# Patient Record
Sex: MALE | Race: WHITE | NOT HISPANIC OR LATINO | Employment: OTHER | URBAN - METROPOLITAN AREA
[De-identification: names, ages, dates, MRNs, and addresses within clinical notes are randomized per-mention and may not be internally consistent; named-entity substitution may affect disease eponyms.]

---

## 2019-05-26 ENCOUNTER — HOSPITAL ENCOUNTER (EMERGENCY)
Facility: HOSPITAL | Age: 34
Discharge: HOME | End: 2019-05-26
Attending: EMERGENCY MEDICINE
Payer: OTHER GOVERNMENT

## 2019-05-26 VITALS
WEIGHT: 145 LBS | SYSTOLIC BLOOD PRESSURE: 131 MMHG | HEIGHT: 69 IN | RESPIRATION RATE: 18 BRPM | TEMPERATURE: 98 F | HEART RATE: 66 BPM | BODY MASS INDEX: 21.48 KG/M2 | DIASTOLIC BLOOD PRESSURE: 82 MMHG | OXYGEN SATURATION: 100 %

## 2019-05-26 DIAGNOSIS — R60.9 DEPENDENT EDEMA: Primary | ICD-10-CM

## 2019-05-26 DIAGNOSIS — R79.89 ELEVATED LIVER FUNCTION TESTS: ICD-10-CM

## 2019-05-26 LAB
ALBUMIN SERPL BCP-MCNC: 3.1 G/DL (ref 3.5–5.2)
ALP SERPL-CCNC: 113 U/L (ref 55–135)
ALT SERPL W/O P-5'-P-CCNC: 132 U/L (ref 10–44)
ANION GAP SERPL CALC-SCNC: 9 MMOL/L (ref 8–16)
APAP SERPL-MCNC: 18 UG/ML (ref 10–20)
AST SERPL-CCNC: 132 U/L (ref 10–40)
BASOPHILS # BLD AUTO: 0.06 K/UL (ref 0–0.2)
BASOPHILS NFR BLD: 1.1 % (ref 0–1.9)
BILIRUB SERPL-MCNC: 0.4 MG/DL (ref 0.1–1)
BILIRUB UR QL STRIP: NEGATIVE
BNP SERPL-MCNC: 60 PG/ML (ref 0–99)
BUN SERPL-MCNC: 5 MG/DL (ref 6–20)
CALCIUM SERPL-MCNC: 8.8 MG/DL (ref 8.7–10.5)
CHLORIDE SERPL-SCNC: 107 MMOL/L (ref 95–110)
CLARITY UR: CLEAR
CO2 SERPL-SCNC: 27 MMOL/L (ref 23–29)
COLOR UR: YELLOW
CREAT SERPL-MCNC: 0.8 MG/DL (ref 0.5–1.4)
DIFFERENTIAL METHOD: ABNORMAL
EOSINOPHIL # BLD AUTO: 0.3 K/UL (ref 0–0.5)
EOSINOPHIL NFR BLD: 5.8 % (ref 0–8)
ERYTHROCYTE [DISTWIDTH] IN BLOOD BY AUTOMATED COUNT: 13.3 % (ref 11.5–14.5)
EST. GFR  (AFRICAN AMERICAN): >60 ML/MIN/1.73 M^2
EST. GFR  (NON AFRICAN AMERICAN): >60 ML/MIN/1.73 M^2
GLUCOSE SERPL-MCNC: 83 MG/DL (ref 70–110)
GLUCOSE UR QL STRIP: NEGATIVE
HCT VFR BLD AUTO: 41.1 % (ref 40–54)
HGB BLD-MCNC: 15 G/DL (ref 14–18)
HGB UR QL STRIP: NEGATIVE
KETONES UR QL STRIP: NEGATIVE
LEUKOCYTE ESTERASE UR QL STRIP: NEGATIVE
LIPASE SERPL-CCNC: 22 U/L (ref 4–60)
LYMPHOCYTES # BLD AUTO: 2 K/UL (ref 1–4.8)
LYMPHOCYTES NFR BLD: 36.3 % (ref 18–48)
MCH RBC QN AUTO: 38.8 PG (ref 27–31)
MCHC RBC AUTO-ENTMCNC: 36.5 G/DL (ref 32–36)
MCV RBC AUTO: 106 FL (ref 82–98)
MONOCYTES # BLD AUTO: 0.6 K/UL (ref 0.3–1)
MONOCYTES NFR BLD: 10.6 % (ref 4–15)
NEUTROPHILS # BLD AUTO: 2.4 K/UL (ref 1.8–7.7)
NEUTROPHILS NFR BLD: 45.1 % (ref 38–73)
NITRITE UR QL STRIP: NEGATIVE
PH UR STRIP: 6 [PH] (ref 5–8)
PLATELET # BLD AUTO: 408 K/UL (ref 150–350)
PMV BLD AUTO: 8.4 FL (ref 9.2–12.9)
POTASSIUM SERPL-SCNC: 3.8 MMOL/L (ref 3.5–5.1)
PROT SERPL-MCNC: 6.5 G/DL (ref 6–8.4)
PROT UR QL STRIP: NEGATIVE
RBC # BLD AUTO: 3.87 M/UL (ref 4.6–6.2)
SODIUM SERPL-SCNC: 143 MMOL/L (ref 136–145)
SP GR UR STRIP: 1.01 (ref 1–1.03)
URN SPEC COLLECT METH UR: NORMAL
UROBILINOGEN UR STRIP-ACNC: NEGATIVE EU/DL
WBC # BLD AUTO: 5.37 K/UL (ref 3.9–12.7)

## 2019-05-26 PROCEDURE — 83880 ASSAY OF NATRIURETIC PEPTIDE: CPT

## 2019-05-26 PROCEDURE — 83690 ASSAY OF LIPASE: CPT

## 2019-05-26 PROCEDURE — 81003 URINALYSIS AUTO W/O SCOPE: CPT

## 2019-05-26 PROCEDURE — 80329 ANALGESICS NON-OPIOID 1 OR 2: CPT

## 2019-05-26 PROCEDURE — 99283 EMERGENCY DEPT VISIT LOW MDM: CPT

## 2019-05-26 PROCEDURE — 85025 COMPLETE CBC W/AUTO DIFF WBC: CPT

## 2019-05-26 PROCEDURE — 80053 COMPREHEN METABOLIC PANEL: CPT

## 2019-05-26 PROCEDURE — 25000003 PHARM REV CODE 250: Performed by: NURSE PRACTITIONER

## 2019-05-26 RX ORDER — FUROSEMIDE 40 MG/1
40 TABLET ORAL DAILY
Qty: 2 TABLET | Refills: 0 | Status: ON HOLD | OUTPATIENT
Start: 2019-05-26 | End: 2023-08-23 | Stop reason: HOSPADM

## 2019-05-26 RX ORDER — FUROSEMIDE 40 MG/1
40 TABLET ORAL
Status: COMPLETED | OUTPATIENT
Start: 2019-05-26 | End: 2019-05-26

## 2019-05-26 RX ADMIN — FUROSEMIDE 40 MG: 40 TABLET ORAL at 09:05

## 2019-05-26 NOTE — ED PROVIDER NOTES
"Encounter Date: 5/26/2019    SCRIBE #1 NOTE: I, Wagneron Reva, am scribing for, and in the presence of,  DAVID Valenzuela. I have scribed the following portions of the note - Other sections scribed: HPI, ROS.       History     Chief Complaint   Patient presents with    Leg Swelling     Pt reports flying back from [place] last Saturday and has swelling to both legs. Pt went to  and no blood clots were found, pt denies receiving medication to reduce swelling. Pt denies shortness of breath.      This is a 33 y.o. male who presents withbilateral lower extremity swelling beginning one week ago. The patient states that flew in one week ago from [place] and began to notice swelling.The patient was seen at [facility] 2 days ago and diagnosed with liver dysfunction and edema. He received an US, which found no blood clots.  He has elevated both feet, but the swelling has persisted, and reportedly worsened over the past two days.  He has taken Tylenol, usually twice daily. He reports Naproxen and Ibuprofen allergy. The patient has not had any alcohol to drink today, but reports drinking 2 beers yesterday. He denies calf pain, dysuria, cough, fever, chills. nausea, vomiting, chest pain, rhinorrhea, and shortness of breath. He reports his last dose of Tylenol was this morning prior to arrival.    The history is provided by the patient. No  was used.     Review of patient's allergies indicates:   Allergen Reactions    Nsaids (non-steroidal anti-inflammatory drug) Swelling     " my face swells"     History reviewed. No pertinent past medical history.  History reviewed. No pertinent surgical history.  History reviewed. No pertinent family history.  Social History     Tobacco Use    Smoking status: Current Every Day Smoker    Smokeless tobacco: Never Used   Substance Use Topics    Alcohol use: Yes     Comment: occasional    Drug use: Never     Review of Systems   Constitutional: Negative for chills " and fever.   HENT: Negative for rhinorrhea, sore throat and trouble swallowing.    Respiratory: Negative for cough and shortness of breath.    Cardiovascular: Negative for chest pain.   Gastrointestinal: Negative for nausea and vomiting.   Genitourinary: Negative for dysuria.   Musculoskeletal: Negative for back pain and neck pain.        (-) calf pain  (+) bilateral lower extremity swelling (Ankle to foot)  (-) calf swelling   Skin: Negative for pallor, rash and wound.   Neurological: Negative for syncope and weakness.   Psychiatric/Behavioral: Negative for confusion.       Physical Exam     Initial Vitals [05/26/19 0738]   BP Pulse Resp Temp SpO2   123/83 82 16 97.8 °F (36.6 °C) 99 %      MAP       --         Physical Exam    Nursing note and vitals reviewed.  Constitutional: He appears well-developed and well-nourished. He is not diaphoretic. He is cooperative.  Non-toxic appearance. He does not have a sickly appearance. He does not appear ill. No distress.   HENT:   Head: Normocephalic and atraumatic.   Right Ear: Tympanic membrane and external ear normal. Tympanic membrane is not erythematous.   Left Ear: Tympanic membrane and external ear normal. Tympanic membrane is not erythematous.   Mouth/Throat: Uvula is midline, oropharynx is clear and moist and mucous membranes are normal. No trismus in the jaw.   Eyes: Conjunctivae and EOM are normal.   Neck: Normal range of motion.   Cardiovascular: Normal rate, regular rhythm and intact distal pulses.   No murmur heard.  Pulses:       Radial pulses are 2+ on the right side, and 2+ on the left side.        Dorsalis pedis pulses are 2+ on the right side, and 2+ on the left side.   1-2+ pitting edema to bilateral lower extremities from the lower ankle distally.    Pulmonary/Chest: Breath sounds normal. No tachypnea and no bradypnea. No respiratory distress. He has no wheezes. He has no rhonchi. He has no rales.   Abdominal: Soft. He exhibits no distension. There is no  tenderness. There is no rebound and no guarding.   Musculoskeletal: Normal range of motion.        Right knee: Normal.        Left knee: Normal.        Right ankle: He exhibits swelling. He exhibits normal range of motion. No tenderness. No lateral malleolus and no medial malleolus tenderness found.        Left ankle: He exhibits swelling. He exhibits normal range of motion. No tenderness. No lateral malleolus and no medial malleolus tenderness found.        Right foot: There is swelling. There is no tenderness, no bony tenderness, normal capillary refill, no crepitus and no deformity.        Left foot: There is swelling. There is no tenderness, no bony tenderness, normal capillary refill, no crepitus and no deformity.        Feet:    No tenderness over the bilateral foot or ankle.  There is 1 to 2+ pitting edema over the distal ankle to the toe.  There is no popliteal tenderness or fullness.  No posterior calf tenderness or fullness.  There is no tib-fib swelling. There is no pretibial edema.   Neurological: He is alert and oriented to person, place, and time. He has normal strength. No sensory deficit. Coordination and gait normal. GCS score is 15. GCS eye subscore is 4. GCS verbal subscore is 5. GCS motor subscore is 6.   Skin: Skin is warm and dry. Capillary refill takes less than 2 seconds. No bruising and no rash noted. No erythema.   Psychiatric: He has a normal mood and affect. His behavior is normal. Judgment and thought content normal.         ED Course   Procedures  Labs Reviewed   COMPREHENSIVE METABOLIC PANEL - Abnormal; Notable for the following components:       Result Value    BUN, Bld 5 (*)     Albumin 3.1 (*)      (*)      (*)     All other components within normal limits   CBC W/ AUTO DIFFERENTIAL - Abnormal; Notable for the following components:    RBC 3.87 (*)     Mean Corpuscular Volume 106 (*)     Mean Corpuscular Hemoglobin 38.8 (*)     Mean Corpuscular Hemoglobin Conc 36.5 (*)      Platelets 408 (*)     MPV 8.4 (*)     All other components within normal limits   LIPASE   ACETAMINOPHEN LEVEL   URINALYSIS, REFLEX TO URINE CULTURE    Narrative:     Preferred Collection Type->Urine, Clean Catch   B-TYPE NATRIURETIC PEPTIDE          Imaging Results    None          Medical Decision Making:   History:   Old Medical Records: I decided to obtain old medical records.  Old Records Summarized: records from another hospital.       <> Summary of Records: Records reviewed:  Patient evaluated at [facility] on 05/24/2019.  Diagnosed with liver dysfunction, swelling and dependent edema.  Normal chest x-ray.  Labs with an elevated AST:  212, ALT:  165, alk phos 125.  Normal T bili.  Normal renal function. Bilateral lower extremity ultrasounds without evidence of DVT.       APC / Resident Notes:   This is an evaluation of a 33 y.o. male that presents to the Emergency Department for bilateral foot and ankle swelling. Reports no recent injuries.  Does report flight from [place].  Evaluated at an outside facility with negative ultrasounds for DVT 2 days ago.  Also noted to have elevated liver function. Occasional Tylenol use, occasional alcohol. Physical Exam shows a non-toxic, afebrile, and well appearing male.  There is 1 to 2+ pitting edema over the distal ankle extending to the foot bilaterally. Distal pulses intact. Capillary refill less than 2 sec.  No bony tenderness over the bilateral feet or ankles.  Posterior calf compartments are soft without pain. No popliteal tenderness or fullness.  No distal tib-fib were pretibial swelling or edema. Breath sounds are clear and equal. Heart regular rhythm with no murmurs appreciated. Vital signs are reassuring. If available, previous records reviewed. RESULTS:  CBC with no leukocytosis.  Normal H&H.  Elevation in the MCV: 106, MCH:  30.8, platelet count 408.  Normal granulocytes.  CMP with a BUN of 5, AST elevated at 132, ALT elevated at 132.  Normal T bili.  Normal  alk-phos.  Acetaminophen level within normal limits at 18 however above typical lower limits. Urinalysis without evidence of infection or proteinuria.    My overall impression is elevated liver enzymes (?tylenol vs alcohol vs other) with dependent edema. I considered, but at this time, do not suspect DVT, cellulitis, arterial occlusion, compartment syndrome, heart failure, shock liver.  He has been advised that he is to follow up with primary care for additional testing including but not limited to hepatitis.    ED Course:  Lasix. D/C Meds:  Lasix x2 additional days. D/C Information:  Elevation of legs, avoidance of Tylenol and alcohol. The diagnosis, treatment plan, instructions for follow-up and reevaluation with PCP upon return to [place] in 2 days  as well as ED return precautions were discussed and understanding was verbalized. All questions or concerns have been addressed. This case was discussed with Dr. Rebolledo who is in agreement with my assessment and plan. MACK Watson FNP-C          Scribe attestation: I, MACK Watson FNP-C, personally performed the services described in this documentation. All medical record entries made by the scribe were at my direction and in my presence.  I have reviewed the chart and agree that the record reflects my personal performance and is accurate and complete.             Clinical Impression:     1. Dependent edema    2. Elevated liver function tests            Disposition:   Disposition: Discharged  Condition: Stable                        DAVID Busch  05/26/19 0917

## 2019-05-26 NOTE — ED TRIAGE NOTES
Pt arrived to ER with complaints of bilateral lower extremity edema. Pt rates the pain as 6/10 at this time. Pt flew in from [place] last Saturday and was seen at [facility] on Friday. No blood clots found by [facility]. Pt denies shortness of breath, pt denies receiving any medication to reduce the swelling. Pt was advised to elevate his legs, pt denies relief from swelling with elevating.

## 2023-08-14 ENCOUNTER — HOSPITAL ENCOUNTER (INPATIENT)
Facility: HOSPITAL | Age: 38
LOS: 14 days | Discharge: NURSING, REHAB OR LONG-TERM CARE | DRG: 896 | End: 2023-08-28
Attending: EMERGENCY MEDICINE | Admitting: INTERNAL MEDICINE
Payer: OTHER GOVERNMENT

## 2023-08-14 DIAGNOSIS — E87.8 ELECTROLYTE ABNORMALITY: ICD-10-CM

## 2023-08-14 DIAGNOSIS — M79.89 LEG SWELLING: ICD-10-CM

## 2023-08-14 DIAGNOSIS — F10.931 ALCOHOL WITHDRAWAL SYNDROME, WITH DELIRIUM: Primary | ICD-10-CM

## 2023-08-14 DIAGNOSIS — E80.6 HYPERBILIRUBINEMIA: ICD-10-CM

## 2023-08-14 DIAGNOSIS — R41.82 ALTERED MENTAL STATUS: ICD-10-CM

## 2023-08-14 DIAGNOSIS — E87.6 HYPOKALEMIA: ICD-10-CM

## 2023-08-14 DIAGNOSIS — R60.0 LOWER EXTREMITY EDEMA: ICD-10-CM

## 2023-08-14 DIAGNOSIS — M79.89 BILATERAL SWELLING OF FEET: ICD-10-CM

## 2023-08-14 DIAGNOSIS — E83.52 HYPERCALCEMIA: ICD-10-CM

## 2023-08-14 DIAGNOSIS — R00.0 SINUS TACHYCARDIA: ICD-10-CM

## 2023-08-14 PROBLEM — G93.41 ENCEPHALOPATHY, METABOLIC: Status: ACTIVE | Noted: 2023-08-14

## 2023-08-14 PROBLEM — F10.10 ALCOHOL ABUSE: Status: ACTIVE | Noted: 2023-08-14

## 2023-08-14 PROBLEM — R74.01 TRANSAMINITIS: Status: ACTIVE | Noted: 2023-08-14

## 2023-08-14 PROBLEM — E87.20 LACTIC ACID ACIDOSIS: Status: ACTIVE | Noted: 2023-08-14

## 2023-08-14 LAB
ALBUMIN SERPL BCP-MCNC: 3.2 G/DL (ref 3.5–5.2)
ALP SERPL-CCNC: 124 U/L (ref 55–135)
ALT SERPL W/O P-5'-P-CCNC: 54 U/L (ref 10–44)
AMMONIA PLAS-SCNC: 36 UMOL/L (ref 10–50)
AMPHET+METHAMPHET UR QL: NEGATIVE
ANION GAP SERPL CALC-SCNC: 24 MMOL/L (ref 8–16)
AST SERPL-CCNC: 63 U/L (ref 10–40)
BACTERIA #/AREA URNS HPF: ABNORMAL /HPF
BARBITURATES UR QL SCN>200 NG/ML: NEGATIVE
BASOPHILS # BLD AUTO: 0.01 K/UL (ref 0–0.2)
BASOPHILS NFR BLD: 0.1 % (ref 0–1.9)
BENZODIAZ UR QL SCN>200 NG/ML: NEGATIVE
BILIRUB SERPL-MCNC: 3 MG/DL (ref 0.1–1)
BILIRUB UR QL STRIP: NEGATIVE
BNP SERPL-MCNC: 70 PG/ML (ref 0–99)
BUN SERPL-MCNC: 14 MG/DL (ref 6–20)
BZE UR QL SCN: NEGATIVE
CALCIUM SERPL-MCNC: 11.6 MG/DL (ref 8.7–10.5)
CANNABINOIDS UR QL SCN: NEGATIVE
CAOX CRY URNS QL MICRO: ABNORMAL
CHLORIDE SERPL-SCNC: 91 MMOL/L (ref 95–110)
CLARITY UR: ABNORMAL
CO2 SERPL-SCNC: 17 MMOL/L (ref 23–29)
COLOR UR: ABNORMAL
CREAT SERPL-MCNC: 0.7 MG/DL (ref 0.5–1.4)
CREAT UR-MCNC: 173.9 MG/DL (ref 23–375)
CREAT UR-MCNC: 174.9 MG/DL (ref 23–375)
DIFFERENTIAL METHOD: ABNORMAL
EOSINOPHIL # BLD AUTO: 0 K/UL (ref 0–0.5)
EOSINOPHIL NFR BLD: 0 % (ref 0–8)
ERYTHROCYTE [DISTWIDTH] IN BLOOD BY AUTOMATED COUNT: 16 % (ref 11.5–14.5)
EST. GFR  (NO RACE VARIABLE): >60 ML/MIN/1.73 M^2
ETHANOL SERPL-MCNC: <10 MG/DL
FOLATE SERPL-MCNC: <2.2 NG/ML (ref 4–24)
GLUCOSE SERPL-MCNC: 95 MG/DL (ref 70–110)
GLUCOSE UR QL STRIP: NEGATIVE
HCT VFR BLD AUTO: 27.5 % (ref 40–54)
HGB BLD-MCNC: 10.1 G/DL (ref 14–18)
HGB UR QL STRIP: NEGATIVE
HYALINE CASTS #/AREA URNS LPF: 9 /LPF
IMM GRANULOCYTES # BLD AUTO: 0.08 K/UL (ref 0–0.04)
IMM GRANULOCYTES NFR BLD AUTO: 0.7 % (ref 0–0.5)
INR PPP: 1.1 (ref 0.8–1.2)
KETONES UR QL STRIP: ABNORMAL
LACTATE SERPL-SCNC: 10.3 MMOL/L (ref 0.5–2.2)
LACTATE SERPL-SCNC: 4 MMOL/L (ref 0.5–2.2)
LEUKOCYTE ESTERASE UR QL STRIP: NEGATIVE
LYMPHOCYTES # BLD AUTO: 0.7 K/UL (ref 1–4.8)
LYMPHOCYTES NFR BLD: 5.6 % (ref 18–48)
MAGNESIUM SERPL-MCNC: 1.7 MG/DL (ref 1.6–2.6)
MCH RBC QN AUTO: 44.1 PG (ref 27–31)
MCHC RBC AUTO-ENTMCNC: 36.7 G/DL (ref 32–36)
MCV RBC AUTO: 120 FL (ref 82–98)
METHADONE UR QL SCN>300 NG/ML: NEGATIVE
MICROSCOPIC COMMENT: ABNORMAL
MONOCYTES # BLD AUTO: 0.6 K/UL (ref 0.3–1)
MONOCYTES NFR BLD: 4.9 % (ref 4–15)
NEUTROPHILS # BLD AUTO: 10.7 K/UL (ref 1.8–7.7)
NEUTROPHILS NFR BLD: 88.7 % (ref 38–73)
NITRITE UR QL STRIP: NEGATIVE
NRBC BLD-RTO: 0 /100 WBC
OPIATES UR QL SCN: NEGATIVE
PCP UR QL SCN>25 NG/ML: NEGATIVE
PH UR STRIP: 6 [PH] (ref 5–8)
PHOSPHATE SERPL-MCNC: 4.8 MG/DL (ref 2.7–4.5)
PLATELET # BLD AUTO: 157 K/UL (ref 150–450)
PMV BLD AUTO: 9.6 FL (ref 9.2–12.9)
POTASSIUM SERPL-SCNC: 2.9 MMOL/L (ref 3.5–5.1)
PROCALCITONIN SERPL IA-MCNC: 0.13 NG/ML
PROT SERPL-MCNC: 6.6 G/DL (ref 6–8.4)
PROT UR QL STRIP: ABNORMAL
PROT UR-MCNC: 28 MG/DL
PROT/CREAT UR: 0.16 MG/G{CREAT} (ref 0–0.2)
PROTHROMBIN TIME: 11.9 SEC (ref 9–12.5)
RBC # BLD AUTO: 2.29 M/UL (ref 4.6–6.2)
RBC #/AREA URNS HPF: 0 /HPF (ref 0–4)
SODIUM SERPL-SCNC: 132 MMOL/L (ref 136–145)
SP GR UR STRIP: 1.03 (ref 1–1.03)
TOXICOLOGY INFORMATION: NORMAL
TROPONIN I SERPL DL<=0.01 NG/ML-MCNC: <0.006 NG/ML (ref 0–0.03)
TSH SERPL DL<=0.005 MIU/L-ACNC: 1.29 UIU/ML (ref 0.4–4)
UNIDENT CRYS URNS QL MICRO: ABNORMAL
URN SPEC COLLECT METH UR: ABNORMAL
UROBILINOGEN UR STRIP-ACNC: ABNORMAL EU/DL
VIT B12 SERPL-MCNC: 423 PG/ML (ref 210–950)
WBC # BLD AUTO: 12.06 K/UL (ref 3.9–12.7)
WBC #/AREA URNS HPF: 0 /HPF (ref 0–5)
WBC CLUMPS URNS QL MICRO: ABNORMAL

## 2023-08-14 PROCEDURE — 25000003 PHARM REV CODE 250: Performed by: HOSPITALIST

## 2023-08-14 PROCEDURE — 93010 EKG 12-LEAD: ICD-10-PCS | Mod: ,,, | Performed by: INTERNAL MEDICINE

## 2023-08-14 PROCEDURE — 84425 ASSAY OF VITAMIN B-1: CPT | Performed by: HOSPITALIST

## 2023-08-14 PROCEDURE — 96365 THER/PROPH/DIAG IV INF INIT: CPT | Mod: 59

## 2023-08-14 PROCEDURE — 80053 COMPREHEN METABOLIC PANEL: CPT | Performed by: EMERGENCY MEDICINE

## 2023-08-14 PROCEDURE — 82140 ASSAY OF AMMONIA: CPT | Performed by: EMERGENCY MEDICINE

## 2023-08-14 PROCEDURE — 11000001 HC ACUTE MED/SURG PRIVATE ROOM

## 2023-08-14 PROCEDURE — 85610 PROTHROMBIN TIME: CPT | Performed by: EMERGENCY MEDICINE

## 2023-08-14 PROCEDURE — 84100 ASSAY OF PHOSPHORUS: CPT | Performed by: EMERGENCY MEDICINE

## 2023-08-14 PROCEDURE — 84145 PROCALCITONIN (PCT): CPT | Performed by: EMERGENCY MEDICINE

## 2023-08-14 PROCEDURE — 80307 DRUG TEST PRSMV CHEM ANLYZR: CPT | Performed by: EMERGENCY MEDICINE

## 2023-08-14 PROCEDURE — 82077 ASSAY SPEC XCP UR&BREATH IA: CPT | Performed by: EMERGENCY MEDICINE

## 2023-08-14 PROCEDURE — 93005 ELECTROCARDIOGRAM TRACING: CPT

## 2023-08-14 PROCEDURE — 81000 URINALYSIS NONAUTO W/SCOPE: CPT | Mod: 59 | Performed by: EMERGENCY MEDICINE

## 2023-08-14 PROCEDURE — 84443 ASSAY THYROID STIM HORMONE: CPT | Performed by: EMERGENCY MEDICINE

## 2023-08-14 PROCEDURE — 83735 ASSAY OF MAGNESIUM: CPT | Performed by: EMERGENCY MEDICINE

## 2023-08-14 PROCEDURE — 80074 ACUTE HEPATITIS PANEL: CPT | Performed by: HOSPITALIST

## 2023-08-14 PROCEDURE — 87040 BLOOD CULTURE FOR BACTERIA: CPT | Performed by: EMERGENCY MEDICINE

## 2023-08-14 PROCEDURE — 99285 EMERGENCY DEPT VISIT HI MDM: CPT | Mod: 25

## 2023-08-14 PROCEDURE — 25000003 PHARM REV CODE 250: Performed by: EMERGENCY MEDICINE

## 2023-08-14 PROCEDURE — 63600175 PHARM REV CODE 636 W HCPCS: Performed by: EMERGENCY MEDICINE

## 2023-08-14 PROCEDURE — 84484 ASSAY OF TROPONIN QUANT: CPT | Performed by: EMERGENCY MEDICINE

## 2023-08-14 PROCEDURE — 93010 ELECTROCARDIOGRAM REPORT: CPT | Mod: ,,, | Performed by: INTERNAL MEDICINE

## 2023-08-14 PROCEDURE — 83605 ASSAY OF LACTIC ACID: CPT | Mod: 91 | Performed by: EMERGENCY MEDICINE

## 2023-08-14 PROCEDURE — 82607 VITAMIN B-12: CPT | Performed by: EMERGENCY MEDICINE

## 2023-08-14 PROCEDURE — 85025 COMPLETE CBC W/AUTO DIFF WBC: CPT | Performed by: EMERGENCY MEDICINE

## 2023-08-14 PROCEDURE — 84156 ASSAY OF PROTEIN URINE: CPT | Performed by: HOSPITALIST

## 2023-08-14 PROCEDURE — 86592 SYPHILIS TEST NON-TREP QUAL: CPT | Performed by: HOSPITALIST

## 2023-08-14 PROCEDURE — 83880 ASSAY OF NATRIURETIC PEPTIDE: CPT | Performed by: EMERGENCY MEDICINE

## 2023-08-14 PROCEDURE — 82746 ASSAY OF FOLIC ACID SERUM: CPT | Performed by: EMERGENCY MEDICINE

## 2023-08-14 PROCEDURE — 63600175 PHARM REV CODE 636 W HCPCS: Performed by: HOSPITALIST

## 2023-08-14 RX ORDER — MAGNESIUM SULFATE 1 G/100ML
1 INJECTION INTRAVENOUS ONCE
Status: COMPLETED | OUTPATIENT
Start: 2023-08-14 | End: 2023-08-14

## 2023-08-14 RX ORDER — DIAZEPAM 5 MG/1
5 TABLET ORAL EVERY 8 HOURS
Status: DISCONTINUED | OUTPATIENT
Start: 2023-08-14 | End: 2023-08-16

## 2023-08-14 RX ORDER — FOLIC ACID 1 MG/1
1 TABLET ORAL DAILY
Status: DISCONTINUED | OUTPATIENT
Start: 2023-08-14 | End: 2023-08-28 | Stop reason: HOSPADM

## 2023-08-14 RX ORDER — DEXTROSE MONOHYDRATE 50 MG/ML
INJECTION, SOLUTION INTRAVENOUS CONTINUOUS
Status: DISCONTINUED | OUTPATIENT
Start: 2023-08-14 | End: 2023-08-15

## 2023-08-14 RX ORDER — POTASSIUM CHLORIDE 20 MEQ/1
60 TABLET, EXTENDED RELEASE ORAL ONCE
Status: COMPLETED | OUTPATIENT
Start: 2023-08-14 | End: 2023-08-14

## 2023-08-14 RX ORDER — LORAZEPAM 2 MG/ML
2 INJECTION INTRAMUSCULAR
Status: DISCONTINUED | OUTPATIENT
Start: 2023-08-14 | End: 2023-08-16

## 2023-08-14 RX ORDER — HEPARIN SODIUM 5000 [USP'U]/ML
5000 INJECTION, SOLUTION INTRAVENOUS; SUBCUTANEOUS EVERY 8 HOURS
Status: DISCONTINUED | OUTPATIENT
Start: 2023-08-14 | End: 2023-08-28 | Stop reason: HOSPADM

## 2023-08-14 RX ADMIN — POTASSIUM CHLORIDE 60 MEQ: 1500 TABLET, EXTENDED RELEASE ORAL at 04:08

## 2023-08-14 RX ADMIN — DIAZEPAM 5 MG: 5 TABLET ORAL at 09:08

## 2023-08-14 RX ADMIN — MAGNESIUM SULFATE 1 G: 1 INJECTION INTRAVENOUS at 04:08

## 2023-08-14 RX ADMIN — THERA TABS 1 TABLET: TAB at 04:08

## 2023-08-14 RX ADMIN — PIPERACILLIN AND TAZOBACTAM 4.5 G: 4; .5 INJECTION, POWDER, LYOPHILIZED, FOR SOLUTION INTRAVENOUS; PARENTERAL at 11:08

## 2023-08-14 RX ADMIN — THIAMINE HYDROCHLORIDE 500 MG: 100 INJECTION, SOLUTION INTRAMUSCULAR; INTRAVENOUS at 05:08

## 2023-08-14 RX ADMIN — THIAMINE HYDROCHLORIDE 100 MG: 100 INJECTION, SOLUTION INTRAMUSCULAR; INTRAVENOUS at 11:08

## 2023-08-14 RX ADMIN — FOLIC ACID 1 MG: 1 TABLET ORAL at 04:08

## 2023-08-14 RX ADMIN — DEXTROSE MONOHYDRATE: 50 INJECTION, SOLUTION INTRAVENOUS at 04:08

## 2023-08-14 RX ADMIN — THIAMINE HYDROCHLORIDE 500 MG: 100 INJECTION, SOLUTION INTRAMUSCULAR; INTRAVENOUS at 08:08

## 2023-08-14 RX ADMIN — HEPARIN SODIUM 5000 UNITS: 5000 INJECTION INTRAVENOUS; SUBCUTANEOUS at 09:08

## 2023-08-14 NOTE — PHARMACY MED REC
"Admission Medication History     The home medication history was taken by Heather De Leon CPhT.      You may go to "Admission" then "Reconcile Home Medications" tabs to review and/or act upon these items.     The home medication list has been updated by the Pharmacy department.   Please read ALL comments highlighted in yellow.   Please address this information as you see fit.    Feel free to contact us if you have any questions or require assistance.            Patient unable to assess at this time.      No current dispense histories.    Heather De Leon CPhT.  [phone]                  .        "

## 2023-08-14 NOTE — HPI
This is a 37 year old male with PMHx of alcohol abuse,who presents to the ED via EMS for chief complaint of Altered Mental Status onset this morning. Patient reports travelling for work and that he has been under stress and his symptoms began in [facility]. Additional history obtained by an independent historian: EMS personnel, notes that when EMS arrived patient was sitting on a lawn chair on the porch incoherent; patient's roommate stated that the patient is a recovering alcoholic and had 1 alcoholic beverage this morning as well as usually being coherent and able to ambulate. Patient was also noted to repeat himself and when asked when his symptoms started he stated they began in July. EMS further noted unsteady gait, and that the patient complained of bilateral forearm and lower extremity pain with edema.  Patient further denies cough, shortness of breath, chest pain, fever, chills, abdominal pain, nausea, vomiting, diarrhea, dysuria, headaches, congestion, sore throat, eye pain, ear pain, rash, or other associated symptoms. Patient denies recreational drug use;patient has elevated lactic acid 10.3,improved with bolus of IVF,he has hypokalemia, 2.9,replaced,CT head show no acute process,patient has lege edema,US legs show no DVT.he has proteinuria on UA.patient is admitted for inpatient status.

## 2023-08-14 NOTE — H&P
[facility] Emergency [facility] Medicine  History & Physical    Patient Name: Sony Smith  MRN: [id number]  Patient Class: IP- Inpatient  Admission Date: 8/14/2023  Attending Physician: Anil Wilder MD   Primary Care Provider: Meghana, Primary Doctor         Patient information was obtained from patient and ER records.     Subjective:     Principal Problem:Encephalopathy, metabolic    Chief Complaint:   Chief Complaint   Patient presents with    Altered Mental Status     Pt BIB EMS from home for altered mental status. EMS report Pt GCS 15, alert to person. EMS reported Pt repeats himself, does not where he is and timeline is off. EMS reported unsteady gait, and c/o bilateral forearm, and lower extremity pain with edema.        HPI: This is a 37 year old male with PMHx of alcohol abuse,who presents to the ED via EMS for chief complaint of Altered Mental Status onset this morning. Patient reports travelling for work and that he has been under stress and his symptoms began in [facility]. Additional history obtained by an independent historian: EMS personnel, notes that when EMS arrived [facility]; patient's roommate stated that the patient is a recovering alcoholic and had 1 alcoholic beverage this morning as well as usually being coherent and able to ambulate. Patient was also noted to repeat himself and when asked when his symptoms started he stated they began in July. EMS further noted unsteady gait, and that the patient complained of bilateral forearm and lower extremity pain with edema.  Patient further denies cough, shortness of breath, chest pain, fever, chills, abdominal pain, nausea, vomiting, diarrhea, dysuria, headaches, congestion, sore throat, eye pain, ear pain, rash, or other associated symptoms. Patient denies recreational drug use;patient has elevated lactic acid 10.3,improved with bolus of IVF,he has hypokalemia, 2.9,replaced,CT head show no  "acute process,patient has lege edema,US legs show no DVT.he has proteinuria on UA.patient is admitted for inpatient status.      History reviewed. No pertinent past medical history.    History reviewed. No pertinent surgical history.    Review of patient's allergies indicates:   Allergen Reactions    Nsaids (non-steroidal anti-inflammatory drug) Swelling     " my face swells"    Cyclobenzaprine        No current facility-administered medications on file prior to encounter.     Current Outpatient Medications on File Prior to Encounter   Medication Sig    furosemide (LASIX) 40 MG tablet Take 1 tablet (40 mg total) by mouth once daily. for 2 days     Family History    None       Tobacco Use    Smoking status: Every Day     Current packs/day: 0.00    Smokeless tobacco: Never   Substance and Sexual Activity    Alcohol use: Yes     Comment: occasional- 8/14/23: states recovered alcoholic for 2 years    Drug use: Never    Sexual activity: Not on file     Review of Systems   Constitutional:  Positive for activity change and appetite change.   HENT:  Negative for congestion and dental problem.    Eyes:  Negative for discharge and itching.   Respiratory:  Negative for apnea and chest tightness.    Gastrointestinal:  Negative for abdominal distention.   Endocrine: Negative for cold intolerance.   Genitourinary:  Negative for difficulty urinating and dysuria.   Musculoskeletal:  Negative for arthralgias and back pain.   Skin:  Negative for color change and pallor.   Allergic/Immunologic: Negative for environmental allergies.   Neurological:  Positive for weakness. Negative for dizziness and facial asymmetry.   Psychiatric/Behavioral:  Positive for confusion.      Objective:     Vital Signs (Most Recent):  Temp: 97.9 °F (36.6 °C) (08/14/23 1402)  Pulse: 108 (08/14/23 1454)  Resp: 14 (08/14/23 1454)  BP: (!) 100/56 (08/14/23 1432)  SpO2: 100 % (08/14/23 1454) Vital Signs (24h Range):  Temp:  [97.9 °F (36.6 °C)-98.1 °F " (36.7 °C)] 97.9 °F (36.6 °C)  Pulse:  [108-130] 108  Resp:  [13-28] 14  SpO2:  [86 %-100 %] 100 %  BP: (100-142)/(52-85) 100/56     Weight: 74.8 kg (165 lb)  Body mass index is 23.68 kg/m².     Physical Exam  HENT:      Head: Atraumatic.      Nose: Nose normal. No congestion.      Mouth/Throat:      Mouth: Mucous membranes are dry.      Pharynx: No oropharyngeal exudate.   Eyes:      Extraocular Movements: Extraocular movements intact.      Pupils: Pupils are equal, round, and reactive to light.   Cardiovascular:      Rate and Rhythm: Normal rate.      Heart sounds: No murmur heard.  Pulmonary:      Effort: No respiratory distress.      Breath sounds: No wheezing.   Abdominal:      General: There is no distension.   Musculoskeletal:         General: Swelling present. Normal range of motion.      Cervical back: Normal range of motion.   Skin:     Coloration: Skin is not jaundiced.      Findings: No bruising.   Neurological:      General: No focal deficit present.      Mental Status: He is alert.      Cranial Nerves: No cranial nerve deficit.      Motor: No weakness.   Psychiatric:      Comments: Confused               CRANIAL NERVES     CN III, IV, VI   Pupils are equal, round, and reactive to light.       Significant Labs: All pertinent labs within the past 24 hours have been reviewed.  BMP:   Recent Labs   Lab 08/14/23  0931   GLU 95   *   K 2.9*   CL 91*   CO2 17*   BUN 14   CREATININE 0.7   CALCIUM 11.6*   MG 1.7     CBC:   Recent Labs   Lab 08/14/23  0931   WBC 12.06   HGB 10.1*   HCT 27.5*        CMP:   Recent Labs   Lab 08/14/23  0931   *   K 2.9*   CL 91*   CO2 17*   GLU 95   BUN 14   CREATININE 0.7   CALCIUM 11.6*   PROT 6.6   ALBUMIN 3.2*   BILITOT 3.0*   ALKPHOS 124   AST 63*   ALT 54*   ANIONGAP 24*       Significant Imaging: I have reviewed all pertinent imaging results/findings within the past 24 hours.    Assessment/Plan:     * Encephalopathy, metabolic  Head CT show no acute  process,has normal ammonia and TSH,will check  RPR,B12,B1,folid acid,started on high dose IV Thiamin,replaced potassium ad magnesium,will monitor.    Transaminitis  US liver is unremarkable,liley duo to alcohol abuse,chech hepatitis panel,will monitor.      Alcohol abuse  Started on DT precautions with scheduled valium and prn IV Ativan.      Swelling of lower leg  Check Protein,creatin  Ratio,has normal BNP ad no sign of fluid overload on chest X ray.leg US is negative,has borderline low albumin,stared on supplement,will monitor.      Hypokalemia  Replaced.      Lactic acid acidosis  Improving,follow up with blood culture,  On IVF.      VTE Risk Mitigation (From admission, onward)         Ordered     heparin (porcine) injection 5,000 Units  Every 8 hours         08/14/23 9344                           Zoya Lucero MD  Department of Hospital Medicine  [facility] [place] - Emergency Dept

## 2023-08-14 NOTE — PLAN OF CARE
SW attempted discharge planning assessment.  Patient seemed confused and was not able to provide information needed for assessment.

## 2023-08-14 NOTE — ASSESSMENT & PLAN NOTE
Head CT show no acute process,has normal ammonia and TSH,will check  RPR,B12,B1,folid acid,started on high dose IV Thiamin,replaced potassium ad magnesium,will monitor.

## 2023-08-14 NOTE — NURSING
Patient arrived to floor via wheelchair via transporter from ED.  Patient transferred to bed via x1 person assist. AAOX3.  Patient was oriented to room, information on whiteboard, and medication regimen.  Bed low, adequate lighting provided, side rails x2 up, call bell within reach. VSS.  Patient denied having any acute distress at this time.  None observed.  Will continue to monitor and follow treatment plan. [facility]  Nurses Note -- 4 Eyes      8/14/2023       Skin assessed on: Admit      [x] No Pressure Injuries Present    [x]Prevention Measures Documented    [] Yes LDA  for Pressure Injury Previously documented     [] Yes New Pressure Injury Discovered   [] LDA for New Pressure Injury Added      Attending RN:  Michelle Lou RN     Second RN:  MESERET Díaz

## 2023-08-14 NOTE — ED PROVIDER NOTES
Encounter Date: 8/14/2023    SCRIBE #1 NOTE: I, Christel Guerrero, am scribing for, and in the presence of,  Marc Alexandra MD. I have scribed the following portions of the note - Other sections scribed: HPI, ROS.       History     Chief Complaint   Patient presents with    Altered Mental Status     Pt BIB EMS from home for altered mental status. EMS report Pt GCS 15, alert to person. EMS reported Pt repeats himself, does not where he is and timeline is off. EMS reported unsteady gait, and c/o bilateral forearm, and lower extremity pain with edema.     This is a 37 year old male with no pertinent PMHx who presents to the ED via EMS for chief complaint of Altered Mental Status onset this morning. Patient reports travelling for work and that he has been under stress and his symptoms began in [facility]. Additional history obtained by an independent historian: EMS personnel, notes that when EMS arrived patient was sitting on a lawn chair on the por incoherent; patient's roommate stated that the patient is a recovering alcoholic and had 1 alcoholic beverage this morning as well as usually being coherent and able to ambulate. Patient was also noted to repeat himself and when asked when his symptoms started he stated they began in July. EMS further noted unsteady gait, and that the patient complained of bilateral forearm and lower extremity pain with edema. Patient denies Hx of kidney disease. Patient also denies Hx of MI and CVA. Patient further denies cough, shortness of breath, chest pain, fever, chills, abdominal pain, nausea, vomiting, diarrhea, dysuria, headaches, congestion, sore throat, eye pain, ear pain, rash, or other associated symptoms. Patient denies recreational drug use; endorses tobacco use. This is the extent of the patient's complaints in the ED. Patient is allergic to NSAID'S.      The history is provided by the patient. No  was used.     Review of patient's allergies indicates:  "  Allergen Reactions    Nsaids (non-steroidal anti-inflammatory drug) Swelling     " my face swells"     History reviewed. No pertinent past medical history.  History reviewed. No pertinent surgical history.  History reviewed. No pertinent family history.  Social History     Tobacco Use    Smoking status: Every Day     Current packs/day: 0.00    Smokeless tobacco: Never   Substance Use Topics    Alcohol use: Yes     Comment: occasional    Drug use: Never     Review of Systems   Constitutional:  Negative for chills, diaphoresis and fever.   HENT:  Negative for congestion, ear pain and sore throat.    Eyes:  Negative for pain and visual disturbance.   Respiratory:  Negative for cough and shortness of breath.    Cardiovascular:  Positive for leg swelling (Bilateral). Negative for chest pain.        (+) Edema in Feet     Gastrointestinal:  Negative for abdominal pain, diarrhea, nausea and vomiting.   Genitourinary:  Negative for dysuria.   Musculoskeletal:  Negative for myalgias.   Skin:  Negative for rash.   Neurological:  Negative for headaches.   Psychiatric/Behavioral:          (+) Altered Mental Status         Physical Exam     Initial Vitals [08/14/23 0902]   BP Pulse Resp Temp SpO2   (!) 142/60 (!) 120 20 98 °F (36.7 °C) 99 %      MAP       --         Physical Exam  The patient was examined specifically for the following:   General:No significant distress, Good color, Warm and dry. Head and neck:Scalp atraumatic, Neck supple. Neurological:Appropriate conversation, Gross motor deficits. Eyes:Conjugate gaze, Clear corneas. ENT: No epistaxis. Cardiac: Regular rate and rhythm, Grossly normal heart tones. Pulmonary: Wheezing, Rales. Gastrointestinal: Abdominal tenderness, Abdominal distention. Musculoskeletal: Extremity deformity, Apparent pain with range of motion of the joints. Skin: Rash.   The findings on examination were normal except for the following:  The patient has severe bilateral lower extremity pedal " edema from the knees to the feet which are most affected.  The patient is pale.  The heart rate is 130 during the physical exam.  ED Course   Critical Care    Date/Time: 8/14/2023 2:08 PM    Performed by: Marc Alexandra MD  Authorized by: Marc Alexandra MD  Direct patient critical care time: 22 minutes  Additional history critical care time: 11 minutes  Ordering / reviewing critical care time: 11 minutes  Documentation critical care time: 11 minutes  Consulting other physicians critical care time: 11 minutes  Consult with family critical care time: 5 minutes  Total critical care time (exclusive of procedural time) : 71 minutes  Critical care time was exclusive of separately billable procedures and treating other patients and teaching time.  Critical care was necessary to treat or prevent imminent or life-threatening deterioration of the following conditions: metabolic crisis and CNS failure or compromise.  Critical care was time spent personally by me on the following activities: development of treatment plan with patient or surrogate, discussions with primary provider, evaluation of patient's response to treatment, examination of patient, obtaining history from patient or surrogate, ordering and performing treatments and interventions, ordering and review of laboratory studies, ordering and review of radiographic studies, pulse oximetry, re-evaluation of patient's condition and review of old charts.        Labs Reviewed   CBC W/ AUTO DIFFERENTIAL - Abnormal; Notable for the following components:       Result Value    RBC 2.29 (*)     Hemoglobin 10.1 (*)     Hematocrit 27.5 (*)      (*)     MCH 44.1 (*)     MCHC 36.7 (*)     RDW 16.0 (*)     Immature Granulocytes 0.7 (*)     Gran # (ANC) 10.7 (*)     Immature Grans (Abs) 0.08 (*)     Lymph # 0.7 (*)     Gran % 88.7 (*)     Lymph % 5.6 (*)     All other components within normal limits   COMPREHENSIVE METABOLIC PANEL - Abnormal; Notable for the following  components:    Sodium 132 (*)     Potassium 2.9 (*)     Chloride 91 (*)     CO2 17 (*)     Calcium 11.6 (*)     Albumin 3.2 (*)     Total Bilirubin 3.0 (*)     AST 63 (*)     ALT 54 (*)     Anion Gap 24 (*)     All other components within normal limits   LACTIC ACID, PLASMA - Abnormal; Notable for the following components:    Lactate (Lactic Acid) 10.3 (*)     All other components within normal limits    Narrative:     LA critical result(s) called and verbal readback obtained from Isabelle Amezquiat RN by [facility] 08/14/2023 10:32   URINALYSIS, REFLEX TO URINE CULTURE - Abnormal; Notable for the following components:    Color, UA Orange (*)     Appearance, UA Hazy (*)     Protein, UA 1+ (*)     Ketones, UA Trace (*)     Urobilinogen, UA 2.0-3.0 (*)     All other components within normal limits    Narrative:     Specimen Source->Urine   PHOSPHORUS - Abnormal; Notable for the following components:    Phosphorus 4.8 (*)     All other components within normal limits   URINALYSIS MICROSCOPIC - Abnormal; Notable for the following components:    Hyaline Casts, UA 9 (*)     Ca Oxalate Annika, UA Many (*)     All other components within normal limits    Narrative:     Specimen Source->Urine   CULTURE, BLOOD   CULTURE, BLOOD   MAGNESIUM   PROCALCITONIN   TROPONIN I   B-TYPE NATRIURETIC PEPTIDE   PROTIME-INR   ALCOHOL,MEDICAL (ETHANOL)   DRUG SCREEN PANEL, URINE EMERGENCY    Narrative:     Specimen Source->Urine   AMMONIA   TSH   FOLATE   VITAMIN B12   LACTIC ACID, PLASMA   FOLATE   VITAMIN B12          Imaging Results              US Lower Extremity Veins Bilateral (Final result)  Result time 08/14/23 12:35:13   Procedure changed from US Lower Extremity Veins Bilateral     Final result by Sunil Kemp MD (08/14/23 12:35:13)                   Impression:      No evidence of deep venous thrombosis in either lower extremity.      Electronically signed by: Sunil Kemp MD  Date:    08/14/2023  Time:    12:35               Narrative:     EXAMINATION:  US LOWER EXTREMITY VEINS BILATERAL    CLINICAL HISTORY:  Other specified soft tissue disorders    TECHNIQUE:  Duplex and color flow Doppler and dynamic compression was performed of the bilateral lower extremity veins was performed.    COMPARISON:  None    FINDINGS:  Right thigh veins: The common femoral, femoral, popliteal, upper greater saphenous, and deep femoral veins are patent and free of thrombus. The veins are normally compressible and have normal phasic flow and augmentation response.    Right calf veins: The visualized calf veins are patent.    Left thigh veins: The common femoral, femoral, popliteal, upper greater saphenous, and deep femoral veins are patent and free of thrombus. The veins are normally compressible and have normal phasic flow and augmentation response.    Left calf veins: The visualized calf veins are patent.    Miscellaneous: None                                       US Abdomen Limited (Final result)  Result time 08/14/23 12:29:40      Final result by Zhao Davila MD (08/14/23 12:29:40)                   Impression:      1. Biliary sludge.  No convincing evidence of acute cholecystitis by provided sonographic images.  2. Additional details, as provided in the body of report.      Electronically signed by: Zhao Davila  Date:    08/14/2023  Time:    12:29               Narrative:    EXAMINATION:  US ABDOMEN LIMITED    CLINICAL HISTORY:  Hyperbilirubinemia;    TECHNIQUE:  Limited ultrasound of the right upper quadrant of the abdomen (including pancreas, liver, gallbladder, common bile duct, and spleen) was performed.    COMPARISON:  None.    FINDINGS:  Gallbladder: Biliary sludge.  No discrete shadowing calculi, wall thickening, or pericholecystic fluid.  No sonographic Adam's sign.    Biliary system: The common duct is not dilated, measuring 2 mm.  No intrahepatic ductal dilatation.    Miscellaneous: No upper abdominal ascites.  Visualized portions of the pancreas  and right kidney are grossly unremarkable.  Suspect hepatic steatosis.  Liver measures up to at least 718.8 cm.                                       CT Head Without Contrast (Final result)  Result time 08/14/23 11:09:45      Final result by Zhao Davila MD (08/14/23 11:09:45)                   Impression:      No acute intracranial findings.      Electronically signed by: Zhao Davila  Date:    08/14/2023  Time:    11:09               Narrative:    EXAMINATION:  CT HEAD WITHOUT CONTRAST    CLINICAL HISTORY:  Mental status change, unknown cause; Altered mental status, unspecified    TECHNIQUE:  Low dose axial images were obtained through the head.  Coronal and sagittal reformations were also performed. Contrast was not administered.    COMPARISON:  None.    FINDINGS:  Blood: No acute intracranial hemorrhage.    Parenchyma: No definite loss of gray-white differentiation to suggest acute or subacute transcortical infarct.    Ventricles/Extra-axial spaces: No abnormal extra-axial fluid collection. Basal cisterns are patent.    Vessels: Grossly unremarkable by unenhanced technique.    Orbits: Unremarkable.    Scalp: Unremarkable.    Skull: There are no depressed skull fractures or destructive bone lesions.    Sinuses and mastoids: Retention cyst right maxillary sinus, partially imaged.  Modest paranasal sinus mucosal thickening elsewhere.    Other findings: None                                       X-Ray Chest AP Portable (Final result)  Result time 08/14/23 10:20:24      Final result by Yosi Franco MD (08/14/23 10:20:24)                   Impression:      Unremarkable examination.      Electronically signed by: Yosi Franco  Date:    08/14/2023  Time:    10:20               Narrative:    EXAMINATION:  XR CHEST AP PORTABLE    CLINICAL HISTORY:  Sepsis;    TECHNIQUE:  Single frontal view of the chest was performed.    COMPARISON:  None    FINDINGS:  Lines and tubes: Monitoring leads overlie the  patient.    Heart and mediastinum: Unremarkable.    Pleura: No pleural effusion or pneumothorax.    Lungs: Lungs are well inflated. No focal consolidations or evidence of pulmonary edema.    Soft tissue/bone: Unremarkable.                                    Medical decision making: Given the above this patient presents emergency with an altered mental status.  He has a history of liver disease.  He has a history of alcoholism.  He has severe bilateral pitting edema in the lower extremities.  Ultrasound is negative for deep venous thrombosis.  CT of the head fails to reveal structural brain abnormalities.  Ultrasound of the gallbladder is negative for biliary obstruction. .  He has a mild transaminitis.  The patient has a borderline low albumin at 3.2.  Calcium is high at 11.6 with a phosphorus of 4.8 also high.  The patient has an elevated anion gap at 24 and a low CO2 at 17.  I do not know the cause for this metabolic acidosis.  The chloride is low in the potassium is low.  The potassium is low at 2.9.  This will require treatment.  The white blood count is normal there is no leukocytosis to suggest infection.  The patient has been persistently tachycardic.  His heart rate is 114.  The procalcitonin is normal at 0.13.  Troponin and BNP are normal against congestive heart failure myocardial infarction.  I do not know the cause of this altered mental status the ammonia is normal as well.  The alcohol level is 0.  The tox screen is negative.  I will admit to hospital medicine for further evaluation and treatment.       Medications   piperacillin-tazobactam (ZOSYN) 4.5 g in dextrose 5 % in water (D5W) 100 mL IVPB (MB+) (0 g Intravenous Stopped 8/14/23 1158)   thiamine (B-1) 100 mg in dextrose 5 % (D5W) 100 mL IVPB (0 mg Intravenous Stopped 8/14/23 1158)              Scribe Attestation:   Scribe #1: I performed the above scribed service and the documentation accurately describes the services I performed. I attest to the  accuracy of the note.                   I personally performed the services described in this documentation.  All medical record  entries made by the scribe are at my direction and in my presence.  Signed, Dr. Alexandra  Clinical Impression:   Final diagnoses:  [R00.0] Sinus tachycardia  [R41.82] Altered mental status  [M79.89] Leg swelling  [E80.6] Hyperbilirubinemia (Primary)  [E87.8] Electrolyte abnormality  [E87.6] Hypokalemia  [E83.52] Hypercalcemia        ED Disposition Condition    Admit Stable                Marc Alexandra MD  08/14/23 4734

## 2023-08-14 NOTE — ASSESSMENT & PLAN NOTE
Check Protein,creatin  Ratio,has normal BNP ad no sign of fluid overload on chest X ray.leg US is negative,has borderline low albumin,stared on supplement,will monitor.

## 2023-08-14 NOTE — SUBJECTIVE & OBJECTIVE
"History reviewed. No pertinent past medical history.    History reviewed. No pertinent surgical history.    Review of patient's allergies indicates:   Allergen Reactions    Nsaids (non-steroidal anti-inflammatory drug) Swelling     " my face swells"    Cyclobenzaprine        No current facility-administered medications on file prior to encounter.     Current Outpatient Medications on File Prior to Encounter   Medication Sig    furosemide (LASIX) 40 MG tablet Take 1 tablet (40 mg total) by mouth once daily. for 2 days     Family History    None       Tobacco Use    Smoking status: Every Day     Current packs/day: 0.00    Smokeless tobacco: Never   Substance and Sexual Activity    Alcohol use: Yes     Comment: occasional- 8/14/23: states recovered alcoholic for 2 years    Drug use: Never    Sexual activity: Not on file     Review of Systems   Constitutional:  Positive for activity change and appetite change.   HENT:  Negative for congestion and dental problem.    Eyes:  Negative for discharge and itching.   Respiratory:  Negative for apnea and chest tightness.    Gastrointestinal:  Negative for abdominal distention.   Endocrine: Negative for cold intolerance.   Genitourinary:  Negative for difficulty urinating and dysuria.   Musculoskeletal:  Negative for arthralgias and back pain.   Skin:  Negative for color change and pallor.   Allergic/Immunologic: Negative for environmental allergies.   Neurological:  Positive for weakness. Negative for dizziness and facial asymmetry.   Psychiatric/Behavioral:  Positive for confusion.      Objective:     Vital Signs (Most Recent):  Temp: 97.9 °F (36.6 °C) (08/14/23 1402)  Pulse: 108 (08/14/23 1454)  Resp: 14 (08/14/23 1454)  BP: (!) 100/56 (08/14/23 1432)  SpO2: 100 % (08/14/23 1454) Vital Signs (24h Range):  Temp:  [97.9 °F (36.6 °C)-98.1 °F (36.7 °C)] 97.9 °F (36.6 °C)  Pulse:  [108-130] 108  Resp:  [13-28] 14  SpO2:  [86 %-100 %] 100 %  BP: (100-142)/(52-85) 100/56     Weight: " 74.8 kg (165 lb)  Body mass index is 23.68 kg/m².     Physical Exam  HENT:      Head: Atraumatic.      Nose: Nose normal. No congestion.      Mouth/Throat:      Mouth: Mucous membranes are dry.      Pharynx: No oropharyngeal exudate.   Eyes:      Extraocular Movements: Extraocular movements intact.      Pupils: Pupils are equal, round, and reactive to light.   Cardiovascular:      Rate and Rhythm: Normal rate.      Heart sounds: No murmur heard.  Pulmonary:      Effort: No respiratory distress.      Breath sounds: No wheezing.   Abdominal:      General: There is no distension.   Musculoskeletal:         General: Swelling present. Normal range of motion.      Cervical back: Normal range of motion.   Skin:     Coloration: Skin is not jaundiced.      Findings: No bruising.   Neurological:      General: No focal deficit present.      Mental Status: He is alert.      Cranial Nerves: No cranial nerve deficit.      Motor: No weakness.   Psychiatric:      Comments: Confused               CRANIAL NERVES     CN III, IV, VI   Pupils are equal, round, and reactive to light.       Significant Labs: All pertinent labs within the past 24 hours have been reviewed.  BMP:   Recent Labs   Lab 08/14/23  0931   GLU 95   *   K 2.9*   CL 91*   CO2 17*   BUN 14   CREATININE 0.7   CALCIUM 11.6*   MG 1.7     CBC:   Recent Labs   Lab 08/14/23  0931   WBC 12.06   HGB 10.1*   HCT 27.5*        CMP:   Recent Labs   Lab 08/14/23  0931   *   K 2.9*   CL 91*   CO2 17*   GLU 95   BUN 14   CREATININE 0.7   CALCIUM 11.6*   PROT 6.6   ALBUMIN 3.2*   BILITOT 3.0*   ALKPHOS 124   AST 63*   ALT 54*   ANIONGAP 24*       Significant Imaging: I have reviewed all pertinent imaging results/findings within the past 24 hours.

## 2023-08-14 NOTE — ED NOTES
"Sony Smith, a 37 y.o. male presents to the ED after roommate called out for altered mental status. Patient was found incoherent by roommate this AM and not answering appropriately to roommate. Upon EMS arrival, patient GCS 15, AAOx2. Patient with unsteady gait at home. Patient found to repeat himself multiple times. Patient also with bilat pitting edema to LE localized at feet. Pain and swelling noted.      Triage note:  Chief Complaint   Patient presents with    Altered Mental Status     Pt BIB EMS from home for altered mental status. EMS report Pt GCS 15, alert to person. EMS reported Pt repeats himself, does not where he is and timeline is off. EMS reported unsteady gait, and c/o bilateral forearm, and lower extremity pain with edema.     Review of patient's allergies indicates:   Allergen Reactions    Nsaids (non-steroidal anti-inflammatory drug) Swelling     " my face swells"     History reviewed. No pertinent past medical history.    "

## 2023-08-15 PROBLEM — D69.6 THROMBOCYTOPENIA: Status: ACTIVE | Noted: 2023-08-15

## 2023-08-15 PROBLEM — R60.0 LOWER EXTREMITY EDEMA: Status: ACTIVE | Noted: 2023-08-14

## 2023-08-15 PROBLEM — E53.8 FOLATE DEFICIENCY: Status: ACTIVE | Noted: 2023-08-15

## 2023-08-15 PROBLEM — E87.20 LACTIC ACID ACIDOSIS: Status: RESOLVED | Noted: 2023-08-14 | Resolved: 2023-08-15

## 2023-08-15 PROBLEM — D53.9 MACROCYTIC ANEMIA: Status: ACTIVE | Noted: 2023-08-15

## 2023-08-15 LAB
ALBUMIN SERPL BCP-MCNC: 2.6 G/DL (ref 3.5–5.2)
ALP SERPL-CCNC: 95 U/L (ref 55–135)
ALT SERPL W/O P-5'-P-CCNC: 43 U/L (ref 10–44)
ANION GAP SERPL CALC-SCNC: 9 MMOL/L (ref 8–16)
AORTIC ROOT ANNULUS: 3.38 CM
AORTIC VALVE CUSP SEPERATION: 2.12 CM
ASCENDING AORTA: 3.05 CM
AST SERPL-CCNC: 63 U/L (ref 10–40)
AV INDEX (PROSTH): 0.77
AV MEAN GRADIENT: 4 MMHG
AV PEAK GRADIENT: 6 MMHG
AV VALVE AREA BY VELOCITY RATIO: 3.21 CM²
AV VALVE AREA: 2.99 CM²
AV VELOCITY RATIO: 0.82
BASOPHILS # BLD AUTO: 0.01 K/UL (ref 0–0.2)
BASOPHILS NFR BLD: 0.2 % (ref 0–1.9)
BILIRUB SERPL-MCNC: 1.4 MG/DL (ref 0.1–1)
BSA FOR ECHO PROCEDURE: 1.8 M2
BUN SERPL-MCNC: 17 MG/DL (ref 6–20)
CALCIUM SERPL-MCNC: 8.6 MG/DL (ref 8.7–10.5)
CHLORIDE SERPL-SCNC: 95 MMOL/L (ref 95–110)
CO2 SERPL-SCNC: 29 MMOL/L (ref 23–29)
CREAT SERPL-MCNC: 0.7 MG/DL (ref 0.5–1.4)
CREAT UR-MCNC: 40.5 MG/DL (ref 23–375)
CV ECHO LV RWT: 0.44 CM
DIFFERENTIAL METHOD: ABNORMAL
DOP CALC AO PEAK VEL: 1.24 M/S
DOP CALC AO VTI: 27.9 CM
DOP CALC LVOT AREA: 3.9 CM2
DOP CALC LVOT DIAMETER: 2.23 CM
DOP CALC LVOT PEAK VEL: 1.02 M/S
DOP CALC LVOT STROKE VOLUME: 83.54 CM3
DOP CALCLVOT PEAK VEL VTI: 21.4 CM
E WAVE DECELERATION TIME: 162.87 MSEC
E/A RATIO: 1.28
E/E' RATIO: 6.69 M/S
ECHO LV POSTERIOR WALL: 1.08 CM (ref 0.6–1.1)
EOSINOPHIL # BLD AUTO: 0 K/UL (ref 0–0.5)
EOSINOPHIL NFR BLD: 0.5 % (ref 0–8)
ERYTHROCYTE [DISTWIDTH] IN BLOOD BY AUTOMATED COUNT: 15.8 % (ref 11.5–14.5)
EST. GFR  (NO RACE VARIABLE): >60 ML/MIN/1.73 M^2
FRACTIONAL SHORTENING: 29 % (ref 28–44)
GLUCOSE SERPL-MCNC: 70 MG/DL (ref 70–110)
HAV IGM SERPL QL IA: NORMAL
HBV CORE IGM SERPL QL IA: NORMAL
HBV SURFACE AG SERPL QL IA: NORMAL
HCT VFR BLD AUTO: 23.6 % (ref 40–54)
HCV AB SERPL QL IA: NORMAL
HGB BLD-MCNC: 8.3 G/DL (ref 14–18)
IMM GRANULOCYTES # BLD AUTO: 0.02 K/UL (ref 0–0.04)
IMM GRANULOCYTES NFR BLD AUTO: 0.4 % (ref 0–0.5)
INTERVENTRICULAR SEPTUM: 0.89 CM (ref 0.6–1.1)
IVC DIAMETER: 2.42 CM
LA MAJOR: 5.48 CM
LA MINOR: 5.02 CM
LA WIDTH: 3.8 CM
LACTATE SERPL-SCNC: 1 MMOL/L (ref 0.5–2.2)
LEFT ATRIUM SIZE: 4.63 CM
LEFT ATRIUM VOLUME INDEX: 43.3 ML/M2
LEFT ATRIUM VOLUME: 78.36 CM3
LEFT INTERNAL DIMENSION IN SYSTOLE: 3.53 CM (ref 2.1–4)
LEFT VENTRICLE DIASTOLIC VOLUME INDEX: 63.91 ML/M2
LEFT VENTRICLE DIASTOLIC VOLUME: 115.68 ML
LEFT VENTRICLE MASS INDEX: 97 G/M2
LEFT VENTRICLE SYSTOLIC VOLUME INDEX: 28.8 ML/M2
LEFT VENTRICLE SYSTOLIC VOLUME: 52.06 ML
LEFT VENTRICULAR INTERNAL DIMENSION IN DIASTOLE: 4.95 CM (ref 3.5–6)
LEFT VENTRICULAR MASS: 175.39 G
LV LATERAL E/E' RATIO: 6.21 M/S
LV SEPTAL E/E' RATIO: 7.25 M/S
LVOT MG: 2.37 MMHG
LVOT MV: 0.72 CM/S
LYMPHOCYTES # BLD AUTO: 1.4 K/UL (ref 1–4.8)
LYMPHOCYTES NFR BLD: 24.5 % (ref 18–48)
MAGNESIUM SERPL-MCNC: 1.8 MG/DL (ref 1.6–2.6)
MCH RBC QN AUTO: 43.7 PG (ref 27–31)
MCHC RBC AUTO-ENTMCNC: 35.2 G/DL (ref 32–36)
MCV RBC AUTO: 124 FL (ref 82–98)
MONOCYTES # BLD AUTO: 0.3 K/UL (ref 0.3–1)
MONOCYTES NFR BLD: 5.8 % (ref 4–15)
MV PEAK A VEL: 0.68 M/S
MV PEAK E VEL: 0.87 M/S
NEUTROPHILS # BLD AUTO: 3.9 K/UL (ref 1.8–7.7)
NEUTROPHILS NFR BLD: 68.6 % (ref 38–73)
NRBC BLD-RTO: 0 /100 WBC
PISA TR MAX VEL: 1.96 M/S
PLATELET # BLD AUTO: 113 K/UL (ref 150–450)
PMV BLD AUTO: 9.4 FL (ref 9.2–12.9)
POCT GLUCOSE: 100 MG/DL (ref 70–110)
POTASSIUM SERPL-SCNC: 2.9 MMOL/L (ref 3.5–5.1)
POTASSIUM SERPL-SCNC: 3.4 MMOL/L (ref 3.5–5.1)
PROT SERPL-MCNC: 5.1 G/DL (ref 6–8.4)
PROT UR-MCNC: <7 MG/DL
PROT/CREAT UR: NORMAL MG/G{CREAT} (ref 0–0.2)
PULM VEIN S/D RATIO: 1.36
PV PEAK D VEL: 0.55 M/S
PV PEAK GRADIENT: 4 MMHG
PV PEAK S VEL: 0.75 M/S
PV PEAK VELOCITY: 1.06 M/S
RA MAJOR: 4.79 CM
RA PRESSURE ESTIMATED: 15 MMHG
RA WIDTH: 4.2 CM
RBC # BLD AUTO: 1.9 M/UL (ref 4.6–6.2)
RIGHT VENTRICULAR END-DIASTOLIC DIMENSION: 3.9 CM
RV TB RVSP: 17 MMHG
RV TISSUE DOPPLER FREE WALL SYSTOLIC VELOCITY 1 (APICAL 4 CHAMBER VIEW): 14.12 CM/S
SINUS: 3.42 CM
SODIUM SERPL-SCNC: 133 MMOL/L (ref 136–145)
STJ: 2.45 CM
TDI LATERAL: 0.14 M/S
TDI SEPTAL: 0.12 M/S
TDI: 0.13 M/S
TR MAX PG: 15 MMHG
TRICUSPID ANNULAR PLANE SYSTOLIC EXCURSION: 1.85 CM
TV REST PULMONARY ARTERY PRESSURE: 30 MMHG
WBC # BLD AUTO: 5.67 K/UL (ref 3.9–12.7)
Z-SCORE OF LEFT VENTRICULAR DIMENSION IN END DIASTOLE: -0.12
Z-SCORE OF LEFT VENTRICULAR DIMENSION IN END SYSTOLE: 1.04

## 2023-08-15 PROCEDURE — 36415 COLL VENOUS BLD VENIPUNCTURE: CPT | Performed by: HOSPITALIST

## 2023-08-15 PROCEDURE — S4991 NICOTINE PATCH NONLEGEND: HCPCS | Performed by: PHYSICIAN ASSISTANT

## 2023-08-15 PROCEDURE — 97166 OT EVAL MOD COMPLEX 45 MIN: CPT

## 2023-08-15 PROCEDURE — 25000003 PHARM REV CODE 250

## 2023-08-15 PROCEDURE — 63600175 PHARM REV CODE 636 W HCPCS: Performed by: HOSPITALIST

## 2023-08-15 PROCEDURE — 25000003 PHARM REV CODE 250: Performed by: HOSPITALIST

## 2023-08-15 PROCEDURE — 85025 COMPLETE CBC W/AUTO DIFF WBC: CPT | Performed by: HOSPITALIST

## 2023-08-15 PROCEDURE — 25000003 PHARM REV CODE 250: Performed by: PHYSICIAN ASSISTANT

## 2023-08-15 PROCEDURE — 97162 PT EVAL MOD COMPLEX 30 MIN: CPT

## 2023-08-15 PROCEDURE — 83605 ASSAY OF LACTIC ACID: CPT | Performed by: HOSPITALIST

## 2023-08-15 PROCEDURE — 80053 COMPREHEN METABOLIC PANEL: CPT | Performed by: HOSPITALIST

## 2023-08-15 PROCEDURE — 97535 SELF CARE MNGMENT TRAINING: CPT

## 2023-08-15 PROCEDURE — 83735 ASSAY OF MAGNESIUM: CPT | Performed by: HOSPITALIST

## 2023-08-15 PROCEDURE — 84132 ASSAY OF SERUM POTASSIUM: CPT | Performed by: HOSPITALIST

## 2023-08-15 PROCEDURE — 11000001 HC ACUTE MED/SURG PRIVATE ROOM

## 2023-08-15 PROCEDURE — 82570 ASSAY OF URINE CREATININE: CPT | Performed by: HOSPITALIST

## 2023-08-15 PROCEDURE — 97530 THERAPEUTIC ACTIVITIES: CPT

## 2023-08-15 RX ORDER — IBUPROFEN 200 MG
1 TABLET ORAL DAILY
Status: DISCONTINUED | OUTPATIENT
Start: 2023-08-15 | End: 2023-08-19

## 2023-08-15 RX ORDER — POTASSIUM CHLORIDE 20 MEQ/1
40 TABLET, EXTENDED RELEASE ORAL ONCE
Status: COMPLETED | OUTPATIENT
Start: 2023-08-15 | End: 2023-08-15

## 2023-08-15 RX ORDER — FUROSEMIDE 10 MG/ML
40 INJECTION INTRAMUSCULAR; INTRAVENOUS ONCE
Status: COMPLETED | OUTPATIENT
Start: 2023-08-15 | End: 2023-08-15

## 2023-08-15 RX ORDER — ONDANSETRON 2 MG/ML
4 INJECTION INTRAMUSCULAR; INTRAVENOUS EVERY 6 HOURS PRN
Status: DISCONTINUED | OUTPATIENT
Start: 2023-08-15 | End: 2023-08-28 | Stop reason: HOSPADM

## 2023-08-15 RX ORDER — SODIUM,POTASSIUM PHOSPHATES 280-250MG
2 POWDER IN PACKET (EA) ORAL
Status: DISCONTINUED | OUTPATIENT
Start: 2023-08-15 | End: 2023-08-15

## 2023-08-15 RX ORDER — ACETAMINOPHEN 325 MG/1
650 TABLET ORAL EVERY 6 HOURS PRN
Status: DISCONTINUED | OUTPATIENT
Start: 2023-08-15 | End: 2023-08-28 | Stop reason: HOSPADM

## 2023-08-15 RX ORDER — LANOLIN ALCOHOL/MO/W.PET/CERES
400 CREAM (GRAM) TOPICAL ONCE
Status: COMPLETED | OUTPATIENT
Start: 2023-08-15 | End: 2023-08-15

## 2023-08-15 RX ORDER — POTASSIUM CHLORIDE 20 MEQ/1
40 TABLET, EXTENDED RELEASE ORAL EVERY 4 HOURS
Status: COMPLETED | OUTPATIENT
Start: 2023-08-15 | End: 2023-08-15

## 2023-08-15 RX ORDER — HYDROCODONE BITARTRATE AND ACETAMINOPHEN 5; 325 MG/1; MG/1
1 TABLET ORAL ONCE
Status: COMPLETED | OUTPATIENT
Start: 2023-08-15 | End: 2023-08-15

## 2023-08-15 RX ORDER — DEXTROSE MONOHYDRATE, SODIUM CHLORIDE, AND POTASSIUM CHLORIDE 50; 1.49; 4.5 G/1000ML; G/1000ML; G/1000ML
INJECTION, SOLUTION INTRAVENOUS CONTINUOUS
Status: DISCONTINUED | OUTPATIENT
Start: 2023-08-15 | End: 2023-08-15

## 2023-08-15 RX ADMIN — HEPARIN SODIUM 5000 UNITS: 5000 INJECTION INTRAVENOUS; SUBCUTANEOUS at 01:08

## 2023-08-15 RX ADMIN — HEPARIN SODIUM 5000 UNITS: 5000 INJECTION INTRAVENOUS; SUBCUTANEOUS at 05:08

## 2023-08-15 RX ADMIN — DIAZEPAM 5 MG: 5 TABLET ORAL at 09:08

## 2023-08-15 RX ADMIN — HEPARIN SODIUM 5000 UNITS: 5000 INJECTION INTRAVENOUS; SUBCUTANEOUS at 09:08

## 2023-08-15 RX ADMIN — THIAMINE HYDROCHLORIDE 500 MG: 100 INJECTION, SOLUTION INTRAMUSCULAR; INTRAVENOUS at 08:08

## 2023-08-15 RX ADMIN — DIAZEPAM 5 MG: 5 TABLET ORAL at 05:08

## 2023-08-15 RX ADMIN — Medication 1 PATCH: at 08:08

## 2023-08-15 RX ADMIN — THERA TABS 1 TABLET: TAB at 08:08

## 2023-08-15 RX ADMIN — HYDROCODONE BITARTRATE AND ACETAMINOPHEN 1 TABLET: 5; 325 TABLET ORAL at 05:08

## 2023-08-15 RX ADMIN — THIAMINE HYDROCHLORIDE 500 MG: 100 INJECTION, SOLUTION INTRAMUSCULAR; INTRAVENOUS at 02:08

## 2023-08-15 RX ADMIN — POTASSIUM CHLORIDE 40 MEQ: 1500 TABLET, EXTENDED RELEASE ORAL at 05:08

## 2023-08-15 RX ADMIN — POTASSIUM CHLORIDE 40 MEQ: 1500 TABLET, EXTENDED RELEASE ORAL at 10:08

## 2023-08-15 RX ADMIN — DIAZEPAM 5 MG: 5 TABLET ORAL at 01:08

## 2023-08-15 RX ADMIN — Medication 400 MG: at 10:08

## 2023-08-15 RX ADMIN — FUROSEMIDE 40 MG: 10 INJECTION, SOLUTION INTRAMUSCULAR; INTRAVENOUS at 05:08

## 2023-08-15 RX ADMIN — POTASSIUM CHLORIDE 40 MEQ: 1500 TABLET, EXTENDED RELEASE ORAL at 09:08

## 2023-08-15 RX ADMIN — FOLIC ACID 1 MG: 1 TABLET ORAL at 08:08

## 2023-08-15 NOTE — PT/OT/SLP EVAL
Occupational Therapy Evaluation and Treatment    Name: Sony Smith  MRN: [id number]  Admitting Diagnosis: Acute metabolic encephalopathy  Recent Surgery: * No surgery found *      Recommendations:     Discharge Recommendations: other (see comments) (ongoing/TBD)  Level of Assistance Recommended: 24 hours supervision  Discharge Equipment Recommendations: raised toilet  Barriers to discharge: Other (Comment) (insurance?)    Assessment:     Sony Smith is a 37 y.o. male with a medical diagnosis of Acute metabolic encephalopathy. He presents with performance deficits affecting function including weakness, impaired endurance, impaired self care skills, impaired functional mobility, gait instability, impaired balance, impaired cognition, decreased coordination, decreased lower extremity function, decreased safety awareness, pain, decreased ROM, impaired coordination, edema. Patient in bed with HOB elevated and no oxygen donned. He did not c/o pain until he tried to put on socks on edematous feet.     Rehab Prognosis: Good; patient would benefit from acute OT services to address these deficits and reach maximum level of function.    Plan:     Patient to be seen 3 x/week to address the above listed problems via self-care/home management, therapeutic activities, therapeutic exercises  Plan of Care Expires: 08/29/23  Plan of Care Reviewed with: patient    Subjective     Chief Complaint: balance issues and lower extremity edema   Patient Comments/Goals: return home and return to work   Pain/Comfort:  Pain Rating 1: 5/10  Location - Side 1: Bilateral  Location 1: foot  Pain Addressed 1: Reposition, Cessation of Activity    Patients cultural, spiritual, Worship conflicts given the current situation: no    Social History:  Living Environment: Patient lives with a roommate in a second floor apartment?? (Pt. Could not answer between house and apt)  with number of outside stair(s): 15   Prior Level of Function: Prior to  admission, patient was independent  Roles and Routines: Patient was driving and working as flight  civilian, is also a   prior to admission.  Equipment Used at Home: walker, rolling, cane, quad, walker, standard, shower chair  DME owned (not currently used): none  Assistance Upon Discharge: friend(s)    Objective:     Communicated with RNMichelle,  prior to session. Patient found HOB elevated with telemetry, peripheral IV, bed alarm upon OT entry to room.    General Precautions: Standard, fall   Orthopedic Precautions: N/A   Braces: N/A    Respiratory Status: Room air    Occupational Performance: pt. Works as a flight  civilian, is a      Gait belt applied - Yes    Bed Mobility:   Rolling/Turning to Left with supervision with cues needed to bring legs onto floor   Scooting anteriorly to EOB to have both feet planted on floor: supervision  Supine to sit from left side of bed with supervision    Functional Mobility/Transfers:  Sit <> Stand Transfer with minimum assistance with rolling walker  Bed <> Chair Transfer using Step Transfer technique with minimum assistance with rolling walker  Functional Mobility: Patient took 4-5 steps forward out of bed to chair with RW with decreased balance noted with min assist to place hands on RW after holding onto wall.     Activities of Daily Living:  Grooming: set up assistance  Upper Body Dressing: stand by assistance  Lower Body Dressing: maximal assistance to don socks due to 4+ pitting edema in both feet     Cognitive/Visual Perceptual:  Cognitive/Psychosocial Skills:    -     Oriented to: Person; pt. Disoriented to place (did not know hosp. Room) and could not name date or time, but knew birthdate  Visual/Perceptual:    -     Intact    Physical Exam:  Balance:    -     Sitting: modified independence  -     Standing: minimum assistance  Postural examination/scapula alignment:    -       No postural abnormalities identified  Skin integrity:  Visible skin intact  Edema:  Severe 4+ B feet   Sensation:    -       Intact  Motor Planning: Intact  Dominant hand: Right  Upper Extremity Range of Motion:     -       Right Upper Extremity: WNL  -       Left Upper Extremity: WNL  Upper Extremity Strength:    -       Right Upper Extremity: WNL  -       Left Upper Extremity: WNL   Strength:    -       Right Upper Extremity: WNL  -       Left Upper Extremity: WNL  Fine Motor Coordination:    -       Intact  Gross motor coordination:   WFL    AMPAC 6 Click ADL:  AMPAC Total Score: 18    Treatment & Education:  Therapist provided facilitation and instruction of proper body mechanics, energy conservation, and fall prevention strategies during tasks listed above  Patient educated on role of OT, POC, and goals for therapy  Patient educated on importance of OOB activities with staff member assistance and sitting OOB majority of the day  Patient educated re: need for balance therapy due to unsteady balance and gait.   Occupational therapy recommends shower chair and he has one at home to prevent falls.   Occupational therapy provided min to steadying assistance while taking steps and trying to walk with RW due to poor balance. He grabbed onto wall first and would not t/f weight to the RW until occupational therapy assisted with placing hands on RW.   Pt. Is having some dizziness, but no nausea and no visual disturbances   Pt. Has disorientation and poor safety awareness     Patient clear to stand pivot transfer with RN/PCT, assist x1  .    Patient left up in chair with all lines intact, call button in reach, and RN notified.    GOALS:   Multidisciplinary Problems       Occupational Therapy Goals          Problem: Occupational Therapy    Goal Priority Disciplines Outcome Interventions   Occupational Therapy Goal     OT, PT/OT Ongoing, Progressing    Description: Goals to be met by: 8/29/23      Patient will increase functional independence with ADLs by performing:    UE  Dressing with [place].  LE Dressing with [place].  Grooming while standing at sink with [place].  Toileting from toilet with [place] for hygiene and clothing management.   Toilet transfer to toilet with [place].  Increased functional strength to 5/5 for B upper extremity .  Upper extremity exercise program x15-20 reps per handout, with independence.                         History:     History reviewed. No pertinent past medical history.    History reviewed. No pertinent surgical history.    Time Tracking:     OT Date of Treatment: 08/15/23  OT Start Time: 1011  OT Stop Time: 1040  OT Total Time (min): 29 min    Billable Minutes: Evaluation 15 and Self Care/Home Management 14     Occupational therapy second session: 1115-1130am: 15 min. Due to heart ultrasound   8/15/2023

## 2023-08-15 NOTE — PT/OT/SLP EVAL
Physical Therapy Evaluation    Patient Name:  Sony Smith   MRN:  [id number]    Recommendations:     Discharge Recommendations: rehabilitation facility   Discharge Equipment Recommendations:  (TBD)   Barriers to discharge home: Prior to admission patient was independent with mobility and self-care and there is expectation of returning to prior level of function to maintain independence avoiding readmission. Pt is at high risk of unplanned readmission due to fall risk and not being at prior level of function. The lower level of care cannot provide total interdisciplinary approach needed. Pt is able to tolerate 3 hours of daily therapy. Pt is pleasant and motivated to return to prior level of function.  Pt with multiple stairs at home.    Assessment:     Sony Smith is a 37 y.o. male admitted with a medical diagnosis of Acute metabolic encephalopathy.  He presents with the following impairments/functional limitations: weakness, impaired endurance, impaired sensation, impaired self care skills, impaired functional mobility, gait instability, impaired balance, impaired cognition, decreased coordination, decreased upper extremity function, decreased lower extremity function, decreased safety awareness, pain, abnormal tone, decreased ROM, impaired fine motor, edema.    Rehab Prognosis: Good; patient would benefit from acute skilled PT services to address these deficits and reach maximum level of function.    Recent Surgery: * No surgery found *      Plan:     During this hospitalization, patient to be seen 6 x/week to address the identified rehab impairments via gait training, therapeutic activities, therapeutic exercises and progress toward the following goals:    Plan of Care Expires:  08/29/23    Subjective     Chief Complaint: Pt perplexed about his current situation.  Patient/Family Comments/goals: Pt agreeable to limited PT eval, declined gait training at this time.   Pain/Comfort:  Pain Rating 1:  5/10  Location - Side 1: Bilateral  Location 1: leg  Pain Addressed 1: Reposition, Distraction, Cessation of Activity      Living Environment:  Pt unable reliable historian at this time 2* confusion.  Pt reported living in [place] in an elevated 2SH.  Pt stated ~14 DARIA to enter house and his bedroom is on the 2nd floor.  Pt reported that there is a bedroom and full bathroom on the 1st floor that he could use.  Pt traveling to [place] for work with a group of people.  Pt reported that he lives by himself, however the people that he traveled with may be able to provide some assistance.   Prior to admission, patients level of function was independent, working, and driving.  Equipment at home: crutches, forearm.  Pt reported somebody let him borrowed the forearm crutches recently 2* B knees/feet issues.  Upon discharge, patient will have assistance from ?.    Objective:     Patient found up in chair on seat cushion reclined with BLE elevated on pillow with peripheral IV, telemetry (eCozys monitor) upon PT entry to room.    General Precautions: Standard, fall, DT  Orthopedic Precautions:N/A   Braces: N/A  Respiratory Status: Room air    Exams:  Cognitive Exam:  Patient is oriented to Person and Place.  Pt able to state his .  Pt re-oriented to time (month, date, day of the week, and year).  Pt was baffled that he was answering orientation questions incorrectly.   Gross Motor Coordination:  impaired  Postural Exam:  Patient presented with the following abnormalities:    -       No postural abnormalities identified  Sensation:    -       Impaired  light/touch B feet  Skin Integrity/Edema:      -       Skin integrity: Visible skin intact  -       Edema: Moderate-Max B feet  BLE ROM: WFL  BLE Strength: WFL, grossly 3+/5    Functional Mobility:  Pt found sitting up in recliner with mod-max edema to B feet.  Pt was alert and able to carry a conversation, however had some difficulty answering questions.  Pt was  perplexed about his current state of mind and situation, declined further evaluation for standing balance and gait training.  Pt requesting to get back to bed.  PT eval was limited, will continue to assess.    Bed Mobility:     Scooting: contact guard assistance  Sit to Supine: moderate assistance for BLE   Transfers:     Sit to Stand: Pt declined standing trials at this time.  Chair to bed: moderate assistance with  no AD  using  Squat Pivot; Pt with onset of tremors during transfer, decreased LE weight bearing.    Gait: Pt declined any gait training today.  Balance: Pt with fair static sit balance.       AM-PAC 6 CLICK MOBILITY  Total Score:12       Treatment & Education:  Pt received MAX education on acute skilled PT services and goals.  Pt required MAX re-orientation to time and situation.  Pt educated on calling for nursing assistance with OOB activities while in the hospital.  Pt verbalized understanding, however will need reinforcement.  Avasys alarm has been going off throughout the day.        Patient left with bed in chair position and BLE elevated on pillows with all lines intact, call button in reach, bed alarm on, and Avas[employer] monitor present.  Tray table close by.     GOALS:   Multidisciplinary Problems       Physical Therapy Goals          Problem: Physical Therapy    Goal Priority Disciplines Outcome Goal Variances Interventions   Physical Therapy Goal     PT, PT/OT Ongoing, Progressing     Description: Goals to be met by: 23     Patient will increase functional independence with mobility by performin. Supine to sit with Modified [place]  2. Rolling to Left and Right with Modified [place]  3. Sit to stand transfer with Modified [place] using RW  4. Bed to chair transfer with Modified [place]   5. Gait x50 feet with Modified [place] using Rolling Walker   6. Wheelchair propulsion x200 feet with Modified [place] using bilateral upper extremities  7. Lower  extremity exercise program 2 sets x15 reps per handout, with independence                         History:     History reviewed. No pertinent past medical history.    History reviewed. No pertinent surgical history.    Time Tracking:     PT Received On: 08/15/23  PT Start Time: 1416     PT Stop Time: 1440  PT Total Time (min): 24 min     Billable Minutes: Evaluation 14 min and Therapeutic Activity 10 min      08/15/2023

## 2023-08-15 NOTE — NURSING
Any [place] pt sister zak calling NO MANISH on file calling     She states pt was scheduled to return to work today and when pt arrived to work she was turned around because her employer never received an exact pt can return to work     Please call pt or pt  Qamar ( MANISH on file) with [facility]  with any questions    [facility]  Nurses Note -- 4 Eyes      8-14-23      Skin assessed on: Q Shift      [x] No Pressure Injuries Present    []Prevention Measures Documented    [] Yes LDA  for Pressure Injury Previously documented     [] Yes New Pressure Injury Discovered   [] LDA for New Pressure Injury Added      Attending RN:  Cherri Foster RN     Second RN:  Michelle Lou RN

## 2023-08-15 NOTE — PLAN OF CARE
Problem: Occupational Therapy  Goal: Occupational Therapy Goal  Description: Goals to be met by: 8/29/23      Patient will increase functional independence with ADLs by performing:    UE Dressing with [place].  LE Dressing with [place].  Grooming while standing at sink with [place].  Toileting from toilet with [place] for hygiene and clothing management.   Toilet transfer to toilet with [place].  Increased functional strength to 5/5 for B upper extremity .  Upper extremity exercise program x15-20 reps per handout, with independence.    Outcome: Ongoing,    Patient seen by occupational therapy for initial evaluation, so see notes for more info. He is having balance issues and tremors. He does not use DME on a regular basis, but has some at home already. Occupational therapy recommends a shower chair for home use due to prevent falls. Recommendations pending for follow up occupational therapy depending on progress.   Patient would benefit from inpatient rehab due to poor balance, poor orientation to environment, decreased memory, poor sensation, and 4+ pitting edema of B lower extremity  limiting mobility and t/f to toilet.

## 2023-08-15 NOTE — PLAN OF CARE
Problem: Physical Therapy  Goal: Physical Therapy Goal  Description: Goals to be met by: 23     Patient will increase functional independence with mobility by performin. Supine to sit with Modified [place]  2. Rolling to Left and Right with Modified [place]  3. Sit to stand transfer with Modified [place] using RW  4. Bed to chair transfer with Modified [place]   5. Gait x50 feet with Modified [place] using Rolling Walker   6. Wheelchair propulsion x200 feet with Modified [place] using bilateral upper extremities  7. Lower extremity exercise program 2 sets x15 reps per handout, with independence    Outcome: Ongoing, Progressing     Pt unable to ambulate at this time.  Pt transferred bed<>chair with moderate A.

## 2023-08-15 NOTE — NURSING
[facility] [place] -- 4 Eyes      8/15/2023       Skin assessed on: Q Shift      [x] No Pressure Injuries Present    [x]Prevention Measures Documented    [] Yes LDA  for Pressure Injury Previously documented     [] Yes New Pressure Injury Discovered   [] LDA for New Pressure Injury Added      Attending RN:  Michelle Lou RN     Second RN:  CherriRN

## 2023-08-15 NOTE — ASSESSMENT & PLAN NOTE
Unclear cause. US Abdomen no cirrhosis. UA only 1+ protein, doubt nephrotic syndrome. TTE pending.   - will hold on starting lasix today with K<3

## 2023-08-15 NOTE — ASSESSMENT & PLAN NOTE
Suspect associated with alcohol withdrawal. CTH no acute changes. NH3 normal and abdominal US no cirrhosis.   - started IV thiamine, folate, multivitamin  - started valium  - mental status improving on 8/15  - continue to monitor

## 2023-08-15 NOTE — PROGRESS NOTES
[facility] Medicine  Progress Note    Patient Name: Sony Smith  MRN: [id number]  Patient Class: IP- Inpatient   Admission Date: 8/14/2023  Length of Stay: 1 days  Attending Physician: Elba Vincent MD  Primary Care Provider: Meghana, Primary Doctor        Subjective:     Principal Problem:Acute metabolic encephalopathy        HPI:  This is a 37 year old male with PMHx of alcohol abuse,who presents to the ED via EMS for chief complaint of Altered Mental Status onset this morning. Patient reports travelling for work and that he has been under stress and his symptoms began in [facility]. Additional history obtained by an independent historian: EMS personnel, notes that when EMS arrived patient was sitting on a lawn chair on the porch incoherent; patient's roommate stated that the patient is a recovering alcoholic and had 1 alcoholic beverage this morning as well as usually being coherent and able to ambulate. Patient was also noted to repeat himself and when asked when his symptoms started he stated they began in July. EMS further noted unsteady gait, and that the patient complained of bilateral forearm and lower extremity pain with edema.  Patient further denies cough, shortness of breath, chest pain, fever, chills, abdominal pain, nausea, vomiting, diarrhea, dysuria, headaches, congestion, sore throat, eye pain, ear pain, rash, or other associated symptoms. Patient denies recreational drug use;patient has elevated lactic acid 10.3,improved with bolus of IVF,he has hypokalemia, 2.9,replaced,CT head show no acute process,patient has lege edema,US legs show no DVT.he has proteinuria on UA.patient is admitted for inpatient status.      Overview/Hospital Course:  Mr Sony Smith was admitted with acute encephalopathy and lower extremity edema. Suspect associated with alcohol use. Started IV thiamine, folate, multivitamin, valium. Mental status improving. Abdominal US no cirrhosis or ascites. TTE  pending. UA only 1+ protein, doubt nephrotic syndrome.       Interval History: Feeling better today. Still some shakes. Lower extremity edema present.     Review of Systems   Constitutional:  Negative for chills and fever.   Respiratory:  Negative for shortness of breath.    Cardiovascular:  Positive for leg swelling. Negative for chest pain.   Gastrointestinal:  Negative for abdominal distention, abdominal pain, nausea and vomiting.   Genitourinary:  Negative for difficulty urinating.   Musculoskeletal:  Negative for arthralgias and myalgias.   Neurological:  Positive for tremors. Negative for weakness and numbness.   Psychiatric/Behavioral:  Positive for confusion.      Objective:     Vital Signs (Most Recent):  Temp: 98.2 °F (36.8 °C) (08/15/23 0717)  Pulse: 94 (08/15/23 0717)  Resp: 18 (08/15/23 0717)  BP: 115/72 (08/15/23 0717)  SpO2: 98 % (08/15/23 0717) Vital Signs (24h Range):  Temp:  [97.9 °F (36.6 °C)-98.7 °F (37.1 °C)] 98.2 °F (36.8 °C)  Pulse:  [] 94  Resp:  [14-28] 18  SpO2:  [98 %-100 %] 98 %  BP: ()/(52-76) 115/72     Weight: 66.7 kg (147 lb 0.8 oz)  Body mass index is 21.72 kg/m².    Intake/Output Summary (Last 24 hours) at 8/15/2023 1050  Last data filed at 8/15/2023 0949  Gross per 24 hour   Intake 1679.8 ml   Output 0 ml   Net 1679.8 ml         Physical Exam  Vitals and nursing note reviewed.   Constitutional:       General: He is not in acute distress.     Appearance: He is not toxic-appearing.   HENT:      Head: Normocephalic and atraumatic.      Nose: Nose normal.      Mouth/Throat:      Mouth: Mucous membranes are moist.   Cardiovascular:      Rate and Rhythm: Normal rate and regular rhythm.   Pulmonary:      Effort: Pulmonary effort is normal.      Breath sounds: Normal breath sounds.      Comments: Room air  Abdominal:      General: Bowel sounds are normal. There is no distension.      Palpations: Abdomen is soft.      Tenderness: There is no abdominal tenderness.    Musculoskeletal:      Right lower leg: Edema present.      Left lower leg: Edema present.   Skin:     General: Skin is warm and dry.   Neurological:      Mental Status: He is alert and oriented to person, place, and time.      Comments: tremors             Significant Labs: All pertinent labs within the past 24 hours have been reviewed.    Significant Imaging: I have reviewed all pertinent imaging results/findings within the past 24 hours.      Assessment/Plan:      * Acute metabolic encephalopathy  Suspect associated with alcohol withdrawal. CTH no acute changes. NH3 normal and abdominal US no cirrhosis.   - started IV thiamine, folate, multivitamin  - started valium  - mental status improving on 8/15  - continue to monitor    Folate deficiency  Supplement       Thrombocytopenia  Plts 113, no signs of bleeding. Most likely associated with alcohol abuse      Macrocytic anemia  Associated with alcohol abuse and folate deficiency  - supplement folate       Transaminitis  Due to alcohol abuse. No cirrhosis on US.       Alcohol abuse  Started on withdrawal precautions with valium. Monitor CIWA scores.       Lower extremity edema  Unclear cause. US Abdomen no cirrhosis. UA only 1+ protein, doubt nephrotic syndrome. TTE pending.   - will hold on starting lasix today with K<3      Hypokalemia  Replace and monitor          VTE Risk Mitigation (From admission, onward)         Ordered     heparin (porcine) injection 5,000 Units  Every 8 hours         08/14/23 1449                Discharge Planning   ROMAIN:      Code Status: Full Code   Is the patient medically ready for discharge?:     Reason for patient still in hospital (select all that apply): Patient trending condition                     Elba Vincent MD  Department of Hospital Medicine   [facility]

## 2023-08-15 NOTE — SUBJECTIVE & OBJECTIVE
Interval History: Feeling better today. Still some shakes. Lower extremity edema present.     Review of Systems   Constitutional:  Negative for chills and fever.   Respiratory:  Negative for shortness of breath.    Cardiovascular:  Positive for leg swelling. Negative for chest pain.   Gastrointestinal:  Negative for abdominal distention, abdominal pain, nausea and vomiting.   Genitourinary:  Negative for difficulty urinating.   Musculoskeletal:  Negative for arthralgias and myalgias.   Neurological:  Positive for tremors. Negative for weakness and numbness.   Psychiatric/Behavioral:  Positive for confusion.      Objective:     Vital Signs (Most Recent):  Temp: 98.2 °F (36.8 °C) (08/15/23 0717)  Pulse: 94 (08/15/23 0717)  Resp: 18 (08/15/23 0717)  BP: 115/72 (08/15/23 0717)  SpO2: 98 % (08/15/23 0717) Vital Signs (24h Range):  Temp:  [97.9 °F (36.6 °C)-98.7 °F (37.1 °C)] 98.2 °F (36.8 °C)  Pulse:  [] 94  Resp:  [14-28] 18  SpO2:  [98 %-100 %] 98 %  BP: ()/(52-76) 115/72     Weight: 66.7 kg (147 lb 0.8 oz)  Body mass index is 21.72 kg/m².    Intake/Output Summary (Last 24 hours) at 8/15/2023 1050  Last data filed at 8/15/2023 0949  Gross per 24 hour   Intake 1679.8 ml   Output 0 ml   Net 1679.8 ml         Physical Exam  Vitals and nursing note reviewed.   Constitutional:       General: He is not in acute distress.     Appearance: He is not toxic-appearing.   HENT:      Head: Normocephalic and atraumatic.      Nose: Nose normal.      Mouth/Throat:      Mouth: Mucous membranes are moist.   Cardiovascular:      Rate and Rhythm: Normal rate and regular rhythm.   Pulmonary:      Effort: Pulmonary effort is normal.      Breath sounds: Normal breath sounds.      Comments: Room air  Abdominal:      General: Bowel sounds are normal. There is no distension.      Palpations: Abdomen is soft.      Tenderness: There is no abdominal tenderness.   Musculoskeletal:      Right lower leg: Edema present.      Left lower leg:  Edema present.   Skin:     General: Skin is warm and dry.   Neurological:      Mental Status: He is alert and oriented to person, place, and time.      Comments: tremors             Significant Labs: All pertinent labs within the past 24 hours have been reviewed.    Significant Imaging: I have reviewed all pertinent imaging results/findings within the past 24 hours.

## 2023-08-15 NOTE — HOSPITAL COURSE
Mr Sony Smith was admitted with acute encephalopathy and lower extremity edema. Suspect associated with alcohol use. Started IV thiamine (Wernicke treatment protocol), folate, multivitamin, valium. Regarding lower extremity edema, suspect associated with alcoholic hepatitis. Abdominal US no cirrhosis or ascites. TTE no CHF. Given lasix with improvement. PT, OT consulted. Mental status improved but then worsened on 8/16 with worsening withdrawal symptoms. Increased valium dosing to 20mg Q6h. Withdrawal symptoms improved. Weaning valium slowly. Confusion improved but still weak. PT/OT rec rehab placement. Pt was discharged to rehab in stable condition. He will finish his valium taper at rehab.

## 2023-08-15 NOTE — PLAN OF CARE
Case Management Assessment     PCP: [facility]  Pharmacy: [facility] (unable to disclose location)    Patient Arrived From: Home  Existing Help at Home: Friend    Barriers to Discharge: Patient is not able to provide insurance information.     Discharge Plan:    A. Home   B. Home     08/15/23 1147   Discharge Assessment   Assessment Type Discharge Planning Assessment   Confirmed/corrected address, phone number and insurance Yes   Confirmed Demographics Correct on Facesheet   Source of Information patient   Communicated ROMAIN with patient/caregiver Date not available/Unable to determine   Reason For Admission Acute Metabolic Encephalopathy   People in Home alone   Do you expect to return to your current living situation? Yes   Do you have help at home or someone to help you manage your care at home? Yes   Who are your caregiver(s) and their phone number(s)? Yessi Garcia (could not proide phone number)   Prior to hospitilization cognitive status: Alert/Oriented   Current cognitive status: Alert/Oriented   Equipment Currently Used at Home crutches   Readmission within 30 days? No   Patient currently being followed by outpatient case management? No   Do you currently have service(s) that help you manage your care at home? No   Do you take prescription medications? Yes   Do you have prescription coverage? Yes  (Patient stated he has insurance but does not have the card with him.)   Do you have any problems affording any of your prescribed medications? No   Is the patient taking medications as prescribed? yes   Who is going to help you get home at discharge? Yessi Garcia   How do you get to doctors appointments? car, drives self   Are you on dialysis? No   Do you take coumadin? Yes   Who monitors your labs? [facility]?? Patient could not spell it.   DME Needed Upon Discharge  none   Discharge Plan discussed with: Patient   Transition of Care Barriers None   Discharge Plan A Home   Discharge  Plan B Home

## 2023-08-16 PROBLEM — R53.81 DEBILITY: Status: ACTIVE | Noted: 2023-08-16

## 2023-08-16 PROBLEM — K70.10 ALCOHOLIC HEPATITIS WITHOUT ASCITES: Status: ACTIVE | Noted: 2023-08-14

## 2023-08-16 PROBLEM — F10.931 ALCOHOL WITHDRAWAL SYNDROME, WITH DELIRIUM: Status: ACTIVE | Noted: 2023-08-14

## 2023-08-16 LAB
ALBUMIN SERPL BCP-MCNC: 2.8 G/DL (ref 3.5–5.2)
ALP SERPL-CCNC: 119 U/L (ref 55–135)
ALT SERPL W/O P-5'-P-CCNC: 64 U/L (ref 10–44)
ANION GAP SERPL CALC-SCNC: 10 MMOL/L (ref 8–16)
AST SERPL-CCNC: 114 U/L (ref 10–40)
BILIRUB SERPL-MCNC: 0.9 MG/DL (ref 0.1–1)
BUN SERPL-MCNC: 11 MG/DL (ref 6–20)
CALCIUM SERPL-MCNC: 7.9 MG/DL (ref 8.7–10.5)
CHLORIDE SERPL-SCNC: 99 MMOL/L (ref 95–110)
CO2 SERPL-SCNC: 27 MMOL/L (ref 23–29)
CREAT SERPL-MCNC: 0.6 MG/DL (ref 0.5–1.4)
EST. GFR  (NO RACE VARIABLE): >60 ML/MIN/1.73 M^2
GLUCOSE SERPL-MCNC: 88 MG/DL (ref 70–110)
POTASSIUM SERPL-SCNC: 3.5 MMOL/L (ref 3.5–5.1)
PROT SERPL-MCNC: 5.4 G/DL (ref 6–8.4)
RPR SER QL: NORMAL
SODIUM SERPL-SCNC: 136 MMOL/L (ref 136–145)

## 2023-08-16 PROCEDURE — 11000001 HC ACUTE MED/SURG PRIVATE ROOM

## 2023-08-16 PROCEDURE — 25000003 PHARM REV CODE 250: Performed by: HOSPITALIST

## 2023-08-16 PROCEDURE — 97535 SELF CARE MNGMENT TRAINING: CPT

## 2023-08-16 PROCEDURE — 36415 COLL VENOUS BLD VENIPUNCTURE: CPT | Performed by: HOSPITALIST

## 2023-08-16 PROCEDURE — 97530 THERAPEUTIC ACTIVITIES: CPT

## 2023-08-16 PROCEDURE — 63600175 PHARM REV CODE 636 W HCPCS: Performed by: HOSPITALIST

## 2023-08-16 PROCEDURE — 80053 COMPREHEN METABOLIC PANEL: CPT | Performed by: HOSPITALIST

## 2023-08-16 PROCEDURE — S4991 NICOTINE PATCH NONLEGEND: HCPCS | Performed by: PHYSICIAN ASSISTANT

## 2023-08-16 PROCEDURE — 97110 THERAPEUTIC EXERCISES: CPT

## 2023-08-16 PROCEDURE — 97542 WHEELCHAIR MNGMENT TRAINING: CPT

## 2023-08-16 PROCEDURE — 25000003 PHARM REV CODE 250: Performed by: PHYSICIAN ASSISTANT

## 2023-08-16 RX ORDER — DIAZEPAM 5 MG/1
15 TABLET ORAL EVERY 6 HOURS
Status: DISCONTINUED | OUTPATIENT
Start: 2023-08-16 | End: 2023-08-17

## 2023-08-16 RX ORDER — FUROSEMIDE 10 MG/ML
20 INJECTION INTRAMUSCULAR; INTRAVENOUS ONCE
Status: COMPLETED | OUTPATIENT
Start: 2023-08-16 | End: 2023-08-16

## 2023-08-16 RX ORDER — POTASSIUM CHLORIDE 20 MEQ/1
40 TABLET, EXTENDED RELEASE ORAL ONCE
Status: COMPLETED | OUTPATIENT
Start: 2023-08-16 | End: 2023-08-16

## 2023-08-16 RX ORDER — LORAZEPAM 2 MG/ML
2 INJECTION INTRAMUSCULAR
Status: DISCONTINUED | OUTPATIENT
Start: 2023-08-16 | End: 2023-08-28 | Stop reason: HOSPADM

## 2023-08-16 RX ORDER — DIAZEPAM 5 MG/1
10 TABLET ORAL EVERY 6 HOURS
Status: DISCONTINUED | OUTPATIENT
Start: 2023-08-16 | End: 2023-08-16

## 2023-08-16 RX ORDER — DIAZEPAM 5 MG/1
5 TABLET ORAL ONCE
Status: COMPLETED | OUTPATIENT
Start: 2023-08-16 | End: 2023-08-16

## 2023-08-16 RX ADMIN — DIAZEPAM 10 MG: 5 TABLET ORAL at 12:08

## 2023-08-16 RX ADMIN — THERA TABS 1 TABLET: TAB at 09:08

## 2023-08-16 RX ADMIN — DIAZEPAM 5 MG: 5 TABLET ORAL at 05:08

## 2023-08-16 RX ADMIN — HEPARIN SODIUM 5000 UNITS: 5000 INJECTION INTRAVENOUS; SUBCUTANEOUS at 02:08

## 2023-08-16 RX ADMIN — LORAZEPAM 2 MG: 2 INJECTION INTRAMUSCULAR; INTRAVENOUS at 06:08

## 2023-08-16 RX ADMIN — ACETAMINOPHEN 650 MG: 325 TABLET ORAL at 05:08

## 2023-08-16 RX ADMIN — THIAMINE HYDROCHLORIDE 500 MG: 100 INJECTION, SOLUTION INTRAMUSCULAR; INTRAVENOUS at 09:08

## 2023-08-16 RX ADMIN — POTASSIUM CHLORIDE 40 MEQ: 1500 TABLET, EXTENDED RELEASE ORAL at 09:08

## 2023-08-16 RX ADMIN — DIAZEPAM 5 MG: 5 TABLET ORAL at 09:08

## 2023-08-16 RX ADMIN — FUROSEMIDE 20 MG: 10 INJECTION, SOLUTION INTRAMUSCULAR; INTRAVENOUS at 09:08

## 2023-08-16 RX ADMIN — THIAMINE HYDROCHLORIDE 500 MG: 100 INJECTION, SOLUTION INTRAMUSCULAR; INTRAVENOUS at 02:08

## 2023-08-16 RX ADMIN — LORAZEPAM 2 MG: 2 INJECTION INTRAMUSCULAR; INTRAVENOUS at 03:08

## 2023-08-16 RX ADMIN — Medication 1 PATCH: at 09:08

## 2023-08-16 RX ADMIN — DIAZEPAM 15 MG: 5 TABLET ORAL at 05:08

## 2023-08-16 RX ADMIN — HEPARIN SODIUM 5000 UNITS: 5000 INJECTION INTRAVENOUS; SUBCUTANEOUS at 09:08

## 2023-08-16 RX ADMIN — HEPARIN SODIUM 5000 UNITS: 5000 INJECTION INTRAVENOUS; SUBCUTANEOUS at 05:08

## 2023-08-16 RX ADMIN — DIAZEPAM 15 MG: 5 TABLET ORAL at 11:08

## 2023-08-16 RX ADMIN — FOLIC ACID 1 MG: 1 TABLET ORAL at 09:08

## 2023-08-16 NOTE — NURSING
[facility]  Nurses Note -- 4 Eyes      8/15/2023       Skin assessed on: Q Shift      [x] No Pressure Injuries Present    []Prevention Measures Documented    [] Yes LDA  for Pressure Injury Previously documented     [] Yes New Pressure Injury Discovered   [] LDA for New Pressure Injury Added      Attending RN:  Cherri Foster RN     Second RN:  Michelle Lou RN
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
## 2023-08-16 NOTE — SUBJECTIVE & OBJECTIVE
Interval History: Confused, more shaky this morning.     Review of Systems   Neurological:  Positive for tremors.   Psychiatric/Behavioral:  Positive for confusion.      Objective:     Vital Signs (Most Recent):  Temp: 97.8 °F (36.6 °C) (08/16/23 0717)  Pulse: 106 (08/16/23 0717)  Resp: 18 (08/16/23 0717)  BP: 114/70 (08/16/23 0717)  SpO2: 96 % (08/16/23 0717) Vital Signs (24h Range):  Temp:  [97.8 °F (36.6 °C)-98.9 °F (37.2 °C)] 97.8 °F (36.6 °C)  Pulse:  [] 106  Resp:  [18] 18  SpO2:  [96 %-99 %] 96 %  BP: (110-125)/(65-77) 114/70     Weight: 66.7 kg (147 lb)  Body mass index is 21.71 kg/m².    Intake/Output Summary (Last 24 hours) at 8/16/2023 0916  Last data filed at 8/16/2023 0351  Gross per 24 hour   Intake 1214.98 ml   Output 2725 ml   Net -1510.02 ml           Physical Exam  Vitals and nursing note reviewed.   Constitutional:       General: He is not in acute distress.     Appearance: He is not toxic-appearing.   HENT:      Head: Normocephalic and atraumatic.      Nose: Nose normal.      Mouth/Throat:      Mouth: Mucous membranes are moist.   Cardiovascular:      Rate and Rhythm: Normal rate and regular rhythm.   Pulmonary:      Effort: Pulmonary effort is normal.      Breath sounds: Normal breath sounds.      Comments: Room air  Abdominal:      General: Bowel sounds are normal. There is no distension.      Palpations: Abdomen is soft.      Tenderness: There is no abdominal tenderness.   Musculoskeletal:      Right lower leg: Edema present.      Left lower leg: Edema present.   Skin:     General: Skin is warm and dry.   Neurological:      Mental Status: He is alert. He is disoriented.      Comments: Tremors present. Not oriented to location, situation, date             Significant Labs: All pertinent labs within the past 24 hours have been reviewed.    Significant Imaging: I have reviewed all pertinent imaging results/findings within the past 24 hours.

## 2023-08-16 NOTE — PROGRESS NOTES
[facility]  Progress Note    Patient Name: Sony Smith  MRN: [id number]  Patient Class: IP- Inpatient   Admission Date: 8/14/2023  Length of Stay: 2 days  Attending Physician: Elba Vincent MD  Primary Care Provider: Meghana, Primary Doctor        Subjective:     Principal Problem:Alcohol withdrawal syndrome, with delirium        HPI:  This is a 37 year old male with PMHx of alcohol abuse,who presents to the ED via EMS for chief complaint of Altered Mental Status onset this morning. Patient reports travelling for work and that he has been under stress and his symptoms began in [facility]. Additional history obtained by an independent historian: EMS personnel, notes that when EMS arrived patient was sitting on a lawn chair on the porch incoherent; patient's roommate stated that the patient is a recovering alcoholic and had 1 alcoholic beverage this morning as well as usually being coherent and able to ambulate. Patient was also noted to repeat himself and when asked when his symptoms started he stated they began in July. EMS further noted unsteady gait, and that the patient complained of bilateral forearm and lower extremity pain with edema.  Patient further denies cough, shortness of breath, chest pain, fever, chills, abdominal pain, nausea, vomiting, diarrhea, dysuria, headaches, congestion, sore throat, eye pain, ear pain, rash, or other associated symptoms. Patient denies recreational drug use;patient has elevated lactic acid 10.3,improved with bolus of IVF,he has hypokalemia, 2.9,replaced,CT head show no acute process,patient has lege edema,US legs show no DVT.he has proteinuria on UA.patient is admitted for inpatient status.      Overview/Hospital Course:  Mr Sony Smith was admitted with acute encephalopathy and lower extremity edema. Suspect associated with alcohol use. Started IV thiamine, folate, multivitamin, valium. Mental status improved but then worsened on 8/16.  Increased valium dosing. Regarding lower extremity edema, suspect associated with alcoholic hepatitis. Abdominal US no cirrhosis or ascites. TTE no CHF. Given lasix with improvement. PT, OT consulted.      Interval History: Confused, more shaky this morning.     Review of Systems   Neurological:  Positive for tremors.   Psychiatric/Behavioral:  Positive for confusion.      Objective:     Vital Signs (Most Recent):  Temp: 97.8 °F (36.6 °C) (08/16/23 0717)  Pulse: 106 (08/16/23 0717)  Resp: 18 (08/16/23 0717)  BP: 114/70 (08/16/23 0717)  SpO2: 96 % (08/16/23 0717) Vital Signs (24h Range):  Temp:  [97.8 °F (36.6 °C)-98.9 °F (37.2 °C)] 97.8 °F (36.6 °C)  Pulse:  [] 106  Resp:  [18] 18  SpO2:  [96 %-99 %] 96 %  BP: (110-125)/(65-77) 114/70     Weight: 66.7 kg (147 lb)  Body mass index is 21.71 kg/m².    Intake/Output Summary (Last 24 hours) at 8/16/2023 0916  Last data filed at 8/16/2023 0351  Gross per 24 hour   Intake 1214.98 ml   Output 2725 ml   Net -1510.02 ml           Physical Exam  Vitals and nursing note reviewed.   Constitutional:       General: He is not in acute distress.     Appearance: He is not toxic-appearing.   HENT:      Head: Normocephalic and atraumatic.      Nose: Nose normal.      Mouth/Throat:      Mouth: Mucous membranes are moist.   Cardiovascular:      Rate and Rhythm: Normal rate and regular rhythm.   Pulmonary:      Effort: Pulmonary effort is normal.      Breath sounds: Normal breath sounds.      Comments: Room air  Abdominal:      General: Bowel sounds are normal. There is no distension.      Palpations: Abdomen is soft.      Tenderness: There is no abdominal tenderness.   Musculoskeletal:      Right lower leg: Edema present.      Left lower leg: Edema present.   Skin:     General: Skin is warm and dry.   Neurological:      Mental Status: He is alert. He is disoriented.      Comments: Tremors present. Not oriented to location, situation, date             Significant Labs: All pertinent  labs within the past 24 hours have been reviewed.    Significant Imaging: I have reviewed all pertinent imaging results/findings within the past 24 hours.      Assessment/Plan:      * Alcohol withdrawal syndrome, with delirium  Suspect associated with alcohol withdrawal. CTH no acute changes. NH3 normal and abdominal US no cirrhosis.   - started IV thiamine, folate, multivitamin  - started valium  - worsening mental status on 8/16 consistent with worsening withdrawals- increase valium  - continue to monitor with CIWA q4    Debility  Very weak when working with PT, OT  - continue PT, OT  - unclear disposition on discharge       Folate deficiency  Supplement       Thrombocytopenia  Plts 113, no signs of bleeding. Most likely associated with alcohol abuse      Macrocytic anemia  Associated with alcohol abuse and folate deficiency  - supplement folate       Alcoholic hepatitis without ascites  No signs of cirrhosis. Not consistent with acute liver failure. Treating alcohol withdrawal.       Alcohol abuse  See above      Lower extremity edema  Suspect associated with alcoholic hepatitis. US Abdomen no cirrhosis. UA only 1+ protein, doubt nephrotic syndrome. TTE no CHF.   - lasix again today     Hypokalemia  Replace and monitor          VTE Risk Mitigation (From admission, onward)           Ordered     heparin (porcine) injection 5,000 Units  Every 8 hours         08/14/23 1449                    Discharge Planning   ROMAIN: 8/17/2023     Code Status: Full Code   Is the patient medically ready for discharge?:     Reason for patient still in hospital (select all that apply): Patient trending condition  Discharge Plan A: Home          11:05 AM Called sister Florencia to update her. She lives in [place]. He has a long history of alcohol abuse. She says he also has a history of thyroid cancer and depression. He has attempted suicide x2 in the past. He is not , has no children. Father is living. He has 3 siblings.          Elba Vincent MD  Department of Hospital Medicine   [facility] [place] [facility]
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
## 2023-08-16 NOTE — PLAN OF CARE
"Patient is awake alert and oriented to self, answers simple questions appropriately. Needs constant,reorienting, redirecting  and reinforcement to time, location, situation and limitations. Patient was more anxious this shift verus yesterday needing reassurance; screamed on a couple occasions in his sleep saying he thinks he had "heroine and not just alcohol".  He says he wants to go to "[place], [place]" to be with his sister.   Free of falls, telesitter and bed alarm on. Continue with plan of care as ordered.     Problem: Adult Inpatient Plan of Care  Goal: Plan of Care Review  Outcome: Ongoing, Progressing  Goal: Patient-Specific Goal (Individualized)  Outcome: Ongoing, Progressing  Goal: Optimal Comfort and Wellbeing  Outcome: Ongoing, Progressing  Goal: Readiness for Transition of Care  Outcome: Ongoing, Progressing     Problem: Fall Injury Risk  Goal: Absence of Fall and Fall-Related Injury  Outcome: Ongoing, Progressing     "

## 2023-08-16 NOTE — ASSESSMENT & PLAN NOTE
Suspect associated with alcoholic hepatitis. US Abdomen no cirrhosis. UA only 1+ protein, doubt nephrotic syndrome. TTE no CHF.   - lasix again today

## 2023-08-16 NOTE — ASSESSMENT & PLAN NOTE
Suspect associated with alcohol withdrawal. CTH no acute changes. NH3 normal and abdominal US no cirrhosis.   - started IV thiamine, folate, multivitamin  - started valium  - worsening mental status on 8/16 consistent with worsening withdrawals- increase valium  - continue to monitor with CIWA q4

## 2023-08-16 NOTE — PLAN OF CARE
Problem: Occupational Therapy  Goal: Occupational Therapy Goal  Description: Goals to be met by: 8/29/23      Patient will increase functional independence with ADLs by performing:    UE Dressing with [place].  LE Dressing with [place].  Grooming while standing at sink with [place].  Toileting from toilet with [place] for hygiene and clothing management.   Toilet transfer to toilet with [place].  Increased functional strength to 5/5 for B upper extremity .  Upper extremity exercise program x15-20 reps per handout, with independence.    Outcome: Ongoing, Progressing   Patient had more difficulty with t/f from bed to chair and wheelchair today due to increasing tremors and unsteady gait, so has not t/f to BSC or toilet, yet. He groomed with min assist to use mouthwash needed due to apraxia and tremors of both hands.

## 2023-08-16 NOTE — PLAN OF CARE
Problem: Adult Inpatient Plan of Care  Goal: Plan of Care Review  Outcome: Ongoing, Progressing  Goal: Patient-Specific Goal (Individualized)  Outcome: Ongoing, Progressing  Goal: Absence of Hospital-Acquired Illness or Injury  Outcome: Ongoing, Progressing  Goal: Optimal Comfort and Wellbeing  Outcome: Ongoing, Progressing  Goal: Readiness for Transition of Care  Outcome: Ongoing, Progressing     Problem: Fall Injury Risk  Goal: Absence of Fall and Fall-Related Injury  Outcome: Ongoing, Progressing  Intervention: Identify and Manage Contributors  Flowsheets (Taken 8/16/2023 1227)  Self-Care Promotion:   BADL personal routines maintained   meal set-up provided  Medication Review/Management: medications reviewed  Intervention: Promote Injury-Free Environment  Flowsheets (Taken 8/16/2023 1227)  Safety Promotion/Fall Prevention:   assistive device/personal item within reach   bed alarm set   chair alarm set   commode/urinal/bedpan at bedside   Fall Risk signage in place   Fall Risk reviewed with patient/family   nonskid shoes/socks when out of bed   room near unit station   /camera at bedside   instructed to call staff for mobility     Problem: Alcohol Withdrawal  Goal: Alcohol Withdrawal Symptom Control  Outcome: Ongoing, Progressing  Intervention: Minimize or Manage Alcohol Withdrawal Symptoms  Flowsheets (Taken 8/16/2023 1227)  [place]iration Precautions: awake/alert before oral intake  Seizure Precautions:   activity supervised   clutter-free environment maintained  Sensory Stimulation Regulation: television on     Problem: Acute Neurologic Deterioration (Alcohol Withdrawal)  Goal: Optimal Neurologic Function  Outcome: Ongoing, Progressing  Intervention: Minimize or Manage Acute Neurologic Symptoms  Flowsheets (Taken 8/16/2023 1227)  Sensory Stimulation Regulation: television on  Cerebral Perfusion Promotion: blood pressure monitored  Intervention: Prevent Seizure-Related Injury  Flowsheets (Taken  8/16/2023 1227)  Seizure Precautions:   activity supervised   clutter-free environment maintained     Problem: Substance Misuse (Alcohol Withdrawal)  Goal: Readiness for Change Identified  Outcome: Ongoing, Progressing  Intervention: Promote Psychosocial Wellbeing  Flowsheets (Taken 8/16/2023 1227)  Family/Support System Care: involvement promoted  Intervention: Partner to Facilitate Behavior Change  Flowsheets (Taken 8/16/2023 1227)  Supportive Measures: active listening utilized

## 2023-08-17 PROBLEM — E87.6 HYPOKALEMIA: Status: RESOLVED | Noted: 2023-08-14 | Resolved: 2023-08-17

## 2023-08-17 LAB
ALBUMIN SERPL BCP-MCNC: 2.7 G/DL (ref 3.5–5.2)
ALP SERPL-CCNC: 115 U/L (ref 55–135)
ALT SERPL W/O P-5'-P-CCNC: 89 U/L (ref 10–44)
ANION GAP SERPL CALC-SCNC: 6 MMOL/L (ref 8–16)
AST SERPL-CCNC: 142 U/L (ref 10–40)
BASOPHILS # BLD AUTO: 0.03 K/UL (ref 0–0.2)
BASOPHILS NFR BLD: 0.5 % (ref 0–1.9)
BILIRUB SERPL-MCNC: 1.1 MG/DL (ref 0.1–1)
BUN SERPL-MCNC: 6 MG/DL (ref 6–20)
CALCIUM SERPL-MCNC: 7.8 MG/DL (ref 8.7–10.5)
CHLORIDE SERPL-SCNC: 96 MMOL/L (ref 95–110)
CO2 SERPL-SCNC: 28 MMOL/L (ref 23–29)
CREAT SERPL-MCNC: 0.6 MG/DL (ref 0.5–1.4)
DIFFERENTIAL METHOD: ABNORMAL
EOSINOPHIL # BLD AUTO: 0.1 K/UL (ref 0–0.5)
EOSINOPHIL NFR BLD: 1.2 % (ref 0–8)
ERYTHROCYTE [DISTWIDTH] IN BLOOD BY AUTOMATED COUNT: 15.6 % (ref 11.5–14.5)
EST. GFR  (NO RACE VARIABLE): >60 ML/MIN/1.73 M^2
GLUCOSE SERPL-MCNC: 78 MG/DL (ref 70–110)
HCT VFR BLD AUTO: 24.4 % (ref 40–54)
HGB BLD-MCNC: 8.7 G/DL (ref 14–18)
IMM GRANULOCYTES # BLD AUTO: 0.07 K/UL (ref 0–0.04)
IMM GRANULOCYTES NFR BLD AUTO: 1.2 % (ref 0–0.5)
LYMPHOCYTES # BLD AUTO: 1.9 K/UL (ref 1–4.8)
LYMPHOCYTES NFR BLD: 31.7 % (ref 18–48)
MCH RBC QN AUTO: 44.8 PG (ref 27–31)
MCHC RBC AUTO-ENTMCNC: 35.7 G/DL (ref 32–36)
MCV RBC AUTO: 126 FL (ref 82–98)
MONOCYTES # BLD AUTO: 0.4 K/UL (ref 0.3–1)
MONOCYTES NFR BLD: 7.4 % (ref 4–15)
NEUTROPHILS # BLD AUTO: 3.4 K/UL (ref 1.8–7.7)
NEUTROPHILS NFR BLD: 58 % (ref 38–73)
NRBC BLD-RTO: 0 /100 WBC
PLATELET # BLD AUTO: 90 K/UL (ref 150–450)
PMV BLD AUTO: 9.5 FL (ref 9.2–12.9)
POTASSIUM SERPL-SCNC: 3.8 MMOL/L (ref 3.5–5.1)
PROT SERPL-MCNC: 5.2 G/DL (ref 6–8.4)
RBC # BLD AUTO: 1.94 M/UL (ref 4.6–6.2)
SODIUM SERPL-SCNC: 130 MMOL/L (ref 136–145)
WBC # BLD AUTO: 5.84 K/UL (ref 3.9–12.7)

## 2023-08-17 PROCEDURE — 25000003 PHARM REV CODE 250: Performed by: PHYSICIAN ASSISTANT

## 2023-08-17 PROCEDURE — 63600175 PHARM REV CODE 636 W HCPCS: Performed by: HOSPITALIST

## 2023-08-17 PROCEDURE — 11000001 HC ACUTE MED/SURG PRIVATE ROOM

## 2023-08-17 PROCEDURE — 25000003 PHARM REV CODE 250: Performed by: HOSPITALIST

## 2023-08-17 PROCEDURE — 97530 THERAPEUTIC ACTIVITIES: CPT

## 2023-08-17 PROCEDURE — 85025 COMPLETE CBC W/AUTO DIFF WBC: CPT | Performed by: HOSPITALIST

## 2023-08-17 PROCEDURE — 36415 COLL VENOUS BLD VENIPUNCTURE: CPT | Performed by: HOSPITALIST

## 2023-08-17 PROCEDURE — S4991 NICOTINE PATCH NONLEGEND: HCPCS | Performed by: PHYSICIAN ASSISTANT

## 2023-08-17 PROCEDURE — 97110 THERAPEUTIC EXERCISES: CPT | Mod: CQ

## 2023-08-17 PROCEDURE — 80053 COMPREHEN METABOLIC PANEL: CPT | Performed by: HOSPITALIST

## 2023-08-17 PROCEDURE — 97530 THERAPEUTIC ACTIVITIES: CPT | Mod: CQ

## 2023-08-17 PROCEDURE — 97535 SELF CARE MNGMENT TRAINING: CPT

## 2023-08-17 RX ORDER — DIAZEPAM 5 MG/1
20 TABLET ORAL EVERY 6 HOURS
Status: DISCONTINUED | OUTPATIENT
Start: 2023-08-17 | End: 2023-08-19

## 2023-08-17 RX ADMIN — DIAZEPAM 20 MG: 5 TABLET ORAL at 05:08

## 2023-08-17 RX ADMIN — HEPARIN SODIUM 5000 UNITS: 5000 INJECTION INTRAVENOUS; SUBCUTANEOUS at 09:08

## 2023-08-17 RX ADMIN — THIAMINE HYDROCHLORIDE 500 MG: 100 INJECTION, SOLUTION INTRAMUSCULAR; INTRAVENOUS at 08:08

## 2023-08-17 RX ADMIN — HEPARIN SODIUM 5000 UNITS: 5000 INJECTION INTRAVENOUS; SUBCUTANEOUS at 05:08

## 2023-08-17 RX ADMIN — Medication 1 PATCH: at 08:08

## 2023-08-17 RX ADMIN — DIAZEPAM 15 MG: 5 TABLET ORAL at 05:08

## 2023-08-17 RX ADMIN — HEPARIN SODIUM 5000 UNITS: 5000 INJECTION INTRAVENOUS; SUBCUTANEOUS at 02:08

## 2023-08-17 RX ADMIN — LORAZEPAM 2 MG: 2 INJECTION INTRAMUSCULAR; INTRAVENOUS at 01:08

## 2023-08-17 RX ADMIN — LORAZEPAM 2 MG: 2 INJECTION INTRAMUSCULAR; INTRAVENOUS at 11:08

## 2023-08-17 RX ADMIN — FOLIC ACID 1 MG: 1 TABLET ORAL at 08:08

## 2023-08-17 RX ADMIN — THERA TABS 1 TABLET: TAB at 08:08

## 2023-08-17 RX ADMIN — DIAZEPAM 15 MG: 5 TABLET ORAL at 11:08

## 2023-08-17 NOTE — PLAN OF CARE
Problem: Occupational Therapy  Goal: Occupational Therapy Goal  Description: Goals to be met by: 8/29/23      Patient will increase functional independence with ADLs by performing:    UE Dressing with [place].  LE Dressing with [place].  Grooming while standing at sink with [place].  Toileting from toilet with [place] for hygiene and clothing management.   Toilet transfer to toilet with [place].  Increased functional strength to 5/5 for B upper extremity .  Upper extremity exercise program x15-20 reps per handout, with independence.    Outcome: Ongoing, Progressing    Patient t/f to [facility] max assist x 2 people for t/f from bed and to bed and for hygiene and brief management due to tremors and overall weakness.

## 2023-08-17 NOTE — SUBJECTIVE & OBJECTIVE
Interval History: Confused, tremulous, taking off his gown and picking at his IV    Review of Systems   Neurological:  Positive for tremors.   Psychiatric/Behavioral:  Positive for confusion.      Objective:     Vital Signs (Most Recent):  Temp: 96.8 °F (36 °C) (08/17/23 0740)  Pulse: 95 (08/17/23 0740)  Resp: 20 (08/17/23 0740)  BP: 107/77 (08/17/23 0740)  SpO2: 95 % (08/17/23 0740) Vital Signs (24h Range):  Temp:  [96.8 °F (36 °C)-98.2 °F (36.8 °C)] 96.8 °F (36 °C)  Pulse:  [] 95  Resp:  [18-20] 20  SpO2:  [95 %-98 %] 95 %  BP: (100-120)/(61-77) 107/77     Weight: 66.7 kg (147 lb)  Body mass index is 21.71 kg/m².    Intake/Output Summary (Last 24 hours) at 8/17/2023 1007  Last data filed at 8/17/2023 0700  Gross per 24 hour   Intake 360 ml   Output 450 ml   Net -90 ml           Physical Exam  Vitals and nursing note reviewed.   Constitutional:       General: He is not in acute distress.     Appearance: He is ill-appearing. He is not toxic-appearing.   HENT:      Head: Normocephalic and atraumatic.   Cardiovascular:      Rate and Rhythm: Normal rate and regular rhythm.   Pulmonary:      Effort: Pulmonary effort is normal.      Breath sounds: Normal breath sounds.      Comments: Room air  Abdominal:      General: Bowel sounds are normal. There is no distension.      Palpations: Abdomen is soft.      Tenderness: There is no abdominal tenderness.   Musculoskeletal:      Right lower leg: Edema present.      Left lower leg: Edema present.   Skin:     General: Skin is warm and dry.   Neurological:      Mental Status: He is alert. He is disoriented.      Comments: Tremors present. Not oriented to location, situation, date. Oriented to self             Significant Labs: All pertinent labs within the past 24 hours have been reviewed.    Significant Imaging: I have reviewed all pertinent imaging results/findings within the past 24 hours.

## 2023-08-17 NOTE — PLAN OF CARE
Problem: Adult Inpatient Plan of Care  Goal: Plan of Care Review  Outcome: Ongoing, Progressing  Flowsheets (Taken 8/17/2023 1658)  Plan of Care Reviewed With: patient  Goal: Patient-Specific Goal (Individualized)  Outcome: Ongoing, Progressing  Goal: Absence of Hospital-Acquired Illness or Injury  Outcome: Ongoing, Progressing  Intervention: Identify and Manage Fall Risk  Flowsheets (Taken 8/17/2023 1658)  Safety Promotion/Fall Prevention:   assistive device/personal item within reach   high risk medications identified   Fall Risk reviewed with patient/family   nonskid shoes/socks when out of bed   room near unit station   side rails raised x 2   instructed to call staff for mobility   medications reviewed   bed alarm set  Intervention: Prevent Skin Injury  Flowsheets (Taken 8/17/2023 1658)  Body Position:   position changed independently   weight shifting  Skin Protection:   adhesive use limited   tubing/devices free from skin contact  Intervention: Prevent and Manage VTE (Venous Thromboembolism) Risk  Flowsheets (Taken 8/17/2023 1658)  Activity Management: Rolling - L1  VTE Prevention/Management:   ambulation promoted   bleeding precations maintained  Range of Motion: active ROM (range of motion) encouraged  Intervention: Prevent Infection  Flowsheets (Taken 8/17/2023 1703)  Infection Prevention: hand hygiene promoted  Goal: Optimal Comfort and Wellbeing  Outcome: Ongoing, Progressing  Goal: Readiness for Transition of Care  Outcome: Ongoing, Progressing     Problem: Fall Injury Risk  Goal: Absence of Fall and Fall-Related Injury  Outcome: Ongoing, Progressing  Intervention: Identify and Manage Contributors  Flowsheets (Taken 8/17/2023 1703)  Self-Care Promotion:   independence encouraged   safe use of adaptive equipment encouraged   BADL personal objects within reach   BADL personal routines maintained   meal set-up provided  Medication Review/Management:   high-risk medications identified   medications  reviewed  Intervention: Promote Injury-Free Environment  Flowsheets (Taken 8/17/2023 1703)  Safety Promotion/Fall Prevention:   assistive device/personal item within reach   bed alarm set   high risk medications identified   medications reviewed   nonskid shoes/socks when out of bed   side rails raised x 2   room near unit station   instructed to call staff for mobility     Problem: Skin Injury Risk Increased  Goal: Skin Health and Integrity  Outcome: Ongoing, Progressing  Intervention: Optimize Skin Protection  Flowsheets (Taken 8/17/2023 1703)  Pressure Reduction Techniques:   frequent weight shift encouraged   weight shift assistance provided  Pressure Reduction Devices: foam padding utilized  Skin Protection:   adhesive use limited   tubing/devices free from skin contact  Head of Bed (HOB) Positioning: HOB at 30-45 degrees     Problem: Alcohol Withdrawal  Goal: Alcohol Withdrawal Symptom Control  Outcome: Ongoing, Progressing  Intervention: Minimize or Manage Alcohol Withdrawal Symptoms  Flowsheets (Taken 8/17/2023 1703)  Seizure Precautions: clutter-free environment maintained  Sensory Stimulation Regulation:   quiet environment promoted   television on     Problem: Acute Neurologic Deterioration (Alcohol Withdrawal)  Goal: Optimal Neurologic Function  Outcome: Ongoing, Progressing  Intervention: Minimize or Manage Acute Neurologic Symptoms  Flowsheets (Taken 8/17/2023 1703)  Sensory Stimulation Regulation:   quiet environment promoted   television on  Intervention: Prevent Seizure-Related Injury  Flowsheets (Taken 8/17/2023 1703)  Seizure Precautions: clutter-free environment maintained     Problem: Substance Misuse (Alcohol Withdrawal)  Goal: Readiness for Change Identified  Outcome: Ongoing, Progressing   Pt alert able to make needs known,nahomy meds well,IV Vitamin B1 remains in progress,no s/s adverse reaction noted,reposition q 2hrs,no s/s  pain noted,no seizure activity noted,POC explained,remains free from  falls and pressure injuries,safety maintained,continue monitoring.

## 2023-08-17 NOTE — PT/OT/SLP PROGRESS
"Occupational Therapy   Treatment    Name: Sony Smith  MRN: [id number]  Admitting Diagnosis:  Alcohol withdrawal syndrome, with delirium       Recommendations:     Discharge Recommendations: rehabilitation facility  Discharge Equipment Recommendations:  shower chair  Barriers to discharge:  Other (Comment) (patient has 15 steps and is needing moderate assistance to take steps with RW with poor balance and sensation of B feet limiting ambulation and t/f ; pt. at high risk for readmission for falls     Taken)    Assessment:     Sony Smith is a 37 y.o. male with a medical diagnosis of Alcohol withdrawal syndrome, with delirium.  He presents with HOB elevated and no IV connected with bed alarm. He did not c/o knee pain today. Performance deficits affecting function are weakness, impaired endurance, impaired sensation, impaired self care skills, impaired functional mobility, gait instability, impaired balance, decreased coordination, impaired cognition, decreased upper extremity function, decreased lower extremity function, decreased safety awareness, decreased ROM, impaired coordination, edema.     Rehab Prognosis:  Good; patient would benefit from acute skilled OT services to address these deficits and reach maximum level of function.       Plan:     Patient to be seen 5 x/week to address the above listed problems via self-care/home management, neuromuscular re-education, therapeutic activities, therapeutic exercises  Plan of Care Expires: 08/29/23  Plan of Care Reviewed with: patient    Subjective     Chief Complaint: weakness   Patient/Family Comments/goals: "I flew to [place] and [place] last night."   Pain/Comfort:  Pain Rating 1: 0/10    Objective:     Communicated with: NAV Acosta, prior to session.  Patient found HOB elevated with peripheral IV, telemetry, bed alarm, Condom Catheter upon OT entry to room.    General Precautions: Standard, DT, fall    Orthopedic Precautions:N/A  Braces: " N/A  Respiratory Status: Room air     Occupational Performance:     Bed Mobility:    Patient completed Rolling/Turning to Left with  moderate assistance  Patient completed Scooting/Bridging with min assistance  Patient completed Supine to Sit with moderate assistance     Functional Mobility/Transfers:  Patient completed Sit <> Stand Transfer with maximal assistance and of 2  persons  with RW   Patient completed Bed <> Chair Transfer using Squat Pivot technique with maximal assistance and of 2  persons with no assistive device  Patient completed Toilet Transfer squat pivot technique with maximal assistance and of 2  persons with  no AD  Functional Mobility: Patient attempted to take steps with RW with max assist x 2     Activities of Daily Living:  Feeding:  minimum assistance to add foam handle utensil and to help take one scoop with whole hand   Grooming: stand by assistance to comb hair while seated in chair   Upper Body Dressing: minimum assistance to don outer gown   Lower Body Dressing: total assistance to don and doff brief   Toileting: total assistance and of 2 persons  to don and doff brief and for hygiene.       [facility] 6 Click ADL: 15    Treatment & Education:  Patient educated re: [facility] t/f safety to and from [facility] from bed  Occupational therapy educated him and demonstrated how to use red foam handle for utensils for self feeding due to tremors       Patient left up in chair with all lines intact, call button in reach, chair alarm on, and RN notified    GOALS:   Multidisciplinary Problems       Occupational Therapy Goals          Problem: Occupational Therapy    Goal Priority Disciplines Outcome Interventions   Occupational Therapy Goal     OT, PT/OT Ongoing, Progressing    Description: Goals to be met by: 8/29/23      Patient will increase functional independence with ADLs by performing:    UE Dressing with [place].  LE Dressing with [place].  Grooming while standing at sink with  [place].  Toileting from toilet with [place] for hygiene and clothing management.   Toilet transfer to toilet with [place].  Increased functional strength to 5/5 for B upper extremity .  Upper extremity exercise program x15-20 reps per handout, with independence.                         Time Tracking:     OT Date of Treatment: 08/17/23  OT Start Time: 1157  OT Stop Time: 1236  OT Total Time (min): 39 min    Billable Minutes:Self Care/Home Management 24   Therapeutic Activity 15    OT/JOHANNY: OT        Co-treat with PTA     8/17/2023

## 2023-08-17 NOTE — PROGRESS NOTES
[facility] Medicine  Progress Note    Patient Name: Sony Smith  MRN: [id number]  Patient Class: IP- Inpatient   Admission Date: 8/14/2023  Length of Stay: 3 days  Attending Physician: Elba Vincent MD  Primary Care Provider: Meghana, Primary Doctor        Subjective:     Principal Problem:Alcohol withdrawal syndrome, with delirium        HPI:  This is a 37 year old male with PMHx of alcohol abuse,who presents to the ED via EMS for chief complaint of Altered Mental Status onset this morning. Patient reports travelling for work and that he has been under stress and his symptoms began in [facility]. Additional history obtained by an independent historian: EMS personnel, notes that when EMS arrived patient was sitting on a lawn chair on the porch incoherent; patient's roommate stated that the patient is a recovering alcoholic and had 1 alcoholic beverage this morning as well as usually being coherent and able to ambulate. Patient was also noted to repeat himself and when asked when his symptoms started he stated they began in July. EMS further noted unsteady gait, and that the patient complained of bilateral forearm and lower extremity pain with edema.  Patient further denies cough, shortness of breath, chest pain, fever, chills, abdominal pain, nausea, vomiting, diarrhea, dysuria, headaches, congestion, sore throat, eye pain, ear pain, rash, or other associated symptoms. Patient denies recreational drug use;patient has elevated lactic acid 10.3,improved with bolus of IVF,he has hypokalemia, 2.9,replaced,CT head show no acute process,patient has lege edema,US legs show no DVT.he has proteinuria on UA.patient is admitted for inpatient status.      Overview/Hospital Course:  Mr Sony Smith was admitted with acute encephalopathy and lower extremity edema. Suspect associated with alcohol use. Started IV thiamine, folate, multivitamin, valium. Regarding lower extremity edema, suspect associated  with alcoholic hepatitis. Abdominal US no cirrhosis or ascites. TTE no CHF. Given lasix with improvement. PT, OT consulted. Mental status improved but then worsened on 8/16 with worsening withdrawal symptoms. Increased valium dosing.       Interval History: Confused, tremulous, taking off his gown and picking at his IV    Review of Systems   Neurological:  Positive for tremors.   Psychiatric/Behavioral:  Positive for confusion.      Objective:     Vital Signs (Most Recent):  Temp: 96.8 °F (36 °C) (08/17/23 0740)  Pulse: 95 (08/17/23 0740)  Resp: 20 (08/17/23 0740)  BP: 107/77 (08/17/23 0740)  SpO2: 95 % (08/17/23 0740) Vital Signs (24h Range):  Temp:  [96.8 °F (36 °C)-98.2 °F (36.8 °C)] 96.8 °F (36 °C)  Pulse:  [] 95  Resp:  [18-20] 20  SpO2:  [95 %-98 %] 95 %  BP: (100-120)/(61-77) 107/77     Weight: 66.7 kg (147 lb)  Body mass index is 21.71 kg/m².    Intake/Output Summary (Last 24 hours) at 8/17/2023 1007  Last data filed at 8/17/2023 0700  Gross per 24 hour   Intake 360 ml   Output 450 ml   Net -90 ml           Physical Exam  Vitals and nursing note reviewed.   Constitutional:       General: He is not in acute distress.     Appearance: He is ill-appearing. He is not toxic-appearing.   HENT:      Head: Normocephalic and atraumatic.   Cardiovascular:      Rate and Rhythm: Normal rate and regular rhythm.   Pulmonary:      Effort: Pulmonary effort is normal.      Breath sounds: Normal breath sounds.      Comments: Room air  Abdominal:      General: Bowel sounds are normal. There is no distension.      Palpations: Abdomen is soft.      Tenderness: There is no abdominal tenderness.   Musculoskeletal:      Right lower leg: Edema present.      Left lower leg: Edema present.   Skin:     General: Skin is warm and dry.   Neurological:      Mental Status: He is alert. He is disoriented.      Comments: Tremors present. Not oriented to location, situation, date. Oriented to self             Significant Labs: All  pertinent labs within the past 24 hours have been reviewed.    Significant Imaging: I have reviewed all pertinent imaging results/findings within the past 24 hours.      Assessment/Plan:      * Alcohol withdrawal syndrome, with delirium  Suspect associated with alcohol withdrawal. CTH no acute changes. NH3 normal and abdominal US no cirrhosis.   - started IV thiamine, folate, multivitamin  - started valium  - worsening mental status on 8/16 consistent with worsening withdrawals- valium now at 15mg Q6 and IV ativan available   - continue to monitor with CIWA q4    Debility  Very weak when working with PT, OT  - continue PT, OT  - unclear disposition on discharge       Folate deficiency  Supplement       Thrombocytopenia  Plts 113 on admit, no signs of bleeding. Most likely associated with alcohol abuse      Macrocytic anemia  Associated with alcohol abuse and folate deficiency  - supplement folate       Alcoholic hepatitis without ascites  No signs of cirrhosis. Not consistent with acute liver failure. Treating alcohol withdrawal.       Alcohol abuse  See above      Lower extremity edema  Suspect associated with alcoholic hepatitis. US Abdomen no cirrhosis. UA only 1+ protein, doubt nephrotic syndrome. TTE no CHF.   -resolved with lasix      VTE Risk Mitigation (From admission, onward)           Ordered     heparin (porcine) injection 5,000 Units  Every 8 hours         08/14/23 1449                    Discharge Planning   ROMAIN: 8/21/2023     Code Status: Full Code   Is the patient medically ready for discharge?:     Reason for patient still in hospital (select all that apply): Patient trending condition  Discharge Plan A: Home          12:35 PM Called sister Florencia to update her. All questions answered.         Elba Vincent MD  Department of Hospital Medicine   [facility]

## 2023-08-17 NOTE — ASSESSMENT & PLAN NOTE
Suspect associated with alcoholic hepatitis. US Abdomen no cirrhosis. UA only 1+ protein, doubt nephrotic syndrome. TTE no CHF.   -resolved with lasix

## 2023-08-17 NOTE — ASSESSMENT & PLAN NOTE
Suspect associated with alcohol withdrawal. CTH no acute changes. NH3 normal and abdominal US no cirrhosis.   - started IV thiamine, folate, multivitamin  - started valium  - worsening mental status on 8/16 consistent with worsening withdrawals- valium now at 15mg Q6 and IV ativan available   - continue to monitor with CIWA q4

## 2023-08-17 NOTE — PLAN OF CARE
Patient continues to need reassurance, reorienting and redirecting and guidance with activities. At times you hear him talking to himself out loud in his room. Free of falls, telesitter in room. Continue plan of care as ordered.   Problem: Adult Inpatient Plan of Care  Goal: Plan of Care Review  Outcome: Ongoing, Progressing  Goal: Patient-Specific Goal (Individualized)  Outcome: Ongoing, Progressing  Goal: Optimal Comfort and Wellbeing  Outcome: Ongoing, Progressing  Goal: Readiness for Transition of Care  Outcome: Ongoing, Progressing     Problem: Fall Injury Risk  Goal: Absence of Fall and Fall-Related Injury  Outcome: Ongoing, Progressing

## 2023-08-17 NOTE — PT/OT/SLP PROGRESS
Physical Therapy Treatment    Patient Name:  Sony Smith   MRN:  [id number]    Recommendations:     Discharge Recommendations: rehabilitation facility  Discharge Equipment Recommendations:  (TBD)  Barriers to discharge:  Prior to admission patient was independent with mobility and self-care and there is expectation of returning to prior level of function to maintain independence avoiding readmission. Pt is at high risk of unplanned readmission due to fall risk and not being at prior level of function. The lower level of care cannot provide total interdisciplinary approach needed. Pt is able to tolerate 3 hours of daily therapy. Pt is pleasant and motivated to return to prior level of function.  Pt with multiple stairs at home.       Assessment:     Sony Smith is a 37 y.o. male admitted with a medical diagnosis of Alcohol withdrawal syndrome, with delirium.  He presents with the following impairments/functional limitations: weakness, impaired endurance, impaired self care skills, impaired functional mobility, gait instability, impaired balance, decreased lower extremity function, decreased upper extremity function, decreased ROM, edema, decreased coordination, pain, decreased safety awareness, impaired cognition .    Rehab Prognosis: Good; patient would benefit from acute skilled PT services to address these deficits and reach maximum level of function.    Recent Surgery: * No surgery found *      Plan:     During this hospitalization, patient to be seen 6 x/week to address the identified rehab impairments via gait training, therapeutic activities, therapeutic exercises and progress toward the following goals:    Plan of Care Expires:  08/29/23    Subjective     Chief Complaint: weakness   Patient/Family Comments/goals: pt is pleasantly confused and agreeable to therapy   Pain/Comfort:  Pain Rating 1: 0/10  Pain Rating Post-Intervention 1: 0/10      Objective:     Communicated with nurse prior to session.   Patient found HOB elevated with telemetry, peripheral IV, Condom Catheter, bed alarm upon PT entry to room.     General Precautions: Standard, fall, DT  Orthopedic Precautions: N/A  Braces: N/A  Respiratory Status: Room air     Functional Mobility:  Bed Mobility:     Rolling Left:  stand by assistance  Scooting: contact guard assistance  Bridging: stand by assistance and contact guard assistance  Supine to Sit: minimum assistance and moderate assistance  Transfers:     Sit to Stand: from  bed, bedside commode and from chair maximal assistance and of 2 persons with rolling walker and BUE supported . Pt with increased body shakiness/ tremors upon standing required knees blocked to prevent buckling. Pt tolerated static standing with MAX A for balance from this writer while getting total A for posterior wipe and diaper placement via OT.    Bed to Chair: maximal assistance and of 2 persons with  no AD  using  Squat Pivot  Toilet Transfer (bed <> bedside commode) : maximal assistance and of 2 persons with  no AD  using  Squat Pivot   Balance:  fair/fair+ in sitting, poor in standing       AM-PAC 6 CLICK MOBILITY  Turning over in bed (including adjusting bedclothes, sheets and blankets)?: 3  Sitting down on and standing up from a chair with arms (e.g., wheelchair, bedside commode, etc.): 2  Moving from lying on back to sitting on the side of the bed?: 3  Moving to and from a bed to a chair (including a wheelchair)?: 2  Need to walk in hospital room?: 2  Climbing 3-5 steps with a railing?: 1  Basic Mobility Total Score: 13       Treatment & Education:  Pt performed bed mobility and transfer as above.   Educated pt on safety awareness with all functional mobility and to call nursing staff for assistance  , pt verbalized understanding.   Patient left up in chair on green air cushion with BLE elevated , lunch tray table set up  all lines intact, call button in reach, chair alarm on, nurse Karla  notified, and Sirisha  present..    GOALS:   Multidisciplinary Problems       Physical Therapy Goals          Problem: Physical Therapy    Goal Priority Disciplines Outcome Goal Variances Interventions   Physical Therapy Goal     PT, PT/OT Ongoing, Progressing     Description: Goals to be met by: 23     Patient will increase functional independence with mobility by performin. Supine to sit with Modified [place]  2. Rolling to Left and Right with Modified [place]  3. Sit to stand transfer with Modified [place] using RW  4. Bed to chair transfer with Modified [place]   5. Gait x50 feet with Modified [place] using Rolling Walker   6. Wheelchair propulsion x200 feet with Modified [place] using bilateral upper extremities  7. Lower extremity exercise program 2 sets x15 reps per handout, with independence                         Time Tracking:     PT Received On: 23  PT Start Time: 1157     PT Stop Time: 1235  PT Total Time (min): 38 min     Billable Minutes: Therapeutic Activity 38 min  and Total Time 38 min with OT   1608 - 1619 : 11 min , 1 TA   Pt is ready to get back in bed. Chair to bed : total A with no AD, squat pivot transfer. Sit to supine : mod A . Scooting/bridging : SBA . Pt left HOB elevated, heels offloading, all lines intact , call button in reach , bed alarm on,PCT and Avasys present.    Treatment Type: Treatment  PT/PTA: PTA     Number of PTA visits since last PT visit: 2023

## 2023-08-18 LAB
ALBUMIN SERPL BCP-MCNC: 2.9 G/DL (ref 3.5–5.2)
ALP SERPL-CCNC: 143 U/L (ref 55–135)
ALT SERPL W/O P-5'-P-CCNC: 103 U/L (ref 10–44)
ANION GAP SERPL CALC-SCNC: 9 MMOL/L (ref 8–16)
AST SERPL-CCNC: 117 U/L (ref 10–40)
BACTERIA BLD CULT: NORMAL
BACTERIA BLD CULT: NORMAL
BILIRUB SERPL-MCNC: 0.9 MG/DL (ref 0.1–1)
BUN SERPL-MCNC: 4 MG/DL (ref 6–20)
CALCIUM SERPL-MCNC: 8.4 MG/DL (ref 8.7–10.5)
CHLORIDE SERPL-SCNC: 100 MMOL/L (ref 95–110)
CO2 SERPL-SCNC: 26 MMOL/L (ref 23–29)
CREAT SERPL-MCNC: 0.6 MG/DL (ref 0.5–1.4)
EST. GFR  (NO RACE VARIABLE): >60 ML/MIN/1.73 M^2
GLUCOSE SERPL-MCNC: 83 MG/DL (ref 70–110)
POTASSIUM SERPL-SCNC: 3.8 MMOL/L (ref 3.5–5.1)
PROT SERPL-MCNC: 5.8 G/DL (ref 6–8.4)
SODIUM SERPL-SCNC: 135 MMOL/L (ref 136–145)
VIT B1 BLD-MCNC: 85 UG/L (ref 38–122)

## 2023-08-18 PROCEDURE — 97530 THERAPEUTIC ACTIVITIES: CPT | Mod: CO

## 2023-08-18 PROCEDURE — 97116 GAIT TRAINING THERAPY: CPT | Mod: CQ

## 2023-08-18 PROCEDURE — 63600175 PHARM REV CODE 636 W HCPCS: Performed by: HOSPITALIST

## 2023-08-18 PROCEDURE — 25000003 PHARM REV CODE 250: Performed by: HOSPITALIST

## 2023-08-18 PROCEDURE — 25000003 PHARM REV CODE 250: Performed by: PHYSICIAN ASSISTANT

## 2023-08-18 PROCEDURE — 36415 COLL VENOUS BLD VENIPUNCTURE: CPT | Performed by: HOSPITALIST

## 2023-08-18 PROCEDURE — S4991 NICOTINE PATCH NONLEGEND: HCPCS | Performed by: PHYSICIAN ASSISTANT

## 2023-08-18 PROCEDURE — 11000001 HC ACUTE MED/SURG PRIVATE ROOM

## 2023-08-18 PROCEDURE — 97535 SELF CARE MNGMENT TRAINING: CPT | Mod: CO

## 2023-08-18 PROCEDURE — 80053 COMPREHEN METABOLIC PANEL: CPT | Performed by: HOSPITALIST

## 2023-08-18 PROCEDURE — 97530 THERAPEUTIC ACTIVITIES: CPT | Mod: CQ

## 2023-08-18 RX ADMIN — DIAZEPAM 20 MG: 5 TABLET ORAL at 11:08

## 2023-08-18 RX ADMIN — THERA TABS 1 TABLET: TAB at 07:08

## 2023-08-18 RX ADMIN — Medication 1 PATCH: at 07:08

## 2023-08-18 RX ADMIN — DIAZEPAM 20 MG: 5 TABLET ORAL at 06:08

## 2023-08-18 RX ADMIN — LORAZEPAM 2 MG: 2 INJECTION INTRAMUSCULAR; INTRAVENOUS at 02:08

## 2023-08-18 RX ADMIN — HEPARIN SODIUM 5000 UNITS: 5000 INJECTION INTRAVENOUS; SUBCUTANEOUS at 09:08

## 2023-08-18 RX ADMIN — FOLIC ACID 1 MG: 1 TABLET ORAL at 07:08

## 2023-08-18 RX ADMIN — DIAZEPAM 20 MG: 5 TABLET ORAL at 01:08

## 2023-08-18 RX ADMIN — DIAZEPAM 20 MG: 5 TABLET ORAL at 05:08

## 2023-08-18 RX ADMIN — HEPARIN SODIUM 5000 UNITS: 5000 INJECTION INTRAVENOUS; SUBCUTANEOUS at 01:08

## 2023-08-18 RX ADMIN — THIAMINE HYDROCHLORIDE 500 MG: 100 INJECTION, SOLUTION INTRAMUSCULAR; INTRAVENOUS at 07:08

## 2023-08-18 RX ADMIN — HEPARIN SODIUM 5000 UNITS: 5000 INJECTION INTRAVENOUS; SUBCUTANEOUS at 06:08

## 2023-08-18 NOTE — NURSING
[facility] - [place]  Nurses Note -- 4 Eyes      8/18/2023       Skin assessed on: Q Shift      [x] No Pressure Injuries Present    []Prevention Measures Documented    [] Yes LDA  for Pressure Injury Previously documented     [] Yes New Pressure Injury Discovered   [] LDA for New Pressure Injury Added      Attending RN:  Karla Adhikari LPN     Second RN:  YG Harley

## 2023-08-18 NOTE — PROGRESS NOTES
[facility] Medicine  Progress Note    Patient Name: Sony Smith  MRN: [id number]  Patient Class: IP- Inpatient   Admission Date: 8/14/2023  Length of Stay: 4 days  Attending Physician: Elba Vincent MD  Primary Care Provider: Meghana, Primary Doctor        Subjective:     Principal Problem:Alcohol withdrawal syndrome, with delirium        HPI:  This is a 37 year old male with PMHx of alcohol abuse,who presents to the ED via EMS for chief complaint of Altered Mental Status onset this morning. Patient reports travelling for work and that he has been under stress and his symptoms began in [facility]. Additional history obtained by an independent historian: EMS personnel, notes that when EMS arrived patient was sitting on a lawn chair on the porch incoherent; patient's roommate stated that the patient is a recovering alcoholic and had 1 alcoholic beverage this morning as well as usually being coherent and able to ambulate. Patient was also noted to repeat himself and when asked when his symptoms started he stated they began in July. EMS further noted unsteady gait, and that the patient complained of bilateral forearm and lower extremity pain with edema.  Patient further denies cough, shortness of breath, chest pain, fever, chills, abdominal pain, nausea, vomiting, diarrhea, dysuria, headaches, congestion, sore throat, eye pain, ear pain, rash, or other associated symptoms. Patient denies recreational drug use;patient has elevated lactic acid 10.3,improved with bolus of IVF,he has hypokalemia, 2.9,replaced,CT head show no acute process,patient has lege edema,US legs show no DVT.he has proteinuria on UA.patient is admitted for inpatient status.      Overview/Hospital Course:  Mr Sony Smith was admitted with acute encephalopathy and lower extremity edema. Suspect associated with alcohol use. Started IV thiamine, folate, multivitamin, valium. Regarding lower extremity edema, suspect associated  "with alcoholic hepatitis. Abdominal US no cirrhosis or ascites. TTE no CHF. Given lasix with improvement. PT, OT consulted. Mental status improved but then worsened on 8/16 with worsening withdrawal symptoms. Increased valium dosing to 20mg Q6h. Withdrawal symptoms now improving.       Interval History: Confused but less than yesterday. Alert.     Review of Systems   Neurological:  Positive for tremors.   Psychiatric/Behavioral:  Positive for confusion.      Objective:     Vital Signs (Most Recent):  Temp: 97.9 °F (36.6 °C) (08/18/23 1054)  Pulse: 93 (08/18/23 1054)  Resp: 18 (08/18/23 1054)  BP: 121/82 (08/18/23 1054)  SpO2: 100 % (08/18/23 1054) Vital Signs (24h Range):  Temp:  [97.8 °F (36.6 °C)-98.5 °F (36.9 °C)] 97.9 °F (36.6 °C)  Pulse:  [] 93  Resp:  [16-18] 18  SpO2:  [99 %-100 %] 100 %  BP: (121-127)/(70-83) 121/82     Weight: 67.2 kg (148 lb 2.4 oz)  Body mass index is 21.88 kg/m².    Intake/Output Summary (Last 24 hours) at 8/18/2023 1128  Last data filed at 8/18/2023 0906  Gross per 24 hour   Intake 240 ml   Output 900 ml   Net -660 ml           Physical Exam  Vitals and nursing note reviewed.   Constitutional:       General: He is not in acute distress.     Appearance: He is ill-appearing. He is not toxic-appearing.   HENT:      Head: Normocephalic and atraumatic.   Cardiovascular:      Rate and Rhythm: Normal rate and regular rhythm.   Pulmonary:      Effort: Pulmonary effort is normal.      Breath sounds: Normal breath sounds.      Comments: Room air  Abdominal:      General: Bowel sounds are normal. There is no distension.      Palpations: Abdomen is soft.      Tenderness: There is no abdominal tenderness.   Musculoskeletal:      Right lower leg: No edema.      Left lower leg: No edema.   Skin:     General: Skin is warm and dry.   Neurological:      Mental Status: He is alert. He is disoriented.      Comments: Fine tremor with movement. Oriented to "[place]" and "Hospital" and self. Not " oriented to date, situation.              Significant Labs: All pertinent labs within the past 24 hours have been reviewed.    Significant Imaging: I have reviewed all pertinent imaging results/findings within the past 24 hours.      Assessment/Plan:      * Alcohol withdrawal syndrome, with delirium  Suspect associated with alcohol withdrawal. CTH no acute changes. NH3 normal and abdominal US no cirrhosis.   - started IV thiamine, folate, multivitamin  - started valium  - worsening mental status on 8/16 consistent with worsening withdrawals  - now on valium 20mg Q6h + PRN ativan  - withdrawals are improving- continue current dosing of valium today, may be able to start taper tomorrow   - continue to monitor with CIWA q4    Debility  Very weak when working with PT, OT  - continue PT, OT  - unclear disposition on discharge       Folate deficiency  Supplement       Thrombocytopenia  Plts 113 on admit, no signs of bleeding. Most likely associated with alcohol abuse      Macrocytic anemia  Associated with alcohol abuse and folate deficiency  - supplement folate       Alcoholic hepatitis without ascites  No signs of cirrhosis. Not consistent with acute liver failure. Treating alcohol withdrawal. AST/ALT elevations due to alcoholic hepatitis.       Alcohol abuse  See above      Lower extremity edema  Suspect associated with alcoholic hepatitis. US Abdomen no cirrhosis. UA only 1+ protein, doubt nephrotic syndrome. TTE no CHF.   -resolved with lasix      VTE Risk Mitigation (From admission, onward)           Ordered     heparin (porcine) injection 5,000 Units  Every 8 hours         08/14/23 1449                    Discharge Planning   ROMAIN: 8/21/2023     Code Status: Full Code   Is the patient medically ready for discharge?:     Reason for patient still in hospital (select all that apply): Patient trending condition  Discharge Plan A: Home   Discharge Delays: None known at this time    1:02 PM Called sister Florencia to  update her.       Elba Vincent MD  Department of Hospital Medicine   [facility]

## 2023-08-18 NOTE — PLAN OF CARE
Patient's updated insurance information added to chart. Therapy recommends IPR. Multiple referrals sent via care port for review. TN to continue to follow up.

## 2023-08-18 NOTE — NURSING
[facility] [place]  Nurses Note -- 4 Eyes      8/17/2023       Skin assessed on: Q Shift      [x] No Pressure Injuries Present    []Prevention Measures Documented    [] Yes LDA  for Pressure Injury Previously documented     [] Yes New Pressure Injury Discovered   [] LDA for New Pressure Injury Added      Attending RN:  Keegan Prado RN     Second RN:  jovanni camacho

## 2023-08-18 NOTE — ASSESSMENT & PLAN NOTE
No signs of cirrhosis. Not consistent with acute liver failure. Treating alcohol withdrawal. AST/ALT elevations due to alcoholic hepatitis.

## 2023-08-18 NOTE — ASSESSMENT & PLAN NOTE
Suspect associated with alcohol withdrawal. CTH no acute changes. NH3 normal and abdominal US no cirrhosis.   - started IV thiamine, folate, multivitamin  - started valium  - worsening mental status on 8/16 consistent with worsening withdrawals  - now on valium 20mg Q6h + PRN ativan  - withdrawals are improving- continue current dosing of valium today, may be able to start taper tomorrow   - continue to monitor with CIWA q4

## 2023-08-18 NOTE — NURSING
"No Hallucination noted at this time pt communicating with staff and making his needs known, he states "I woke up and said what the fuck is going on I thought I was dreaming but then I realize I was in the hospital, now I realize alcohol will fuck you up you think you can quit for two years and say it's just one little drink it won't do nothing but then this happens" pt requesting his PS4 game and Sodoku puzzles, will continue to monitor.   "

## 2023-08-18 NOTE — SUBJECTIVE & OBJECTIVE
"Interval History: Confused but less than yesterday. Alert.     Review of Systems   Neurological:  Positive for tremors.   Psychiatric/Behavioral:  Positive for confusion.      Objective:     Vital Signs (Most Recent):  Temp: 97.9 °F (36.6 °C) (08/18/23 1054)  Pulse: 93 (08/18/23 1054)  Resp: 18 (08/18/23 1054)  BP: 121/82 (08/18/23 1054)  SpO2: 100 % (08/18/23 1054) Vital Signs (24h Range):  Temp:  [97.8 °F (36.6 °C)-98.5 °F (36.9 °C)] 97.9 °F (36.6 °C)  Pulse:  [] 93  Resp:  [16-18] 18  SpO2:  [99 %-100 %] 100 %  BP: (121-127)/(70-83) 121/82     Weight: 67.2 kg (148 lb 2.4 oz)  Body mass index is 21.88 kg/m².    Intake/Output Summary (Last 24 hours) at 8/18/2023 1128  Last data filed at 8/18/2023 0906  Gross per 24 hour   Intake 240 ml   Output 900 ml   Net -660 ml           Physical Exam  Vitals and nursing note reviewed.   Constitutional:       General: He is not in acute distress.     Appearance: He is ill-appearing. He is not toxic-appearing.   HENT:      Head: Normocephalic and atraumatic.   Cardiovascular:      Rate and Rhythm: Normal rate and regular rhythm.   Pulmonary:      Effort: Pulmonary effort is normal.      Breath sounds: Normal breath sounds.      Comments: Room air  Abdominal:      General: Bowel sounds are normal. There is no distension.      Palpations: Abdomen is soft.      Tenderness: There is no abdominal tenderness.   Musculoskeletal:      Right lower leg: No edema.      Left lower leg: No edema.   Skin:     General: Skin is warm and dry.   Neurological:      Mental Status: He is alert. He is disoriented.      Comments: Fine tremor with movement. Oriented to "[place]" and "Hospital" and self. Not oriented to date, situation.              Significant Labs: All pertinent labs within the past 24 hours have been reviewed.    Significant Imaging: I have reviewed all pertinent imaging results/findings within the past 24 hours.  " Is This A New Presentation, Or A Follow-Up?: Follow Up Isotretinoin

## 2023-08-18 NOTE — PLAN OF CARE
Problem: Fall Injury Risk  Goal: Absence of Fall and Fall-Related Injury  Intervention: Identify and Manage Contributors  Flowsheets (Taken 8/18/2023 0345)  Self-Care Promotion: BADL personal objects within reach  Medication Review/Management:   medications reviewed   high-risk medications identified  Intervention: Promote Injury-Free Environment  Flowsheets (Taken 8/18/2023 0345)  Safety Promotion/Fall Prevention:   assistive device/personal item within reach   bed alarm set   Fall Risk reviewed with patient/family   Fall Risk signage in place   high risk medications identified   lighting adjusted   medications reviewed   nonskid shoes/socks when out of bed   /camera at bedside   side rails raised x 2     Problem: Skin Injury Risk Increased  Goal: Skin Health and Integrity  Intervention: Promote and Optimize Oral Intake  Flowsheets (Taken 8/18/2023 0345)  Oral Nutrition Promotion: physical activity promoted

## 2023-08-18 NOTE — PT/OT/SLP PROGRESS
Occupational Therapy   Treatment    Name: Sony Smith  MRN: [id number]  Admitting Diagnosis:  Alcohol withdrawal syndrome, with delirium       Recommendations:     Discharge Recommendations: rehabilitation facility  Discharge Equipment Recommendations:  shower chair  Barriers to discharge: none      Assessment:     Sony Smith is a 37 y.o. male with a medical diagnosis of Alcohol withdrawal syndrome, with delirium.  He presents with the following performance deficits affecting function:  weakness, impaired functional mobility, impaired endurance, gait instability, impaired balance, impaired self care skills, impaired cognition, decreased coordination, decreased upper extremity function, decreased lower extremity function, decreased ROM, decreased safety awareness, impaired coordination, impaired fine motor.     Rehab Prognosis:  Good; patient would benefit from acute skilled OT services to address these deficits and reach maximum level of function.       Plan:     Patient to be seen 5 x/week to address the above listed problems via self-care/home management, neuromuscular re-education, therapeutic activities, therapeutic exercises  Plan of Care Expires: 08/29/23  Plan of Care Reviewed with: patient    Subjective     Chief Complaint: needing to go to the bathroom  Patient/Family Comments/goals: Pt agreeable to therapy.  Pain/Comfort:  Pain Rating 1: 0/10    Objective:     Communicated with: Nurse Acosta prior to session.  Patient found HOB elevated with telemetry, peripheral IV, Condom Catheter, bed alarm, SCD upon OT entry to room.    General Precautions: Standard, DT, fall    Orthopedic Precautions:N/A  Braces: N/A  Respiratory Status: Room air     Occupational Performance: Pt still confused, but mental status improved from previous session. Increased time required to perform tasks.     Bed Mobility: Increased time to perform.     Patient completed Scooting/Bridging with stand by assistance  Patient  completed Supine to Sit with stand by assistance  Patient completed Sit to Supine with contact guard assistance     Functional Mobility/Transfers:  Patient completed Sit <> Stand Transfer with maximal assistance and of 2 persons  with  rolling walker   Patient completed Bed>BSC Transfer Squat Pivot technique with maximal assistance x 2 persons  BSC>bed stand pivot maximal assistance of 2 persons  Pt with severe posterior leaning upon standing and therefore difficulty taking steps.     Activities of Daily Living:  Upper Body Dressing: minimum assistance  to don back gown  Toileting: total assistance for posterior pericare in standing position with assist from PCT. MAX A x 2 persons for static balance during pericare. Increased time for patient to have BM. Unsafe for pt to be left alone during toileting 2* confusion and high fall risk. Once back in bed pt able to perform posterior pericare supine/sidelying with set up A      [facility] 6 Click ADL: 15    Treatment & Education:  Bed mobility, functional transfer/mobility , and ADL completed as noted above.   Pt educated on safety awareness with all OOB mobility and ADL .   Static standing balance during ADL max A x 2 persons 2* posterior leaning.    Patient left supine with all lines intact, call button in reach, bed alarm on, and nurse present    GOALS:   Multidisciplinary Problems       Occupational Therapy Goals          Problem: Occupational Therapy    Goal Priority Disciplines Outcome Interventions   Occupational Therapy Goal     OT, PT/OT Ongoing, Progressing    Description: Goals to be met by: 8/29/23      Patient will increase functional independence with ADLs by performing:    UE Dressing with [place].  LE Dressing with [place].  Grooming while standing at sink with [place].  Toileting from toilet with [place] for hygiene and clothing management.   Toilet transfer to toilet with [place].  Increased functional strength to 5/5 for B upper  extremity .  Upper extremity exercise program x15-20 reps per handout, with independence.                         Time Tracking:     OT Date of Treatment: 08/18/23  OT Start Time: 1145  OT Stop Time: 1240  OT Total Time (min): 55 min    Billable Minutes:Self Care/Home Management 30  Therapeutic Activity 25  Co-tx with PT    OT/JOHANNY: JOHANNY     Number of JOHANNY visits since last OT visit: 1    8/18/2023

## 2023-08-18 NOTE — PT/OT/SLP PROGRESS
Physical Therapy Treatment    Patient Name:  Sony Smith   MRN:  [id number]    Recommendations:     Discharge Recommendations: rehabilitation facility  Discharge Equipment Recommendations:  (TBD)  Barriers to discharge: Prior to admission patient was independent with mobility and self-care and there is expectation of returning to prior level of function to maintain independence avoiding readmission. Pt is at high risk of unplanned readmission due to fall risk and not being at prior level of function. The lower level of care cannot provide total interdisciplinary approach needed. Pt is able to tolerate 3 hours of daily therapy. Pt is pleasant and motivated to return to prior level of function.  Pt with multiple stairs at home.    Assessment:     Sony Smith is a 37 y.o. male admitted with a medical diagnosis of Alcohol withdrawal syndrome, with delirium.  He presents with the following impairments/functional limitations: weakness, impaired endurance, impaired functional mobility, impaired self care skills, impaired balance, gait instability, decreased lower extremity function, decreased upper extremity function, decreased ROM, pain, decreased safety awareness, impaired cognition, decreased coordination, impaired coordination . Pt is less confused than yesterday, pt is till with body tremors/shakiness required less assistance for bed mobility however still required  max A x 2 persons for transfer.     Rehab Prognosis: Good; patient would benefit from acute skilled PT services to address these deficits and reach maximum level of function.    Recent Surgery: * No surgery found *      Plan:     During this hospitalization, patient to be seen 6 x/week to address the identified rehab impairments via gait training, therapeutic activities, therapeutic exercises and progress toward the following goals:    Plan of Care Expires:  08/29/23    Subjective     Chief Complaint: tired of being in bed   Patient/Family  Comments/goals: to walk to the bathroom.   Pain/Comfort:  Pain Rating 1: 0/10  Pain Rating Post-Intervention 1: 0/10      Objective:     Communicated with nurse prior to session.  Patient found HOB elevated with telemetry, bed alarm, peripheral IV, Condom Catheter upon PT entry to room.     General Precautions: Standard, fall, DT  Orthopedic Precautions: N/A  Braces: N/A  Respiratory Status: Room air     Functional Mobility:  Bed Mobility:     Rolling Left:  stand by assistance  Scooting: contact guard assistance  Bridging: stand by assistance and contact guard assistance  Supine to Sit: stand by assistance , HOB elevated, bedside rail   Sit to supine : CGA .   Transfers:     Sit to Stand: from  bed, bedside commode maximal assistance and of 2 persons with rolling walker and BUE supported . Pt with increased body shakiness/ tremors upon standing required knees blocked to prevent buckling. Pt tolerated static standing with MAX A for balance from this writer while getting total A for posterior wipe and diaper placement via OT.    Bed > bedside commode : maximal assistance and of 2 persons with  no AD  using  Squat Pivot   Bedside commode > bed  : maximal assistance and of 2 persons with RW  using  Stand  Pivot   Balance:  fair+/good in sitting( able to sit unsupported EOB and on bedside commode , pt with improve trunk control, able lean fwd multiple times, pt is very fidgeting, constantly fixing his condom cath placement/tube  , 1 hand supported on beside commode armrest w/o LOB ; pt required Supervision for safety and balance  )  , poor in standing        AM-PAC 6 CLICK MOBILITY  Turning over in bed (including adjusting bedclothes, sheets and blankets)?: 2  Sitting down on and standing up from a chair with arms (e.g., wheelchair, bedside commode, etc.): 2  Moving from lying on back to sitting on the side of the bed?: 2  Moving to and from a bed to a chair (including a wheelchair)?: 2  Need to walk in hospital room?:  2  Climbing 3-5 steps with a railing?: 1  Basic Mobility Total Score: 11       Treatment & Education:  Increased treatment time 2* pt to have BM , unsafe to leave pt alone 2* to confusion and decreased safety awareness (pt unaware of his deficits ).   Instructed pt to perform BLE AP, SAQ , hip ABD//ADD while in bed .     Patient left supine with all lines intact, call button in reach, bed alarm on, and nurse and [employer] present..    GOALS:   Multidisciplinary Problems       Physical Therapy Goals          Problem: Physical Therapy    Goal Priority Disciplines Outcome Goal Variances Interventions   Physical Therapy Goal     PT, PT/OT Ongoing, Progressing     Description: Goals to be met by: 23     Patient will increase functional independence with mobility by performin. Supine to sit with Modified [place]  2. Rolling to Left and Right with Modified [place]  3. Sit to stand transfer with Modified [place] using RW  4. Bed to chair transfer with Modified [place]   5. Gait x50 feet with Modified [place] using Rolling Walker   6. Wheelchair propulsion x200 feet with Modified [place] using bilateral upper extremities  7. Lower extremity exercise program 2 sets x15 reps per handout, with independence                         Time Tracking:     PT Received On: 23  PT Start Time: 1145     PT Stop Time: 1240  PT Total Time (min): 55 min     Billable Minutes: Therapeutic Activity 55  and Total Time 55 min with OT     Treatment Type: Treatment  PT/PTA: PTA     Number of PTA visits since last PT visit: 2     2023

## 2023-08-18 NOTE — NURSING
[facility] [place]  Nurses Note -- 4 Eyes      8/18/2023       Skin assessed on: Q Shift      [x] No Pressure Injuries Present    []Prevention Measures Documented    [] Yes LDA  for Pressure Injury Previously documented     [] Yes New Pressure Injury Discovered   [] LDA for New Pressure Injury Added      Attending RN:  Karla Adhikari LPN     Second RN:  YG Almaguer

## 2023-08-18 NOTE — PLAN OF CARE
Problem: Occupational Therapy  Goal: Occupational Therapy Goal  Description: Goals to be met by: 8/29/23      Patient will increase functional independence with ADLs by performing:    UE Dressing with [place].  LE Dressing with [place].  Grooming while standing at sink with [place].  Toileting from toilet with [place] for hygiene and clothing management.   Toilet transfer to toilet with [place].  Increased functional strength to 5/5 for B upper extremity .  Upper extremity exercise program x15-20 reps per handout, with independence.    Outcome: Ongoing, Progressing

## 2023-08-18 NOTE — PLAN OF CARE
Case Management Re-assessment      PCP: [facility]  Pharmacy: [facility] (unable to disclose location)     Patient Arrived From: Home  Existing Help at Home: Friend     Barriers to Discharge: Patient is not able to provide insurance information.      Discharge Plan:               A. Home              B. Home    Per therapy recommendation for IPR. Pt uninsured. Per physician continue to treat alcohol withdrawal.  Spoke with patient's sister Florencia regarding dc planning. Pt's sister stated he lives Carmine Jacobs, his roommate who will provide transportation home once medically clear for dc.[place] to continue to follow for dc needs.    08/18/23 1034   Discharge Reassessment   Assessment Type Discharge Planning Reassessment   Did the patient's condition or plan change since previous assessment? No   Discharge Plan discussed with: Patient   Communicated ROMAIN with patient/caregiver Date not available/Unable to determine   DME Needed Upon Discharge  none   Transition of Care Barriers Substance Abuse   Why the patient remains in the hospital Requires continued medical care   Post-Acute Status   Discharge Delays None known at this time

## 2023-08-18 NOTE — PLAN OF CARE
Problem: Adult Inpatient Plan of Care  Goal: Plan of Care Review  Outcome: Ongoing, Progressing  Flowsheets (Taken 8/18/2023 1622)  Plan of Care Reviewed With: patient  Goal: Patient-Specific Goal (Individualized)  Outcome: Ongoing, Progressing  Goal: Absence of Hospital-Acquired Illness or Injury  Outcome: Ongoing, Progressing  Intervention: Identify and Manage Fall Risk  Flowsheets (Taken 8/18/2023 1622)  Safety Promotion/Fall Prevention:   assistive device/personal item within reach   high risk medications identified   bed alarm set   Fall Risk reviewed with patient/family   side rails raised x 2   nonskid shoes/socks when out of bed   room near unit station   instructed to call staff for mobility   medications reviewed  Intervention: Prevent Skin Injury  Flowsheets (Taken 8/18/2023 1622)  Body Position:   position changed independently   weight shifting  Skin Protection:   adhesive use limited   tubing/devices free from skin contact  Intervention: Prevent and Manage VTE (Venous Thromboembolism) Risk  Flowsheets (Taken 8/18/2023 1622)  Activity Management:   Arm raise - L1   Ankle pumps - L1   Rolling - L1   Straight leg raise - L1  VTE Prevention/Management:   ambulation promoted   bleeding precations maintained   bleeding risk assessed  Range of Motion: active ROM (range of motion) encouraged  Intervention: Prevent Infection  Flowsheets (Taken 8/18/2023 1622)  Infection Prevention: hand hygiene promoted  Goal: Optimal Comfort and Wellbeing  Outcome: Ongoing, Progressing  Goal: Readiness for Transition of Care  Outcome: Ongoing, Progressing     Problem: Fall Injury Risk  Goal: Absence of Fall and Fall-Related Injury  Outcome: Ongoing, Progressing  Intervention: Identify and Manage Contributors  Flowsheets (Taken 8/18/2023 1622)  Self-Care Promotion:   independence encouraged   safe use of adaptive equipment encouraged   meal set-up provided   BADL personal routines maintained   BADL personal objects within  reach  Medication Review/Management:   medications reviewed   high-risk medications identified  Intervention: Promote Injury-Free Environment  Flowsheets (Taken 8/18/2023 1622)  Safety Promotion/Fall Prevention:   assistive device/personal item within reach   high risk medications identified   bed alarm set   Fall Risk reviewed with patient/family   side rails raised x 2   nonskid shoes/socks when out of bed   room near unit station   instructed to call staff for mobility   medications reviewed     Problem: Skin Injury Risk Increased  Goal: Skin Health and Integrity  Outcome: Ongoing, Progressing  Intervention: Optimize Skin Protection  Flowsheets (Taken 8/18/2023 1622)  Pressure Reduction Techniques:   frequent weight shift encouraged   weight shift assistance provided  Skin Protection:   adhesive use limited   tubing/devices free from skin contact  Head of Bed (HOB) Positioning: HOB at 30-45 degrees     Problem: Alcohol Withdrawal  Goal: Alcohol Withdrawal Symptom Control  Outcome: Ongoing, Progressing  Intervention: Minimize or Manage Alcohol Withdrawal Symptoms  Flowsheets (Taken 8/18/2023 1622)  Seizure Precautions: clutter-free environment maintained  Sensory Stimulation Regulation:   television on   visual stimulation minimized   quiet environment promoted   care clustered     Problem: Acute Neurologic Deterioration (Alcohol Withdrawal)  Goal: Optimal Neurologic Function  Outcome: Ongoing, Progressing  Intervention: Minimize or Manage Acute Neurologic Symptoms  Flowsheets (Taken 8/18/2023 1622)  Airway/Ventilation Management:   calming measures promoted   pulmonary hygiene promoted  Sensory Stimulation Regulation:   television on   visual stimulation minimized   quiet environment promoted   care clustered  Intervention: Prevent Seizure-Related Injury  Flowsheets (Taken 8/18/2023 1622)  Seizure Precautions: clutter-free environment maintained     Problem: Substance Misuse (Alcohol Withdrawal)  Goal: Readiness  for Change Identified  Outcome: Ongoing, Progressing  Intervention: Promote Psychosocial Wellbeing  Flowsheets (Taken 8/18/2023 1622)  Family/Support System Care: self-care encouraged  Intervention: Partner to Facilitate Behavior Change  Flowsheets (Taken 8/18/2023 1622)  Supportive Measures:   active listening utilized   positive reinforcement provided   relaxation techniques promoted   problem-solving facilitated   self-care encouraged   self-responsibility promoted   self-reflection promoted   verbalization of feelings encouraged   Pt alert able to make needs known,nahomy meds well,no s/s adverse reaction noted,reposition self q 2hrs,pain controlled by prn pain medication,POC explained,remains free from falls and pressure injuries,safety maintained,continue monitoring.

## 2023-08-19 LAB
ALBUMIN SERPL BCP-MCNC: 2.7 G/DL (ref 3.5–5.2)
ALP SERPL-CCNC: 149 U/L (ref 55–135)
ALT SERPL W/O P-5'-P-CCNC: 93 U/L (ref 10–44)
ANION GAP SERPL CALC-SCNC: 9 MMOL/L (ref 8–16)
AST SERPL-CCNC: 87 U/L (ref 10–40)
BASOPHILS # BLD AUTO: 0.03 K/UL (ref 0–0.2)
BASOPHILS NFR BLD: 0.5 % (ref 0–1.9)
BILIRUB SERPL-MCNC: 0.9 MG/DL (ref 0.1–1)
BUN SERPL-MCNC: 4 MG/DL (ref 6–20)
CALCIUM SERPL-MCNC: 8.4 MG/DL (ref 8.7–10.5)
CHLORIDE SERPL-SCNC: 102 MMOL/L (ref 95–110)
CO2 SERPL-SCNC: 24 MMOL/L (ref 23–29)
CREAT SERPL-MCNC: 0.5 MG/DL (ref 0.5–1.4)
DIFFERENTIAL METHOD: ABNORMAL
EOSINOPHIL # BLD AUTO: 0.1 K/UL (ref 0–0.5)
EOSINOPHIL NFR BLD: 1.4 % (ref 0–8)
ERYTHROCYTE [DISTWIDTH] IN BLOOD BY AUTOMATED COUNT: 15.6 % (ref 11.5–14.5)
EST. GFR  (NO RACE VARIABLE): >60 ML/MIN/1.73 M^2
GLUCOSE SERPL-MCNC: 80 MG/DL (ref 70–110)
HCT VFR BLD AUTO: 26 % (ref 40–54)
HGB BLD-MCNC: 9 G/DL (ref 14–18)
IMM GRANULOCYTES # BLD AUTO: 0.06 K/UL (ref 0–0.04)
IMM GRANULOCYTES NFR BLD AUTO: 1 % (ref 0–0.5)
INR PPP: 1 (ref 0.8–1.2)
LYMPHOCYTES # BLD AUTO: 1.3 K/UL (ref 1–4.8)
LYMPHOCYTES NFR BLD: 21.6 % (ref 18–48)
MCH RBC QN AUTO: 42.9 PG (ref 27–31)
MCHC RBC AUTO-ENTMCNC: 34.6 G/DL (ref 32–36)
MCV RBC AUTO: 124 FL (ref 82–98)
MONOCYTES # BLD AUTO: 0.5 K/UL (ref 0.3–1)
MONOCYTES NFR BLD: 8.1 % (ref 4–15)
NEUTROPHILS # BLD AUTO: 4.2 K/UL (ref 1.8–7.7)
NEUTROPHILS NFR BLD: 67.4 % (ref 38–73)
NRBC BLD-RTO: 0 /100 WBC
PLATELET # BLD AUTO: 103 K/UL (ref 150–450)
PMV BLD AUTO: 9 FL (ref 9.2–12.9)
POTASSIUM SERPL-SCNC: 3.8 MMOL/L (ref 3.5–5.1)
PROT SERPL-MCNC: 5.6 G/DL (ref 6–8.4)
PROTHROMBIN TIME: 10.3 SEC (ref 9–12.5)
RBC # BLD AUTO: 2.1 M/UL (ref 4.6–6.2)
SODIUM SERPL-SCNC: 135 MMOL/L (ref 136–145)
WBC # BLD AUTO: 6.21 K/UL (ref 3.9–12.7)

## 2023-08-19 PROCEDURE — 97542 WHEELCHAIR MNGMENT TRAINING: CPT | Mod: CQ

## 2023-08-19 PROCEDURE — 80053 COMPREHEN METABOLIC PANEL: CPT | Performed by: HOSPITALIST

## 2023-08-19 PROCEDURE — S4991 NICOTINE PATCH NONLEGEND: HCPCS | Performed by: HOSPITALIST

## 2023-08-19 PROCEDURE — 25000003 PHARM REV CODE 250: Performed by: HOSPITALIST

## 2023-08-19 PROCEDURE — 85025 COMPLETE CBC W/AUTO DIFF WBC: CPT | Performed by: HOSPITALIST

## 2023-08-19 PROCEDURE — 97110 THERAPEUTIC EXERCISES: CPT | Mod: CQ

## 2023-08-19 PROCEDURE — 63600175 PHARM REV CODE 636 W HCPCS: Performed by: HOSPITALIST

## 2023-08-19 PROCEDURE — 36415 COLL VENOUS BLD VENIPUNCTURE: CPT | Performed by: HOSPITALIST

## 2023-08-19 PROCEDURE — 85610 PROTHROMBIN TIME: CPT | Performed by: HOSPITALIST

## 2023-08-19 PROCEDURE — 97110 THERAPEUTIC EXERCISES: CPT

## 2023-08-19 PROCEDURE — 97116 GAIT TRAINING THERAPY: CPT | Mod: CQ

## 2023-08-19 PROCEDURE — 97530 THERAPEUTIC ACTIVITIES: CPT

## 2023-08-19 PROCEDURE — 11000001 HC ACUTE MED/SURG PRIVATE ROOM

## 2023-08-19 RX ORDER — DIAZEPAM 5 MG/1
15 TABLET ORAL EVERY 6 HOURS
Status: DISCONTINUED | OUTPATIENT
Start: 2023-08-19 | End: 2023-08-21

## 2023-08-19 RX ORDER — IBUPROFEN 200 MG
1 TABLET ORAL DAILY
Status: DISCONTINUED | OUTPATIENT
Start: 2023-08-19 | End: 2023-08-28 | Stop reason: HOSPADM

## 2023-08-19 RX ADMIN — DIAZEPAM 15 MG: 5 TABLET ORAL at 05:08

## 2023-08-19 RX ADMIN — THIAMINE HYDROCHLORIDE 500 MG: 100 INJECTION, SOLUTION INTRAMUSCULAR; INTRAVENOUS at 09:08

## 2023-08-19 RX ADMIN — FOLIC ACID 1 MG: 1 TABLET ORAL at 08:08

## 2023-08-19 RX ADMIN — THERA TABS 1 TABLET: TAB at 08:08

## 2023-08-19 RX ADMIN — DIAZEPAM 15 MG: 5 TABLET ORAL at 12:08

## 2023-08-19 RX ADMIN — DIAZEPAM 15 MG: 5 TABLET ORAL at 11:08

## 2023-08-19 RX ADMIN — HEPARIN SODIUM 5000 UNITS: 5000 INJECTION INTRAVENOUS; SUBCUTANEOUS at 01:08

## 2023-08-19 RX ADMIN — Medication 1 PATCH: at 08:08

## 2023-08-19 RX ADMIN — HEPARIN SODIUM 5000 UNITS: 5000 INJECTION INTRAVENOUS; SUBCUTANEOUS at 09:08

## 2023-08-19 RX ADMIN — DIAZEPAM 20 MG: 5 TABLET ORAL at 05:08

## 2023-08-19 RX ADMIN — HEPARIN SODIUM 5000 UNITS: 5000 INJECTION INTRAVENOUS; SUBCUTANEOUS at 05:08

## 2023-08-19 NOTE — SUBJECTIVE & OBJECTIVE
Interval History: Withdrawal symptoms improving. Complains of feeling weak.     Review of Systems   Neurological:  Positive for weakness. Negative for tremors.   Psychiatric/Behavioral:  Positive for confusion.      Objective:     Vital Signs (Most Recent):  Temp: 97.7 °F (36.5 °C) (08/19/23 0715)  Pulse: 97 (08/19/23 0715)  Resp: 16 (08/19/23 0715)  BP: 115/76 (08/19/23 0715)  SpO2: 100 % (08/19/23 0715) Vital Signs (24h Range):  Temp:  [97.6 °F (36.4 °C)-98.2 °F (36.8 °C)] 97.7 °F (36.5 °C)  Pulse:  [] 97  Resp:  [16-18] 16  SpO2:  [99 %-100 %] 100 %  BP: (111-130)/(68-80) 115/76     Weight: 67.2 kg (148 lb 2.4 oz)  Body mass index is 21.88 kg/m².    Intake/Output Summary (Last 24 hours) at 8/19/2023 1148  Last data filed at 8/19/2023 1024  Gross per 24 hour   Intake 289.1 ml   Output 2600 ml   Net -2310.9 ml           Physical Exam  Vitals and nursing note reviewed.   Constitutional:       General: He is not in acute distress.     Appearance: He is ill-appearing. He is not toxic-appearing.   HENT:      Head: Normocephalic and atraumatic.   Cardiovascular:      Rate and Rhythm: Normal rate and regular rhythm.   Pulmonary:      Effort: Pulmonary effort is normal.      Breath sounds: Normal breath sounds.      Comments: Room air  Musculoskeletal:      Right lower leg: No edema.      Left lower leg: No edema.   Skin:     General: Skin is warm and dry.   Neurological:      Mental Status: He is alert. He is disoriented.      Comments: No tremor. Oriented to self and location. Not oriented to date or situation             Significant Labs: All pertinent labs within the past 24 hours have been reviewed.    Significant Imaging: I have reviewed all pertinent imaging results/findings within the past 24 hours.

## 2023-08-19 NOTE — PLAN OF CARE
Problem: Adult Inpatient Plan of Care  Goal: Plan of Care Review  Outcome: Met  Goal: Patient-Specific Goal (Individualized)  Outcome: Met  Goal: Absence of Hospital-Acquired Illness or Injury  Outcome: Met  Goal: Optimal Comfort and Wellbeing  Outcome: Met  Goal: Readiness for Transition of Care  Outcome: Met     Problem: Fall Injury Risk  Goal: Absence of Fall and Fall-Related Injury  Outcome: Met     Problem: Skin Injury Risk Increased  Goal: Skin Health and Integrity  Outcome: Met     Problem: Alcohol Withdrawal  Goal: Alcohol Withdrawal Symptom Control  Outcome: Met     Problem: Acute Neurologic Deterioration (Alcohol Withdrawal)  Goal: Optimal Neurologic Function  Outcome: Met     Problem: Substance Misuse (Alcohol Withdrawal)  Goal: Readiness for Change Identified  Outcome: Met

## 2023-08-19 NOTE — NURSING
[facility], [facility]  Nurses Note -- 4 Eyes    Received pt on chair, awake. RA and not in distress. VSS taken and recorded. Oriented to place but not in person and time. Will continue to monitor.     8/19/2023       Skin assessed on: Q Shift      [x] No Pressure Injuries Present    [x]Prevention Measures Documented    [] Yes LDA  for Pressure Injury Previously documented     [] Yes New Pressure Injury Discovered   [] LDA for New Pressure Injury Added      Attending RN:  Catherine Blakely RN     Second RN:  Tracy Butcher RN

## 2023-08-19 NOTE — PLAN OF CARE
Patient AAO, respirations even and unlabored on room air, no acute distress noted. Patient oriented to self and year, but he named Luis Fernando Hoover as the current president. Patient states he's in [place] at a friend's house. Patient denies hallucinations and he is lying in bed calmly. Safety sitter at bedside. All safety precautions maintained. Will continue to monitor.      Problem: Adult Inpatient Plan of Care  Goal: Absence of Hospital-Acquired Illness or Injury  Outcome: Ongoing, Progressing  Intervention: Identify and Manage Fall Risk  Flowsheets (Taken 8/19/2023 0618)  Safety Promotion/Fall Prevention:   assistive device/personal item within reach   bed alarm set   Fall Risk reviewed with patient/family   room near unit station   /camera at bedside

## 2023-08-19 NOTE — PT/OT/SLP PROGRESS
Physical Therapy Treatment    Patient Name:  Sony Smith   MRN:  [id number]    Recommendations:     Discharge Recommendations: rehabilitation facility  Discharge Equipment Recommendations:  (TBD)  Barriers to discharge: Prior to admission patient was independent with mobility and self-care and there is expectation of returning to prior level of function to maintain independence avoiding readmission. Pt is at high risk of unplanned readmission due to fall risk and not being at prior level of function. The lower level of care cannot provide total interdisciplinary approach needed. Pt is able to tolerate 3 hours of daily therapy. Pt is pleasant and motivated to return to prior level of function.  Pt with multiple stairs at home.    Assessment:     Sony Smith is a 37 y.o. male admitted with a medical diagnosis of Alcohol withdrawal syndrome, with delirium.  He presents with the following impairments/functional limitations: weakness, impaired endurance, impaired self care skills, impaired functional mobility, gait instability, impaired balance, decreased lower extremity function, decreased upper extremity function, decreased ROM, edema, decreased coordination, pain, impaired cognition, decreased safety awareness, impaired coordination .    Rehab Prognosis: Good; patient would benefit from acute skilled PT services to address these deficits and reach maximum level of function.    Recent Surgery: * No surgery found *      Plan:     During this hospitalization, patient to be seen 6 x/week to address the identified rehab impairments via gait training, therapeutic activities, therapeutic exercises and progress toward the following goals:    Plan of Care Expires:  08/29/23    Subjective     Chief Complaint: weakness   Patient/Family Comments/goals: pt is pleasant and agreeable to therapy   Pain/Comfort:  Location - Side 1: Left  Location 1: knee  Pain Addressed 1: Reposition      Objective:     Communicated with  nurse  prior to session.  Patient found sitting edge of bed with safety sitter telemetry, bed alarm, peripheral IV upon PT entry to room.     General Precautions: Standard, fall, DT  Orthopedic Precautions: N/A  Braces: N/A  Respiratory Status: Room air     Functional Mobility:  Transfers:     Sit to Stand: 3 trials from wheelchair maximal assistance and of 2 persons with rolling walker and BUE supported . Pt with increased body shakiness/ tremors upon standing .   Bed to wheelchair: moderate assistance and of 2 persons with  no AD  using  Squat Pivot  Wheelchair to bedside chair: maximal assistance and of 2 persons with  no AD  using  Squat Pivot   Balance:  fair+ in sitting, poor in standing   Gait : trained 10 steps on level tile with Rolling Walker with Max Assistance x 2  (wheelchair followed) .  Pt with demonstarting an ataxic gait ;  required max A to advance BLE . Noted with decreased juno, increased time in double stance, decreased velocity of limb motion, decreased step length, decreased swing-to-stance ratio, decreased toe-to-floor clearance and decreased weight-shifting ability.Impairments contributing to gait deviations include impaired balance, decreased flexibility, pain, impaired postural control, decreased ROM and decreased strength. V/T cues for safety technique and walker management.    Wheelchair Propulsion:  Pt propelled Standard wheelchair x 100  feet on Level tile with  Bilateral upper extremity and Bilateral lower extremity with Stand-by Assistance. Pt required V/T cues to lock/unlock wheelchair breaks.       AM-PAC 6 CLICK MOBILITY  Turning over in bed (including adjusting bedclothes, sheets and blankets)?: 4  Sitting down on and standing up from a chair with arms (e.g., wheelchair, bedside commode, etc.): 2  Moving from lying on back to sitting on the side of the bed?: 3  Moving to and from a bed to a chair (including a wheelchair)?: 2  Need to walk in hospital room?: 2  Climbing 3-5  steps with a railing?: 1  Basic Mobility Total Score: 14       Treatment & Education:  Lower Extremity Exercises.    Patient educated on: Purpose and Benefits of Therapeutic Exercise(s).    Patient verbalized acceptance/understanding of instruction(s), expectation(s), and limitation(s) (for safety).  Patient performed: 15 reps (each) of B LE Ther Ex: AP, LAQ, HSC , Hip flexion while sitting up on EOB and BLE x 10 reps x 2 : AP in reclined chair .       Patient required still requires verbal cues/tactile cues to ensure correct sequence, to maintain proper form, and to allow for self-correction.  Pt reported no complaints or problems with exercise activity.     Patient required increase reaction time to initiate movements and rest breaks between set(s) to recover.         Patient left up in reclined chair on green air cushion , BLE elevated with all lines intact, call button in reach, and safety sitter present..    GOALS:   Multidisciplinary Problems       Physical Therapy Goals          Problem: Physical Therapy    Goal Priority Disciplines Outcome Goal Variances Interventions   Physical Therapy Goal     PT, PT/OT Ongoing, Progressing     Description: Goals to be met by: 23     Patient will increase functional independence with mobility by performin. Supine to sit with Modified [place]  2. Rolling to Left and Right with Modified [place]  3. Sit to stand transfer with Modified [place] using RW  4. Bed to chair transfer with Modified [place]   5. Gait x50 feet with Modified [place] using Rolling Walker   6. Wheelchair propulsion x200 feet with Modified [place] using bilateral upper extremities  7. Lower extremity exercise program 2 sets x15 reps per handout, with independence                         Time Tracking:     PT Received On: 23  PT Start Time: 1510     PT Stop Time: 1548  PT Total Time (min): 38 min     Billable Minutes: Gait Training 8, Therapeutic Exercise 12,  and Train/Wheelchair Management 18  total 38 min with OT     Treatment Type: Treatment  PT/PTA: PTA     Number of PTA visits since last PT visit: 1 08/19/2023

## 2023-08-19 NOTE — PROGRESS NOTES
[facility] Medicine  Progress Note    Patient Name: Sony Smith  MRN: [id number]  Patient Class: IP- Inpatient   Admission Date: 8/14/2023  Length of Stay: 5 days  Attending Physician: Elba Vincent MD  Primary Care Provider: Meghana, Primary Doctor        Subjective:     Principal Problem:Alcohol withdrawal syndrome, with delirium        HPI:  This is a 37 year old male with PMHx of alcohol abuse,who presents to the ED via EMS for chief complaint of Altered Mental Status onset this morning. Patient reports travelling for work and that he has been under stress and his symptoms began in [facility]. Additional history obtained by an independent historian: EMS personnel, notes that when EMS arrived patient was sitting on a lawn chair on the porch incoherent; patient's roommate stated that the patient is a recovering alcoholic and had 1 alcoholic beverage this morning as well as usually being coherent and able to ambulate. Patient was also noted to repeat himself and when asked when his symptoms started he stated they began in July. EMS further noted unsteady gait, and that the patient complained of bilateral forearm and lower extremity pain with edema.  Patient further denies cough, shortness of breath, chest pain, fever, chills, abdominal pain, nausea, vomiting, diarrhea, dysuria, headaches, congestion, sore throat, eye pain, ear pain, rash, or other associated symptoms. Patient denies recreational drug use;patient has elevated lactic acid 10.3,improved with bolus of IVF,he has hypokalemia, 2.9,replaced,CT head show no acute process,patient has lege edema,US legs show no DVT.he has proteinuria on UA.patient is admitted for inpatient status.      Overview/Hospital Course:  Mr Sony Smith was admitted with acute encephalopathy and lower extremity edema. Suspect associated with alcohol use. Started IV thiamine, folate, multivitamin, valium. Regarding lower extremity edema, suspect associated  with alcoholic hepatitis. Abdominal US no cirrhosis or ascites. TTE no CHF. Given lasix with improvement. PT, OT consulted. Mental status improved but then worsened on 8/16 with worsening withdrawal symptoms. Increased valium dosing to 20mg Q6h. Withdrawal symptoms now improving. Weaning valium.       Interval History: Withdrawal symptoms improving. Complains of feeling weak.     Review of Systems   Neurological:  Positive for weakness. Negative for tremors.   Psychiatric/Behavioral:  Positive for confusion.      Objective:     Vital Signs (Most Recent):  Temp: 97.7 °F (36.5 °C) (08/19/23 0715)  Pulse: 97 (08/19/23 0715)  Resp: 16 (08/19/23 0715)  BP: 115/76 (08/19/23 0715)  SpO2: 100 % (08/19/23 0715) Vital Signs (24h Range):  Temp:  [97.6 °F (36.4 °C)-98.2 °F (36.8 °C)] 97.7 °F (36.5 °C)  Pulse:  [] 97  Resp:  [16-18] 16  SpO2:  [99 %-100 %] 100 %  BP: (111-130)/(68-80) 115/76     Weight: 67.2 kg (148 lb 2.4 oz)  Body mass index is 21.88 kg/m².    Intake/Output Summary (Last 24 hours) at 8/19/2023 1148  Last data filed at 8/19/2023 1024  Gross per 24 hour   Intake 289.1 ml   Output 2600 ml   Net -2310.9 ml           Physical Exam  Vitals and nursing note reviewed.   Constitutional:       General: He is not in acute distress.     Appearance: He is ill-appearing. He is not toxic-appearing.   HENT:      Head: Normocephalic and atraumatic.   Cardiovascular:      Rate and Rhythm: Normal rate and regular rhythm.   Pulmonary:      Effort: Pulmonary effort is normal.      Breath sounds: Normal breath sounds.      Comments: Room air  Musculoskeletal:      Right lower leg: No edema.      Left lower leg: No edema.   Skin:     General: Skin is warm and dry.   Neurological:      Mental Status: He is alert. He is disoriented.      Comments: No tremor. Oriented to self and location. Not oriented to date or situation             Significant Labs: All pertinent labs within the past 24 hours have been reviewed.    Significant  Imaging: I have reviewed all pertinent imaging results/findings within the past 24 hours.      Assessment/Plan:      * Alcohol withdrawal syndrome, with delirium  Suspect associated with alcohol withdrawal. CTH no acute changes. NH3 normal and abdominal US no cirrhosis.   - started IV thiamine, folate, multivitamin  - started valium  - worsening mental status on 8/16 consistent with worsening withdrawals- increased valium  - withdrawals are improving- decrease valium to 15mg Q6  - continue to monitor with CIWA q4    Debility  Very weak when working with PT, OT  - continue PT, OT  - unclear disposition on discharge       Folate deficiency  Supplement       Thrombocytopenia  Plts 113 on admit, no signs of bleeding. Most likely associated with alcohol abuse      Macrocytic anemia  Associated with alcohol abuse and folate deficiency  - supplement folate       Alcoholic hepatitis without ascites  No signs of cirrhosis. Not consistent with acute liver failure. Treating alcohol withdrawal. AST/ALT elevations due to alcoholic hepatitis.       Alcohol abuse  See above      Lower extremity edema  Suspect associated with alcoholic hepatitis. US Abdomen no cirrhosis. UA only 1+ protein, doubt nephrotic syndrome. TTE no CHF.   -resolved with lasix      VTE Risk Mitigation (From admission, onward)         Ordered     heparin (porcine) injection 5,000 Units  Every 8 hours         08/14/23 1449                Discharge Planning   ROMAIN: 8/21/2023     Code Status: Full Code   Is the patient medically ready for discharge?:     Reason for patient still in hospital (select all that apply): Patient trending condition  Discharge Plan A: Home   Discharge Delays: None known at this time    11:50 AM Called sister Florencia to update her. No answer. Left message.           Elba Vincent MD  Department of Hospital Medicine   [facility]

## 2023-08-19 NOTE — ASSESSMENT & PLAN NOTE
Suspect associated with alcohol withdrawal. CTH no acute changes. NH3 normal and abdominal US no cirrhosis.   - started IV thiamine, folate, multivitamin  - started valium  - worsening mental status on 8/16 consistent with worsening withdrawals- increased valium  - withdrawals are improving- decrease valium to 15mg Q6  - continue to monitor with CIWA q4

## 2023-08-19 NOTE — PT/OT/SLP PROGRESS
Occupational Therapy   Treatment    Name: Sony Smith  MRN: [id number]  Admitting Diagnosis:  Alcohol withdrawal syndrome, with delirium       Recommendations:     Discharge Recommendations: rehabilitation facility  Discharge Equipment Recommendations:  to be determined by next level of care  Barriers to discharge:  Prior to admission patient was independent with mobility and self-care and there is expectation of returning to prior level of function to maintain independence avoiding readmission. Pt is at high risk of unplanned readmission due to fall risk and not being at prior level of function. The lower level of care cannot provide total interdisciplinary approach needed. Pt is able to tolerate 3 hours of daily therapy. Pt is pleasant and motivated to return to prior level of function.  Pt with multiple stairs at home    Assessment:     Sony Smith is a 37 y.o. male with a medical diagnosis of Alcohol withdrawal syndrome, with delirium. Performance deficits affecting function are weakness, gait instability, decreased upper extremity function, decreased ROM, impaired endurance, impaired balance, decreased lower extremity function, decreased safety awareness, impaired fine motor, impaired cognition, impaired self care skills, impaired functional mobility, decreased coordination.     Rehab Prognosis:   Good ; patient would benefit from acute skilled OT services to address these deficits and reach maximum level of function.       Plan:     Patient to be seen  (5-6x/week) to address the above listed problems via self-care/home management, therapeutic activities, therapeutic exercises, wheelchair management/training  Plan of Care Expires: 08/29/23  Plan of Care Reviewed with: patient    Subjective     Chief Complaint: wants to get stronger   Patient/Family Comments/goals: thought he fell at work yesterday; pt was shocked with re-oriented to situation/time; pt was calm, motivated, and cooperative throughout  session     D/w safety sitter who assisted pt to the BSC and to the chair already this AM. Pt was sitting at EOB interacting with sitter upon OT arrival.     Pain/Comfort:  Pain Rating 1: 0/10    Objective:     Patient found sitting edge of bed with telemetry, peripheral IV (safety sitter) upon OT entry to room.    General Precautions: Standard, DT, fall    Orthopedic Precautions:N/A  Braces: N/A  Respiratory Status: Room air     Occupational Performance:     Bed Mobility:    Patient completed Scooting posteriorly with contact guard assistance     Functional Mobility/Transfers:  Patient completed Sit <> Stand Transfer with maximal assistance and of 2 persons  x2 trials using RW from the w/c; x1 trial from w/c with BUE supported on side rail of hospital hallway   Patient completed Bed > W/c Transfer using Squat Pivot technique with moderate assistance and of 2 persons  Patient completed W/c > chair Squat Pivot technique with maximal assistance and of 2 persons  Functional Mobility: Gait belt donned prior to transfer for safety during mobility/transfers. Pt completed x 10 steps forward with MAX A x2 using RW with w/c closely following. Very mild tremors noted with seated activity, but significant BLE tremors noted with standing and steps. Pt required manual assist to advance BLE with PT and OT d/t weakness and tremors; ataxic gait observed. Max cueing for BUE placement on RW to assist with support of upright posture. Despite cueing and assist, pt unable to assist with safe hand placement of stand>sit in the w/c. Pt practiced locking/unlocked w/c three times, but required cueing and MIN A to complete each time. Pt manually propelled w/c ~100 ft with SBA-SUP with cueing intermittently to safely navigate around obstacles in the hallway. 3 cues for improved mechanics of BUE with propulsions. Pt demo'd ability to stand statically with B/L knees blocked and MAX A x2 with BUE supported on hospital hallway railing (while  looking out the window) for 50 sec- pt able to correct posture with frequent cueing for BUE/BLE positioning.     Activities of Daily Living:  Upper Body Dressing: minimum assistance to don back gown while seated unsupported at EOB       [facility] 6 Click ADL: 15    Treatment & Education:  Pt re-educated on OT role/POC; re-oriented to place, situation, time   Importance of OOB activity with staff assistance.  Safety during functional t/f and mobility   Seated EOB, pt completed BUE simultaneous AROM x12:   shoulder flex/ext, shoulder horizontal ab/dduction, forward punches with min cueing for upright posture d/t minimal posterior lean  Seated in the chair, pt completed x10 ther ex with green theraband:   elbow extension, shoulder horizontal ab/dduction, external rotation    Self-care tasks/functional mobility completed- assistance level noted above   All questions/concerns answered within OT scope of practice       Patient left  seated on air cushion reclined in the chair with tray table in front of pt  with all lines intact, call button in reach, safety sitter present, and all needs met/within reach; pt's wallet in pt's telemetry monitor pouch per pt request.     GOALS:   Multidisciplinary Problems       Occupational Therapy Goals          Problem: Occupational Therapy    Goal Priority Disciplines Outcome Interventions   Occupational Therapy Goal     OT, PT/OT Ongoing, Progressing    Description: Goals to be met by: 8/29/23      Patient will increase functional independence with ADLs by performing:    UE Dressing with Modified [place].  LE Dressing with Stand-by Assistance.  Grooming while seated with Supervision.  Toileting from [facility] with with supervision for hygiene and clothing management.   Toilet transfer to [facility] with Minimal Assistance.  Supine<>sit with supervision  Step transfer with Minimal Assistance  Upper extremity exercise program x15 reps per handout, with independence   Manual w/c propulsion using  BUE/BLE ~50 ft with modified independence                          Time Tracking:     OT Date of Treatment: 08/19/23  OT Start Time: 1511  OT Stop Time: 1550  OT Total Time (min): 39 min    Billable Minutes:Therapeutic Activity 25 min  Therapeutic Exercise 14 min  Total Time 39 min (co-treat with PTA)    OT/JOHANNY: OT     Number of JOHANNY visits since last OT visit: 0    8/19/2023

## 2023-08-19 NOTE — PLAN OF CARE
Problem: Occupational Therapy  Goal: Occupational Therapy Goal  Description: Goals to be met by: 8/29/23      Patient will increase functional independence with ADLs by performing:    UE Dressing with Modified [place].  LE Dressing with Stand-by Assistance.  Grooming while seated with Supervision.  Toileting from [facility] with with supervision for hygiene and clothing management.   Toilet transfer to [facility] with Minimal Assistance.  Supine<>sit with supervision  Step transfer with Minimal Assistance  Upper extremity exercise program x15 reps per handout, with independence   Manual w/c propulsion using BUE/BLE ~50 ft with modified independence     Outcome: Ongoing, Progressing     Pt demo'd good activity tolerance to session. Pt calm and still confused, but redirectable.

## 2023-08-20 PROCEDURE — 25000003 PHARM REV CODE 250: Performed by: HOSPITALIST

## 2023-08-20 PROCEDURE — 11000001 HC ACUTE MED/SURG PRIVATE ROOM

## 2023-08-20 PROCEDURE — S4991 NICOTINE PATCH NONLEGEND: HCPCS | Performed by: HOSPITALIST

## 2023-08-20 PROCEDURE — 63600175 PHARM REV CODE 636 W HCPCS: Performed by: HOSPITALIST

## 2023-08-20 RX ADMIN — Medication 1 PATCH: at 08:08

## 2023-08-20 RX ADMIN — DIAZEPAM 15 MG: 5 TABLET ORAL at 11:08

## 2023-08-20 RX ADMIN — THIAMINE HYDROCHLORIDE 500 MG: 100 INJECTION, SOLUTION INTRAMUSCULAR; INTRAVENOUS at 08:08

## 2023-08-20 RX ADMIN — DIAZEPAM 15 MG: 5 TABLET ORAL at 06:08

## 2023-08-20 RX ADMIN — HEPARIN SODIUM 5000 UNITS: 5000 INJECTION INTRAVENOUS; SUBCUTANEOUS at 09:08

## 2023-08-20 RX ADMIN — THERA TABS 1 TABLET: TAB at 08:08

## 2023-08-20 RX ADMIN — HEPARIN SODIUM 5000 UNITS: 5000 INJECTION INTRAVENOUS; SUBCUTANEOUS at 06:08

## 2023-08-20 RX ADMIN — FOLIC ACID 1 MG: 1 TABLET ORAL at 08:08

## 2023-08-20 RX ADMIN — ACETAMINOPHEN 650 MG: 325 TABLET ORAL at 03:08

## 2023-08-20 RX ADMIN — DIAZEPAM 15 MG: 5 TABLET ORAL at 05:08

## 2023-08-20 RX ADMIN — HEPARIN SODIUM 5000 UNITS: 5000 INJECTION INTRAVENOUS; SUBCUTANEOUS at 01:08

## 2023-08-20 NOTE — PROGRESS NOTES
[facility]  Progress Note    Patient Name: Sony Smith  MRN: [id number]  Patient Class: IP- Inpatient   Admission Date: 8/14/2023  Length of Stay: 6 days  Attending Physician: Elba Vincent MD  Primary Care Provider: Meghana, Primary Doctor        Subjective:     Principal Problem:Alcohol withdrawal syndrome, with delirium        HPI:  This is a 37 year old male with PMHx of alcohol abuse,who presents to the ED via EMS for chief complaint of Altered Mental Status onset this morning. Patient reports travelling for work and that he has been under stress and his symptoms began in [facility]. Additional history obtained by an independent historian: EMS personnel, notes that when EMS arrived patient was sitting on a lawn chair on the porch incoherent; patient's roommate stated that the patient is a recovering alcoholic and had 1 alcoholic beverage this morning as well as usually being coherent and able to ambulate. Patient was also noted to repeat himself and when asked when his symptoms started he stated they began in July. EMS further noted unsteady gait, and that the patient complained of bilateral forearm and lower extremity pain with edema.  Patient further denies cough, shortness of breath, chest pain, fever, chills, abdominal pain, nausea, vomiting, diarrhea, dysuria, headaches, congestion, sore throat, eye pain, ear pain, rash, or other associated symptoms. Patient denies recreational drug use;patient has elevated lactic acid 10.3,improved with bolus of IVF,he has hypokalemia, 2.9,replaced,CT head show no acute process,patient has lege edema,US legs show no DVT.he has proteinuria on UA.patient is admitted for inpatient status.      Overview/Hospital Course:  Mr Sony Smith was admitted with acute encephalopathy and lower extremity edema. Suspect associated with alcohol use. Started IV thiamine (Wernicke treatment protocol), folate, multivitamin, valium. Regarding lower extremity  edema, suspect associated with alcoholic hepatitis. Abdominal US no cirrhosis or ascites. TTE no CHF. Given lasix with improvement. PT, OT consulted. Mental status improved but then worsened on 8/16 with worsening withdrawal symptoms. Increased valium dosing to 20mg Q6h. Withdrawal symptoms improved. Weaning valium slowly. He remains confused.       Interval History: Confused overnight with poor sleep. Sleeping calmly now    Review of Systems   Neurological:  Negative for tremors.   Psychiatric/Behavioral:  Positive for confusion.      Objective:     Vital Signs (Most Recent):  Temp: 97.5 °F (36.4 °C) (08/20/23 0727)  Pulse: 88 (08/20/23 0727)  Resp: 17 (08/20/23 0727)  BP: 116/70 (08/20/23 0727)  SpO2: 100 % (08/20/23 0727) Vital Signs (24h Range):  Temp:  [97.5 °F (36.4 °C)-98.1 °F (36.7 °C)] 97.5 °F (36.4 °C)  Pulse:  [] 88  Resp:  [17-20] 17  SpO2:  [98 %-100 %] 100 %  BP: (108-120)/(62-75) 116/70     Weight: 67.2 kg (148 lb 2.4 oz)  Body mass index is 21.88 kg/m².    Intake/Output Summary (Last 24 hours) at 8/20/2023 1107  Last data filed at 8/20/2023 1028  Gross per 24 hour   Intake 220.12 ml   Output 600 ml   Net -379.88 ml           Physical Exam  Vitals and nursing note reviewed.   Constitutional:       General: He is not in acute distress.     Appearance: He is ill-appearing. He is not toxic-appearing.   HENT:      Head: Normocephalic and atraumatic.   Cardiovascular:      Rate and Rhythm: Normal rate and regular rhythm.   Pulmonary:      Effort: Pulmonary effort is normal.      Breath sounds: Normal breath sounds.      Comments: Room air  Musculoskeletal:      Right lower leg: No edema.      Left lower leg: No edema.   Skin:     General: Skin is warm and dry.   Neurological:      Mental Status: He is disoriented.             Significant Labs: All pertinent labs within the past 24 hours have been reviewed.    Significant Imaging: I have reviewed all pertinent imaging results/findings within the past  24 hours.      Assessment/Plan:      * Alcohol withdrawal syndrome, with delirium  Suspect associated with alcohol withdrawal. CTH no acute changes. NH3 normal and abdominal US no cirrhosis.   - started IV thiamine (Wernicke's protocol), folate, multivitamin  - started valium  - worsening mental status on 8/16 consistent with worsening withdrawals- increased valium  - withdrawals improved  - now on valium to 15mg Q6- continue for today   - continue to monitor with CIWA q4    Debility  Very weak when working with PT, OT  - continue PT, OT  - unclear disposition on discharge       Folate deficiency  Supplement       Thrombocytopenia  Plts 113 on admit, no signs of bleeding. Most likely associated with alcohol abuse      Macrocytic anemia  Associated with alcohol abuse and folate deficiency  - supplement folate       Alcoholic hepatitis without ascites  No signs of cirrhosis. Not consistent with acute liver failure. Treating alcohol withdrawal. AST/ALT elevations due to alcoholic hepatitis.       Alcohol abuse  See above      Lower extremity edema  Suspect associated with alcoholic hepatitis. US Abdomen no cirrhosis. UA only 1+ protein, doubt nephrotic syndrome. TTE no CHF.   -resolved with lasix      VTE Risk Mitigation (From admission, onward)         Ordered     heparin (porcine) injection 5,000 Units  Every 8 hours         08/14/23 1449                Discharge Planning   ROMAIN: 8/21/2023     Code Status: Full Code   Is the patient medically ready for discharge?:     Reason for patient still in hospital (select all that apply): Patient trending condition  Discharge Plan A: Home   Discharge Delays: None known at this time              Elba Vincent MD  Department of Hospital Medicine   [facility]

## 2023-08-20 NOTE — PLAN OF CARE
Problem: Adult Inpatient Plan of Care  Goal: Patient-Specific Goal (Individualized)  Outcome: Ongoing, Progressing  Goal: Absence of Hospital-Acquired Illness or Injury  Outcome: Ongoing, Progressing  Goal: Optimal Comfort and Wellbeing  Outcome: Ongoing, Progressing     Problem: Fall Injury Risk  Goal: Absence of Fall and Fall-Related Injury  Outcome: Ongoing, Progressing     Problem: Alcohol Withdrawal  Goal: Alcohol Withdrawal Symptom Control  Outcome: Ongoing, Progressing     Problem: Skin Injury Risk Increased  Goal: Skin Health and Integrity  Outcome: Ongoing, Progressing

## 2023-08-20 NOTE — SUBJECTIVE & OBJECTIVE
Interval History: Confused overnight with poor sleep. Sleeping calmly now    Review of Systems   Neurological:  Negative for tremors.   Psychiatric/Behavioral:  Positive for confusion.      Objective:     Vital Signs (Most Recent):  Temp: 97.5 °F (36.4 °C) (08/20/23 0727)  Pulse: 88 (08/20/23 0727)  Resp: 17 (08/20/23 0727)  BP: 116/70 (08/20/23 0727)  SpO2: 100 % (08/20/23 0727) Vital Signs (24h Range):  Temp:  [97.5 °F (36.4 °C)-98.1 °F (36.7 °C)] 97.5 °F (36.4 °C)  Pulse:  [] 88  Resp:  [17-20] 17  SpO2:  [98 %-100 %] 100 %  BP: (108-120)/(62-75) 116/70     Weight: 67.2 kg (148 lb 2.4 oz)  Body mass index is 21.88 kg/m².    Intake/Output Summary (Last 24 hours) at 8/20/2023 1107  Last data filed at 8/20/2023 1028  Gross per 24 hour   Intake 220.12 ml   Output 600 ml   Net -379.88 ml           Physical Exam  Vitals and nursing note reviewed.   Constitutional:       General: He is not in acute distress.     Appearance: He is ill-appearing. He is not toxic-appearing.   HENT:      Head: Normocephalic and atraumatic.   Cardiovascular:      Rate and Rhythm: Normal rate and regular rhythm.   Pulmonary:      Effort: Pulmonary effort is normal.      Breath sounds: Normal breath sounds.      Comments: Room air  Musculoskeletal:      Right lower leg: No edema.      Left lower leg: No edema.   Skin:     General: Skin is warm and dry.   Neurological:      Mental Status: He is disoriented.             Significant Labs: All pertinent labs within the past 24 hours have been reviewed.    Significant Imaging: I have reviewed all pertinent imaging results/findings within the past 24 hours.

## 2023-08-20 NOTE — NURSING
[facility], [facility] [place]  Nurses Note -- 4 Eyes    Received pt on bed, awake. On RA noted and not in distress. With saline locked on his left forearm. With visible of slight redness on his buttock. Bed in low position. Side rails up. Call light within reach. Will Continue to monitor.    8/20/2023       Skin assessed on: Q Shift      [x] No Pressure Injuries Present    []Prevention Measures Documented    [] Yes LDA  for Pressure Injury Previously documented     [] Yes New Pressure Injury Discovered   [] LDA for New Pressure Injury Added      Attending RN:  Catherine Blakely, YG     Second RN:  Tracy Butcher RN

## 2023-08-20 NOTE — NURSING
[facility], [facility] [place]  Nurses Note -- 4 Eyes  Pt lying in bed, On room air, unlabored. Safety sitter at bedside. No complaint of HA. Bed in low position, wheels locked. Side rails up. Call light within reach.    8/19/2023       Skin assessed on: Q Shift      [x] No Pressure Injuries Present    []Prevention Measures Documented    [] Yes LDA  for Pressure Injury Previously documented     [] Yes New Pressure Injury Discovered   [] LDA for New Pressure Injury Added      Attending RN:  Vira Wilkins RN     Second RN:  Tracy Butcher, YG Blakely RN

## 2023-08-20 NOTE — NURSING
"Pt intermittently confused on location and situation. Continues to call for "Carmine" (roommate) while staff was present, asked for staff to grab him is mail. Pt stated he ordered pizza, but never made the transaction. Reiterated to pt that he was in the hospital.   "

## 2023-08-20 NOTE — ASSESSMENT & PLAN NOTE
Suspect associated with alcohol withdrawal. CTH no acute changes. NH3 normal and abdominal US no cirrhosis.   - started IV thiamine (Wernicke's protocol), folate, multivitamin  - started valium  - worsening mental status on 8/16 consistent with worsening withdrawals- increased valium  - withdrawals improved  - now on valium to 15mg Q6- continue for today   - continue to monitor with CIWA q4

## 2023-08-21 PROCEDURE — 97542 WHEELCHAIR MNGMENT TRAINING: CPT | Mod: CQ

## 2023-08-21 PROCEDURE — 25000003 PHARM REV CODE 250: Performed by: STUDENT IN AN ORGANIZED HEALTH CARE EDUCATION/TRAINING PROGRAM

## 2023-08-21 PROCEDURE — 97530 THERAPEUTIC ACTIVITIES: CPT | Mod: CQ

## 2023-08-21 PROCEDURE — 63600175 PHARM REV CODE 636 W HCPCS: Performed by: HOSPITALIST

## 2023-08-21 PROCEDURE — 97535 SELF CARE MNGMENT TRAINING: CPT

## 2023-08-21 PROCEDURE — 25000003 PHARM REV CODE 250: Performed by: HOSPITALIST

## 2023-08-21 PROCEDURE — 97530 THERAPEUTIC ACTIVITIES: CPT

## 2023-08-21 PROCEDURE — 11000001 HC ACUTE MED/SURG PRIVATE ROOM

## 2023-08-21 PROCEDURE — S4991 NICOTINE PATCH NONLEGEND: HCPCS | Performed by: HOSPITALIST

## 2023-08-21 RX ORDER — DIAZEPAM 5 MG/1
10 TABLET ORAL EVERY 6 HOURS
Status: DISCONTINUED | OUTPATIENT
Start: 2023-08-21 | End: 2023-08-22

## 2023-08-21 RX ADMIN — DIAZEPAM 10 MG: 5 TABLET ORAL at 06:08

## 2023-08-21 RX ADMIN — HEPARIN SODIUM 5000 UNITS: 5000 INJECTION INTRAVENOUS; SUBCUTANEOUS at 02:08

## 2023-08-21 RX ADMIN — DIAZEPAM 15 MG: 5 TABLET ORAL at 06:08

## 2023-08-21 RX ADMIN — Medication 1 PATCH: at 08:08

## 2023-08-21 RX ADMIN — HEPARIN SODIUM 5000 UNITS: 5000 INJECTION INTRAVENOUS; SUBCUTANEOUS at 10:08

## 2023-08-21 RX ADMIN — HEPARIN SODIUM 5000 UNITS: 5000 INJECTION INTRAVENOUS; SUBCUTANEOUS at 06:08

## 2023-08-21 RX ADMIN — FOLIC ACID 1 MG: 1 TABLET ORAL at 08:08

## 2023-08-21 RX ADMIN — THERA TABS 1 TABLET: TAB at 08:08

## 2023-08-21 RX ADMIN — THIAMINE HYDROCHLORIDE 500 MG: 100 INJECTION, SOLUTION INTRAMUSCULAR; INTRAVENOUS at 09:08

## 2023-08-21 RX ADMIN — DIAZEPAM 15 MG: 5 TABLET ORAL at 12:08

## 2023-08-21 RX ADMIN — DIAZEPAM 10 MG: 5 TABLET ORAL at 12:08

## 2023-08-21 NOTE — SUBJECTIVE & OBJECTIVE
Interval History: reports of disorientation last night, but currently oriented to self, location and somewhat situation. He is able to tell me history of alcohol use and drug use in the past.     Review of Systems   Neurological:  Negative for tremors.   Psychiatric/Behavioral:  Positive for confusion.      Objective:     Vital Signs (Most Recent):  Temp: 98.3 °F (36.8 °C) (08/21/23 0655)  Pulse: 91 (08/21/23 0655)  Resp: 18 (08/21/23 0655)  BP: 113/69 (08/21/23 0655)  SpO2: 98 % (08/21/23 0655) Vital Signs (24h Range):  Temp:  [97.5 °F (36.4 °C)-98.3 °F (36.8 °C)] 98.3 °F (36.8 °C)  Pulse:  [] 91  Resp:  [17-18] 18  SpO2:  [98 %-100 %] 98 %  BP: (109-124)/(65-75) 113/69     Weight: 67.2 kg (148 lb 2.4 oz)  Body mass index is 21.88 kg/m².    Intake/Output Summary (Last 24 hours) at 8/21/2023 0858  Last data filed at 8/21/2023 0543  Gross per 24 hour   Intake 580.12 ml   Output 950 ml   Net -369.88 ml           Physical Exam  Vitals and nursing note reviewed.   Constitutional:       General: He is not in acute distress.     Appearance: He is ill-appearing. He is not toxic-appearing.   HENT:      Head: Normocephalic and atraumatic.   Cardiovascular:      Rate and Rhythm: Normal rate and regular rhythm.   Pulmonary:      Effort: Pulmonary effort is normal.      Breath sounds: Normal breath sounds.      Comments: Room air  Musculoskeletal:      Right lower leg: No edema.      Left lower leg: No edema.   Skin:     General: Skin is warm and dry.             Significant Labs: All pertinent labs within the past 24 hours have been reviewed.    Significant Imaging: I have reviewed all pertinent imaging results/findings within the past 24 hours.

## 2023-08-21 NOTE — PLAN OF CARE
Problem: Adult Inpatient Plan of Care  Goal: Plan of Care Review  Outcome: Ongoing, Progressing  Flowsheets (Taken 8/21/2023 1614)  Plan of Care Reviewed With: patient  Goal: Patient-Specific Goal (Individualized)  Outcome: Ongoing, Progressing  Goal: Absence of Hospital-Acquired Illness or Injury  Outcome: Ongoing, Progressing  Intervention: Identify and Manage Fall Risk  Flowsheets (Taken 8/21/2023 1614)  Safety Promotion/Fall Prevention:   Fall Risk reviewed with patient/family   assistive device/personal item within reach   nonskid shoes/socks when out of bed   high risk medications identified   side rails raised x 2   room near unit station   instructed to call staff for mobility   medications reviewed   bed alarm set  Intervention: Prevent Skin Injury  Flowsheets (Taken 8/21/2023 1614)  Body Position:   position changed independently   weight shifting  Skin Protection:   adhesive use limited   tubing/devices free from skin contact  Intervention: Prevent and Manage VTE (Venous Thromboembolism) Risk  Flowsheets (Taken 8/21/2023 1614)  Activity Management:   Arm raise - L1   Ankle pumps - L1   Straight leg raise - L1   Rolling - L1  VTE Prevention/Management:   ambulation promoted   bleeding precations maintained   bleeding risk assessed  Range of Motion: active ROM (range of motion) encouraged  Intervention: Prevent Infection  Flowsheets (Taken 8/21/2023 1614)  Infection Prevention: hand hygiene promoted  Goal: Optimal Comfort and Wellbeing  Outcome: Ongoing, Progressing  Goal: Readiness for Transition of Care  Outcome: Ongoing, Progressing     Problem: Fall Injury Risk  Goal: Absence of Fall and Fall-Related Injury  Outcome: Ongoing, Progressing  Intervention: Identify and Manage Contributors  Flowsheets (Taken 8/21/2023 1614)  Self-Care Promotion:   independence encouraged   safe use of adaptive equipment encouraged   BADL personal objects within reach   BADL personal routines maintained   meal set-up  provided  Medication Review/Management:   medications reviewed   high-risk medications identified  Intervention: Promote Injury-Free Environment  Flowsheets (Taken 8/21/2023 1614)  Safety Promotion/Fall Prevention:   Fall Risk reviewed with patient/family   assistive device/personal item within reach   nonskid shoes/socks when out of bed   high risk medications identified   side rails raised x 2   room near unit station   instructed to call staff for mobility   medications reviewed   bed alarm set     Problem: Skin Injury Risk Increased  Goal: Skin Health and Integrity  Outcome: Ongoing, Progressing  Intervention: Optimize Skin Protection  Flowsheets (Taken 8/21/2023 1614)  Pressure Reduction Techniques:   frequent weight shift encouraged   weight shift assistance provided  Skin Protection:   adhesive use limited   tubing/devices free from skin contact  Head of Bed (HOB) Positioning: HOB at 30-45 degrees     Problem: Alcohol Withdrawal  Goal: Alcohol Withdrawal Symptom Control  Outcome: Ongoing, Progressing  Intervention: Minimize or Manage Alcohol Withdrawal Symptoms  Flowsheets (Taken 8/21/2023 1614)  Seizure Precautions: clutter-free environment maintained   Pt alert able to make needs known,nahomy meds well,no s/s adverse reaction noted,reposition self q 2hrs, no c/o pain ,POC explained,remains free from falls and pressure injuries,safety maintained,continue monitoring.

## 2023-08-21 NOTE — ASSESSMENT & PLAN NOTE
Suspect associated with alcohol withdrawal. CTH no acute changes. NH3 normal and abdominal US no cirrhosis.   - started IV thiamine (Wernicke's protocol), folate, multivitamin  - started valium  - worsening mental status on 8/16 consistent with worsening withdrawals- increased valium  - withdrawals improved  - now on valium to 15mg Q6- taper to 10 mg q6h today  - continue to monitor with CIWA q4

## 2023-08-21 NOTE — NURSING
Pt in bed, disoriented to situation and place. Complaint of pain on R ankle. No complaint of numbness or tingling nor HA/N/V.    [facility], [facility]  Nurses Note -- 4 Eyes      8/20/2023       Skin assessed on: Q Shift      [x] No Pressure Injuries Present    []Prevention Measures Documented    [] Yes LDA  for Pressure Injury Previously documented     [] Yes New Pressure Injury Discovered   [] LDA for New Pressure Injury Added      Attending RN:  Vira Wilkins RN     Second RN:  YG Millan RN

## 2023-08-21 NOTE — PLAN OF CARE
Problem: Adult Inpatient Plan of Care  Goal: Patient-Specific Goal (Individualized)  Outcome: Ongoing, Progressing  Goal: Optimal Comfort and Wellbeing  Outcome: Ongoing, Progressing  Goal: Readiness for Transition of Care  Outcome: Ongoing, Progressing     Problem: Alcohol Withdrawal  Goal: Alcohol Withdrawal Symptom Control  Outcome: Ongoing, Progressing

## 2023-08-21 NOTE — PLAN OF CARE
Problem: Occupational Therapy  Goal: Occupational Therapy Goal  Description: Goals to be met by: 8/29/23      Patient will increase functional independence with ADLs by performing:    UE Dressing with Modified [place].  LE Dressing with Stand-by Assistance.  Grooming while seated with Supervision.  Toileting from [facility] with with supervision for hygiene and clothing management.   Toilet transfer to [facility] with Minimal Assistance.  Supine<>sit with supervision  Step transfer with Minimal Assistance  Upper extremity exercise program x15 reps per handout, with independence   Manual w/c propulsion using BUE/BLE ~50 ft with modified independence     Patient propelled w/c around unit approx. 500' with cues needed to use arms more fully to get longer strides with chair to conserve energy.

## 2023-08-21 NOTE — NURSING
[facility], [facility] [place]  Nurses Note -- 4 Eyes      8/21/2023       Skin assessed on: Q Shift      [x] No Pressure Injuries Present    []Prevention Measures Documented    [] Yes LDA  for Pressure Injury Previously documented     [] Yes New Pressure Injury Discovered   [] LDA for New Pressure Injury Added      Attending RN:  Karla Adhikari LPN     Second RN:  YG Constantino

## 2023-08-21 NOTE — PT/OT/SLP PROGRESS
"Physical Therapy Treatment    Patient Name:  Sony Smith   MRN:  [id number]    Recommendations:     Discharge Recommendations: rehabilitation facility  Discharge Equipment Recommendations:  (TBD)  Barriers to discharge:   Prior to admission patient was independent with mobility and self-care and there is expectation of returning to prior level of function to maintain independence avoiding readmission. Pt is at high risk of unplanned readmission due to fall risk and not being at prior level of function. The lower level of care cannot provide total interdisciplinary approach needed. Pt is able to tolerate 3 hours of daily therapy. Pt is pleasant and motivated to return to prior level of function.  Pt with multiple stairs at home.    Assessment:     Sony Smith is a 37 y.o. male admitted with a medical diagnosis of Alcohol withdrawal syndrome, with delirium.  He presents with the following impairments/functional limitations: weakness, impaired endurance, impaired self care skills, impaired functional mobility, impaired balance, decreased coordination, decreased upper extremity function, decreased lower extremity function, decreased safety awareness.    Rehab Prognosis: Good; patient would benefit from acute skilled PT services to address these deficits and reach maximum level of function.    Recent Surgery: * No surgery found *      Plan:     During this hospitalization, patient to be seen 6 x/week to address the identified rehab impairments via gait training, therapeutic activities, therapeutic exercises and progress toward the following goals:    Plan of Care Expires:  08/29/23    Subjective     Chief Complaint: "I don't want to work." (Pt mean working as a job)  Patient/Family Comments/goals: Pt agreed to participate.  Pain/Comfort:  Pain Rating 1: 0/10      Objective:     Communicated with nurse Acosta prior to session.  Patient found up in chair with peripheral IV, telemetry ([employer] and safety sitter) " upon PT entry to room.     General Precautions: Standard, fall  Orthopedic Precautions: N/A  Braces: N/A  Respiratory Status: Room air     Functional Mobility:  Transfers:  gait belt and back gown donned prior transfer activity   Sit to Stand: 2 trials from BS Chair (1 using RW and 1 with HHA) and 1 trials from Wheelchair with maximal assistance and of 2 persons with rolling walker and HHA   BS Chair to Wheelchair: Mod/Max A of 2 persons with hand-held assist using  Squat Pivot  Wheelchair ro BS Chair: Mod/Max A of 2 persons with hand-held assist using  Squat Pivot  Balance: Poor Standing  Wheelchair Propulsion: Pt propelled Standard wheelchair x ~ 500 feet on Level tile with  Bilateral upper extremity with Stand-by Assistance; v/c for  and avoiding obstacles in hallway; 1 rest break 2* fatigue. Pt moved slow and required increased time to complete tasks      AM-PAC 6 CLICK MOBILITY  Turning over in bed (including adjusting bedclothes, sheets and blankets)?: 4  Sitting down on and standing up from a chair with arms (e.g., wheelchair, bedside commode, etc.): 2  Moving from lying on back to sitting on the side of the bed?: 3  Moving to and from a bed to a chair (including a wheelchair)?: 2  Need to walk in hospital room?: 2  Climbing 3-5 steps with a railing?: 1  Basic Mobility Total Score: 14       Treatment & Education:  Educated pt on benefits of OOB activity with hospital staff assistance and performing BLE ex throughout the day.      Patient left up in chair on cushion seat with [employer], tray table + lunch tray in front, all lines intact, call button in reach, nurse notified, and safety sitter present.    GOALS:   Multidisciplinary Problems       Physical Therapy Goals          Problem: Physical Therapy    Goal Priority Disciplines Outcome Goal Variances Interventions   Physical Therapy Goal     PT, PT/OT Ongoing, Progressing     Description: Goals to be met by: 8/29/23     Patient will increase  functional independence with mobility by performin. Supine to sit with Modified [place]  2. Rolling to Left and Right with Modified [place]  3. Sit to stand transfer with Modified [place] using RW  4. Bed to chair transfer with Modified [place]   5. Gait x50 feet with Modified [place] using Rolling Walker   6. Wheelchair propulsion x200 feet with Modified [place] using bilateral upper extremities  7. Lower extremity exercise program 2 sets x15 reps per handout, with independence                         Time Tracking:     PT Received On: 23  PT Start Time: 1147     PT Stop Time: 1225  PT Total Time (min): 38 min     Billable Minutes: Therapeutic Activity 13 min and Train/Wheelchair Management 25 min (co-treated with OT)    Co- treatment performed due to patient's multiple deficits requiring two skilled therapists to appropriately and safely assess patient's strength and endurance while facilitating functional tasks in addition to accommodating for patient's activity tolerance      Treatment Type: Treatment  PT/PTA: PTA     Number of PTA visits since last PT visit: 2     2023

## 2023-08-21 NOTE — PLAN OF CARE
Problem: Physical Therapy  Goal: Physical Therapy Goal  Description: Goals to be met by: 23     Patient will increase functional independence with mobility by performin. Supine to sit with Modified [place]  2. Rolling to Left and Right with Modified [place]  3. Sit to stand transfer with Modified [place] using RW  4. Bed to chair transfer with Modified [place]   5. Gait x50 feet with Modified [place] using Rolling Walker   6. Wheelchair propulsion x200 feet with Modified [place] using bilateral upper extremities  7. Lower extremity exercise program 2 sets x15 reps per handout, with independence    Outcome: Ongoing, Progressing

## 2023-08-21 NOTE — NURSING
"Plugged AVS's, called telesittkael Alexander, Said " they see pt on video. Side rails up. Bed in low position, wheels locked. Call light within reach.  "

## 2023-08-21 NOTE — PT/OT/SLP PROGRESS
Occupational Therapy   Treatment    Name: Sony Smith  MRN: [id number]  Admitting Diagnosis:  Alcohol withdrawal syndrome, with delirium       Recommendations:     Discharge Recommendations: rehabilitation facility  Discharge Equipment Recommendations:  other (see comments)  Barriers to discharge:  Other (Comment) (patient cannot ambulate and climb stairs and care for himself at this time)    Assessment:     Sony Smith is a 37 y.o. male with a medical diagnosis of Alcohol withdrawal syndrome, with delirium.  He presents with up in chair with sitter present and no pain noted with less tremors today. Performance deficits affecting function are weakness, impaired sensation, impaired endurance, impaired self care skills, impaired functional mobility, gait instability, impaired balance, impaired cognition, decreased coordination, decreased lower extremity function, decreased safety awareness, pain, decreased upper extremity function, abnormal tone, decreased ROM, impaired coordination, impaired fine motor.     Rehab Prognosis:  Good; patient would benefit from acute skilled OT services to address these deficits and reach maximum level of function.       Plan:     Patient to be seen 5 x/week to address the above listed problems via self-care/home management, neuromuscular re-education, therapeutic activities, therapeutic exercises  Plan of Care Expires: 08/29/23  Plan of Care Reviewed with: patient    Subjective     Chief Complaint:  weakness   Patient/Family Comments/goals: patient still disoriented to place   Pain/Comfort:  Pain Rating 1: 0/10    Objective:     Communicated with: YG Acosta, prior to session.  Patient found up in chair with peripheral IV, telemetry ([employer] and safety sitter) upon OT entry to room.    General Precautions: Standard, fall, DT    Orthopedic Precautions:N/A  Braces: N/A  Respiratory Status: Room air     Occupational Performance:     Bed Mobility:    Patient OOB up in chair before  and after session.      Functional Mobility/Transfers:  Patient completed Sit <> Stand Transfer with maximal assistance and of 2 persons  with  rolling walker   Patient completed Bed <> Chair Transfer using Squat Pivot technique with maximal assistance and of 2 persons with hand-held assist  Functional Mobility: Patient took 2-3 steps with PTA and occupational therapy assisting with RW to take 2-3 steps from chair to wheelchair with multiple verbal cues.     Activities of Daily Living:  Feeding:  stand by assistance to setup food, no foam handle needed today   Upper Body Dressing: stand by assistance to don outer gown   Lower Body Dressing: maximal assistance to don socks       [facility] 6 Click ADL: 15    Treatment & Education:  Patient needed multiple cues to perform sit to stand and bed to chair t/f's due to trunk extension leaning away from RW.     Patient left up in chair with all lines intact, call button in reach, RN notified, and sitter  present    GOALS:   Multidisciplinary Problems       Occupational Therapy Goals          Problem: Occupational Therapy    Goal Priority Disciplines Outcome Interventions   Occupational Therapy Goal     OT, PT/OT Ongoing, Progressing    Description: Goals to be met by: 8/29/23      Patient will increase functional independence with ADLs by performing:    UE Dressing with Modified [place].  LE Dressing with Stand-by Assistance.  Grooming while seated with Supervision.  Toileting from [facility] with with supervision for hygiene and clothing management.   Toilet transfer to [facility] with Minimal Assistance.  Supine<>sit with supervision  Step transfer with Minimal Assistance  Upper extremity exercise program x15 reps per handout, with independence   Manual w/c propulsion using BUE/BLE ~50 ft with modified independence                          Time Tracking:     OT Date of Treatment: 08/21/23  OT Start Time: 1155  OT Stop Time: 1225  OT Total Time (min): 30 min    Billable Minutes:Self  Care/Home Management 15  Therapeutic Activity 15     OT/JOHANNY: OT     Number of JOHANNY visits since last OT visit: 0    8/21/2023

## 2023-08-21 NOTE — PROGRESS NOTES
[facility] Medicine  Progress Note    Patient Name: Sony Smith  MRN: [id number]  Patient Class: IP- Inpatient   Admission Date: 8/14/2023  Length of Stay: 7 days  Attending Physician: Nathen Lopes MD  Primary Care Provider: Meghana, Primary Doctor        Subjective:     Principal Problem:Alcohol withdrawal syndrome, with delirium        HPI:  This is a 37 year old male with PMHx of alcohol abuse,who presents to the ED via EMS for chief complaint of Altered Mental Status onset this morning. Patient reports travelling for work and that he has been under stress and his symptoms began in [facility]. Additional history obtained by an independent historian: EMS personnel, notes that when EMS arrived patient was sitting on a lawn chair on the porch incoherent; patient's roommate stated that the patient is a recovering alcoholic and had 1 alcoholic beverage this morning as well as usually being coherent and able to ambulate. Patient was also noted to repeat himself and when asked when his symptoms started he stated they began in July. EMS further noted unsteady gait, and that the patient complained of bilateral forearm and lower extremity pain with edema.  Patient further denies cough, shortness of breath, chest pain, fever, chills, abdominal pain, nausea, vomiting, diarrhea, dysuria, headaches, congestion, sore throat, eye pain, ear pain, rash, or other associated symptoms. Patient denies recreational drug use;patient has elevated lactic acid 10.3,improved with bolus of IVF,he has hypokalemia, 2.9,replaced,CT head show no acute process,patient has lege edema,US legs show no DVT.he has proteinuria on UA.patient is admitted for inpatient status.      Overview/Hospital Course:  Mr Sony Smith was admitted with acute encephalopathy and lower extremity edema. Suspect associated with alcohol use. Started IV thiamine (Wernicke treatment protocol), folate, multivitamin, valium. Regarding lower extremity  edema, suspect associated with alcoholic hepatitis. Abdominal US no cirrhosis or ascites. TTE no CHF. Given lasix with improvement. PT, OT consulted. Mental status improved but then worsened on 8/16 with worsening withdrawal symptoms. Increased valium dosing to 20mg Q6h. Withdrawal symptoms improved. Weaning valium slowly. He remains confused.       Interval History: reports of disorientation last night, but currently oriented to self, location and somewhat situation. He is able to tell me history of alcohol use and drug use in the past.     Review of Systems   Neurological:  Negative for tremors.   Psychiatric/Behavioral:  Positive for confusion.      Objective:     Vital Signs (Most Recent):  Temp: 98.3 °F (36.8 °C) (08/21/23 0655)  Pulse: 91 (08/21/23 0655)  Resp: 18 (08/21/23 0655)  BP: 113/69 (08/21/23 0655)  SpO2: 98 % (08/21/23 0655) Vital Signs (24h Range):  Temp:  [97.5 °F (36.4 °C)-98.3 °F (36.8 °C)] 98.3 °F (36.8 °C)  Pulse:  [] 91  Resp:  [17-18] 18  SpO2:  [98 %-100 %] 98 %  BP: (109-124)/(65-75) 113/69     Weight: 67.2 kg (148 lb 2.4 oz)  Body mass index is 21.88 kg/m².    Intake/Output Summary (Last 24 hours) at 8/21/2023 0858  Last data filed at 8/21/2023 0543  Gross per 24 hour   Intake 580.12 ml   Output 950 ml   Net -369.88 ml           Physical Exam  Vitals and nursing note reviewed.   Constitutional:       General: He is not in acute distress.     Appearance: He is ill-appearing. He is not toxic-appearing.   HENT:      Head: Normocephalic and atraumatic.   Cardiovascular:      Rate and Rhythm: Normal rate and regular rhythm.   Pulmonary:      Effort: Pulmonary effort is normal.      Breath sounds: Normal breath sounds.      Comments: Room air  Musculoskeletal:      Right lower leg: No edema.      Left lower leg: No edema.   Skin:     General: Skin is warm and dry.             Significant Labs: All pertinent labs within the past 24 hours have been reviewed.    Significant Imaging: I have  reviewed all pertinent imaging results/findings within the past 24 hours.      Assessment/Plan:      * Alcohol withdrawal syndrome, with delirium  Suspect associated with alcohol withdrawal. CTH no acute changes. NH3 normal and abdominal US no cirrhosis.   - started IV thiamine (Wernicke's protocol), folate, multivitamin  - started valium  - worsening mental status on 8/16 consistent with worsening withdrawals- increased valium  - withdrawals improved  - now on valium to 15mg Q6- taper to 10 mg q6h today  - continue to monitor with CIWA q4    Debility  Very weak when working with PT, OT  - continue PT, OT  - unclear disposition on discharge       Folate deficiency  Supplement       Thrombocytopenia  Plts 113 on admit, no signs of bleeding. Most likely associated with alcohol abuse      Macrocytic anemia  Associated with alcohol abuse and folate deficiency  - supplement folate       Alcoholic hepatitis without ascites  No signs of cirrhosis. Not consistent with acute liver failure. Treating alcohol withdrawal. AST/ALT elevations due to alcoholic hepatitis.       Alcohol abuse  See above      Lower extremity edema  Suspect associated with alcoholic hepatitis. US Abdomen no cirrhosis. UA only 1+ protein, doubt nephrotic syndrome. TTE no CHF.   -resolved with lasix      VTE Risk Mitigation (From admission, onward)         Ordered     heparin (porcine) injection 5,000 Units  Every 8 hours         08/14/23 1449                Discharge Planning   ROMAIN: 8/25/2023     Code Status: Full Code   Is the patient medically ready for discharge?:     Reason for patient still in hospital (select all that apply): Treatment  Discharge Plan A: Home   Discharge Delays: None known at this time              Nathen Lopes MD  Department of Hospital Medicine   [facility] Surg

## 2023-08-22 PROCEDURE — 97535 SELF CARE MNGMENT TRAINING: CPT

## 2023-08-22 PROCEDURE — 63600175 PHARM REV CODE 636 W HCPCS: Performed by: HOSPITALIST

## 2023-08-22 PROCEDURE — 25000003 PHARM REV CODE 250: Performed by: STUDENT IN AN ORGANIZED HEALTH CARE EDUCATION/TRAINING PROGRAM

## 2023-08-22 PROCEDURE — 97116 GAIT TRAINING THERAPY: CPT | Mod: CQ

## 2023-08-22 PROCEDURE — 25000003 PHARM REV CODE 250: Performed by: HOSPITALIST

## 2023-08-22 PROCEDURE — 97110 THERAPEUTIC EXERCISES: CPT | Mod: CQ

## 2023-08-22 PROCEDURE — 11000001 HC ACUTE MED/SURG PRIVATE ROOM

## 2023-08-22 PROCEDURE — S4991 NICOTINE PATCH NONLEGEND: HCPCS | Performed by: HOSPITALIST

## 2023-08-22 PROCEDURE — 97110 THERAPEUTIC EXERCISES: CPT

## 2023-08-22 RX ORDER — DIAZEPAM 5 MG/1
10 TABLET ORAL EVERY 8 HOURS
Status: DISCONTINUED | OUTPATIENT
Start: 2023-08-22 | End: 2023-08-26

## 2023-08-22 RX ADMIN — DIAZEPAM 10 MG: 5 TABLET ORAL at 06:08

## 2023-08-22 RX ADMIN — HEPARIN SODIUM 5000 UNITS: 5000 INJECTION INTRAVENOUS; SUBCUTANEOUS at 10:08

## 2023-08-22 RX ADMIN — ACETAMINOPHEN 650 MG: 325 TABLET ORAL at 10:08

## 2023-08-22 RX ADMIN — THERA TABS 1 TABLET: TAB at 09:08

## 2023-08-22 RX ADMIN — HEPARIN SODIUM 5000 UNITS: 5000 INJECTION INTRAVENOUS; SUBCUTANEOUS at 06:08

## 2023-08-22 RX ADMIN — Medication 1 PATCH: at 09:08

## 2023-08-22 RX ADMIN — DIAZEPAM 10 MG: 5 TABLET ORAL at 12:08

## 2023-08-22 RX ADMIN — DIAZEPAM 10 MG: 5 TABLET ORAL at 10:08

## 2023-08-22 RX ADMIN — HEPARIN SODIUM 5000 UNITS: 5000 INJECTION INTRAVENOUS; SUBCUTANEOUS at 02:08

## 2023-08-22 RX ADMIN — FOLIC ACID 1 MG: 1 TABLET ORAL at 09:08

## 2023-08-22 NOTE — PLAN OF CARE
Problem: Adult Inpatient Plan of Care  Goal: Plan of Care Review  Outcome: Ongoing, Progressing  Goal: Patient-Specific Goal (Individualized)  Outcome: Ongoing, Progressing  Goal: Absence of Hospital-Acquired Illness or Injury  Outcome: Ongoing, Progressing  Goal: Optimal Comfort and Wellbeing  Outcome: Ongoing, Progressing  Goal: Readiness for Transition of Care  Outcome: Ongoing, Progressing     Problem: Fall Injury Risk  Goal: Absence of Fall and Fall-Related Injury  Outcome: Ongoing, Progressing     Problem: Alcohol Withdrawal  Goal: Alcohol Withdrawal Symptom Control  Outcome: Ongoing, Progressing     Problem: Skin Injury Risk Increased  Goal: Skin Health and Integrity  Outcome: Ongoing, Progressing

## 2023-08-22 NOTE — PLAN OF CARE
Case Management Re-Assessment     PCP: [facility]    [facility]: [facility]    Patient Arrived From: home  Existing Help at Home: FriendCarmine [phone]    Barriers to Discharge: Requires medical care.    Discharge Plan:    A. IPR   B. IPR      Lives with room mate Carmine Jacobs [phone]  and support from sister Florencia Umanzor [phone] living in [place].    Patient admitted alcohol abuse and altered mental status.  Patient is presently on a Valium taper.  PT is recommending IPR, patient requiring 2 person Max assist.      [facility] says cannot accept whole on a Valium taper.  [place] [phone] reviewing presently will call back.  [place] left a detailed message for Keke ERNEE [phone]. Keke called back will review patient and come see assess patient in person today.         08/22/23 0920   Discharge Reassessment   Assessment Type Discharge Planning Reassessment   Did the patient's condition or plan change since previous assessment? Yes   Discharge Plan discussed with: Friend;Sibling   Name(s) and Number(s) Florencia Umanzor (Sister)   [phone] (Mobile) Carmine Jacobs [phone] friend and room mate   Communicated ROMAIN with patient/caregiver No   Discharge Plan A Rehab   Discharge Plan B Rehab   DME Needed Upon Discharge  none   Transition of Care Barriers None   Why the patient remains in the hospital Requires continued medical care   Post-Acute Status   Post-Acute Authorization Placement   Post-Acute Placement Status Referrals Sent   Patient choice form signed by patient/caregiver List with quality metrics by geographic area provided   Discharge Delays (!) Post-Acute Set-up

## 2023-08-22 NOTE — NURSING
[facility] [place]  Nurses Note -- 4 Eyes      8/21/2023       Skin assessed on: Q Shift      [x] No Pressure Injuries Present    [x]Prevention Measures Documented    [] Yes LDA  for Pressure Injury Previously documented     [] Yes New Pressure Injury Discovered   [] LDA for New Pressure Injury Added      Attending RN:  Anna Suarez RN     Second RN:  NAV Acosta

## 2023-08-22 NOTE — PLAN OF CARE
Problem: Physical Therapy  Goal: Physical Therapy Goal  Description: Goals to be met by: 23     Patient will increase functional independence with mobility by performin. Supine to sit with Modified [place]  2. Rolling to Left and Right with Modified [place]  3. Sit to stand transfer with Modified [place] using RW  4. Bed to chair transfer with Modified [place]   5. Gait x50 feet with Modified [place] using Rolling Walker   6. Wheelchair propulsion x200 feet with Modified [place] using bilateral upper extremities  7. Lower extremity exercise program 2 sets x15 reps per handout, with independence    Outcome: Ongoing, Progressing

## 2023-08-22 NOTE — PROGRESS NOTES
[facility] Medicine  Progress Note    Patient Name: Sony Smith  MRN: [id number]  Patient Class: IP- Inpatient   Admission Date: 8/14/2023  Length of Stay: 8 days  Attending Physician: Nathen Lopes MD  Primary Care Provider: Meghana, Primary Doctor        Subjective:     Principal Problem:Alcohol withdrawal syndrome, with delirium        HPI:  This is a 37 year old male with PMHx of alcohol abuse,who presents to the ED via EMS for chief complaint of Altered Mental Status onset this morning. Patient reports travelling for work and that he has been under stress and his symptoms began in [facility]. Additional history obtained by an independent historian: EMS personnel, notes that when EMS arrived patient was sitting on a lawn chair on the porch incoherent; patient's roommate stated that the patient is a recovering alcoholic and had 1 alcoholic beverage this morning as well as usually being coherent and able to ambulate. Patient was also noted to repeat himself and when asked when his symptoms started he stated they began in July. EMS further noted unsteady gait, and that the patient complained of bilateral forearm and lower extremity pain with edema.  Patient further denies cough, shortness of breath, chest pain, fever, chills, abdominal pain, nausea, vomiting, diarrhea, dysuria, headaches, congestion, sore throat, eye pain, ear pain, rash, or other associated symptoms. Patient denies recreational drug use;patient has elevated lactic acid 10.3,improved with bolus of IVF,he has hypokalemia, 2.9,replaced,CT head show no acute process,patient has lege edema,US legs show no DVT.he has proteinuria on UA.patient is admitted for inpatient status.      Overview/Hospital Course:  Mr Sony Smith was admitted with acute encephalopathy and lower extremity edema. Suspect associated with alcohol use. Started IV thiamine (Wernicke treatment protocol), folate, multivitamin, valium. Regarding lower extremity  edema, suspect associated with alcoholic hepatitis. Abdominal US no cirrhosis or ascites. TTE no CHF. Given lasix with improvement. PT, OT consulted. Mental status improved but then worsened on 8/16 with worsening withdrawal symptoms. Increased valium dosing to 20mg Q6h. Withdrawal symptoms improved. Weaning valium slowly. Confusion is improving.       Interval History: alert and oriented today, asking to work with therapy    Review of Systems   Neurological:  Negative for tremors.     Objective:     Vital Signs (Most Recent):  Temp: 97.9 °F (36.6 °C) (08/22/23 1546)  Pulse: 92 (08/22/23 1546)  Resp: 18 (08/22/23 1546)  BP: 131/64 (08/22/23 1546)  SpO2: 100 % (08/22/23 1546) Vital Signs (24h Range):  Temp:  [97 °F (36.1 °C)-98.7 °F (37.1 °C)] 97.9 °F (36.6 °C)  Pulse:  [92-99] 92  Resp:  [17-18] 18  SpO2:  [97 %-100 %] 100 %  BP: (111-131)/(64-72) 131/64     Weight: 67.2 kg (148 lb 2.4 oz)  Body mass index is 21.88 kg/m².    Intake/Output Summary (Last 24 hours) at 8/22/2023 1604  Last data filed at 8/22/2023 1308  Gross per 24 hour   Intake 720 ml   Output 700 ml   Net 20 ml           Physical Exam  Vitals and nursing note reviewed.   Constitutional:       General: He is not in acute distress.     Appearance: He is ill-appearing. He is not toxic-appearing.   HENT:      Head: Normocephalic and atraumatic.   Cardiovascular:      Rate and Rhythm: Normal rate and regular rhythm.   Pulmonary:      Effort: Pulmonary effort is normal.      Breath sounds: Normal breath sounds.      Comments: Room air  Musculoskeletal:      Right lower leg: No edema.      Left lower leg: No edema.   Skin:     General: Skin is warm and dry.   Neurological:      Comments: Alert and oriented x3             Significant Labs: All pertinent labs within the past 24 hours have been reviewed.    Significant Imaging: I have reviewed all pertinent imaging results/findings within the past 24 hours.      Assessment/Plan:      * Alcohol withdrawal  syndrome, with delirium  Suspect associated with alcohol withdrawal. CTH no acute changes. NH3 normal and abdominal US no cirrhosis.   - started IV thiamine (Wernicke's protocol), folate, multivitamin  - started valium  - worsening mental status on 8/16 consistent with worsening withdrawals- increased valium  - withdrawals improved  - Valium taper to 10 mg q8h for 2 days (starting 8/22), then 5 mg q8h for 2 days then 5 mg q12 for 2 days then 5 mg daily for 2 days then stop  - continue to monitor with CIWA q4    Debility  Very weak when working with PT, OT  - continue PT, OT  - planning IPR for dispo      Folate deficiency  Supplement       Thrombocytopenia  Plts 113 on admit, no signs of bleeding. Most likely associated with alcohol abuse      Macrocytic anemia  Associated with alcohol abuse and folate deficiency  - supplement folate       Alcoholic hepatitis without ascites  No signs of cirrhosis. Not consistent with acute liver failure. Treating alcohol withdrawal. AST/ALT elevations due to alcoholic hepatitis.       Alcohol abuse  See above      Lower extremity edema  Suspect associated with alcoholic hepatitis. US Abdomen no cirrhosis. UA only 1+ protein, doubt nephrotic syndrome. TTE no CHF.   -resolved with lasix      VTE Risk Mitigation (From admission, onward)         Ordered     heparin (porcine) injection 5,000 Units  Every 8 hours         08/14/23 1449                Discharge Planning   ROMAIN: 8/25/2023     Code Status: Full Code   Is the patient medically ready for discharge?:     Reason for patient still in hospital (select all that apply): Treatment  Discharge Plan A: Rehab   Discharge Delays: (!) Post-Acute Set-up              Nathen Lopes MD  Department of Hospital Medicine   [facility] Surg

## 2023-08-22 NOTE — ASSESSMENT & PLAN NOTE
Suspect associated with alcohol withdrawal. CTH no acute changes. NH3 normal and abdominal US no cirrhosis.   - started IV thiamine (Wernicke's protocol), folate, multivitamin  - started valium  - worsening mental status on 8/16 consistent with worsening withdrawals- increased valium  - withdrawals improved  - Valium taper to 10 mg q8h for 2 days (starting 8/22), then 5 mg q8h for 2 days then 5 mg q12 for 2 days then 5 mg daily for 2 days then stop  - continue to monitor with CIWA q4

## 2023-08-22 NOTE — PLAN OF CARE
Problem: Occupational Therapy  Goal: Occupational Therapy Goal  Description: Goals to be met by: 8/29/23      Patient will increase functional independence with ADLs by performing:    UE Dressing with Modified [place].  LE Dressing with Stand-by Assistance.  Grooming while seated with Supervision.  Toileting from [facility] with with supervision for hygiene and clothing management.   Toilet transfer to [facility] with Minimal Assistance.  Supine<>sit with supervision  Step transfer with Minimal Assistance  Upper extremity exercise program x15 reps per handout, with independence   Manual w/c propulsion using BUE/BLE ~50 ft with modified independence     Outcome: Ongoing, Progressing   Patient completed 10 reps of AROM with triceps and biceps seated EOB.   He completed 2 reps of scapula protraction/retraction while standing with PTA and occupational therapy.

## 2023-08-22 NOTE — NURSING
[facility] [place]  Nurses Note -- 4 Eyes      8/22/2023       Skin assessed on: Q Shift      [x] No Pressure Injuries Present    [x]Prevention Measures Documented    [] Yes LDA  for Pressure Injury Previously documented     [] Yes New Pressure Injury Discovered   [] LDA for New Pressure Injury Added      Attending RN:  Kayla Bravo RN     Second RN:  YG Castillo

## 2023-08-22 NOTE — PT/OT/SLP PROGRESS
Physical Therapy Treatment    Patient Name:  Sony Smith   MRN:  [id number]    Recommendations:     Discharge Recommendations: rehabilitation facility  Discharge Equipment Recommendations:  (TBD)  Barriers to discharge:  Prior to admission patient was independent with mobility and self-care and there is expectation of returning to prior level of function to maintain independence avoiding readmission. Pt is at high risk of unplanned readmission due to fall risk and not being at prior level of function. The lower level of care cannot provide total interdisciplinary approach needed. Pt is able to tolerate 3 hours of daily therapy. Pt is pleasant and motivated to return to prior level of function.  Pt with multiple stairs at home.    Assessment:     Sony Smith is a 37 y.o. male admitted with a medical diagnosis of Alcohol withdrawal syndrome, with delirium.  He presents with the following impairments/functional limitations: weakness, impaired endurance, impaired self care skills, impaired functional mobility, gait instability, impaired balance, decreased upper extremity function, decreased lower extremity function, decreased safety awareness, impaired coordination.    Rehab Prognosis: Good; patient would benefit from acute skilled PT services to address these deficits and reach maximum level of function.    Recent Surgery: * No surgery found *      Plan:     During this hospitalization, patient to be seen 6 x/week to address the identified rehab impairments via gait training, therapeutic activities, therapeutic exercises and progress toward the following goals:    Plan of Care Expires:  08/29/23    Subjective     Chief Complaint: pt c/o feeling dizzy with position change with anterior scooting toward EOB and Standing activity  Patient/Family Comments/goals: Pt agreed to participate.  Pain/Comfort:  Pain Rating 1: 0/10  Pain Rating Post-Intervention 1: 0/10      Objective:     Communicated with nurse Aleman  prior to session.  Patient found sitting edge of bed with peripheral IV, Condom Catheter, telemetry upon PT entry to room.     General Precautions: Standard, fall  Orthopedic Precautions: N/A  Braces: N/A  Respiratory Status: Room air     Functional Mobility: Pt alert but confused. Pt was not able to state the correct day and month when asked. Pt still with body tremors and heavy posterior leaning in standing with Max A x 2 using RW.  Bed Mobility:     Scooting: anterior scoot to EOB with contact guard assistance  Transfers:  gait belt and back gown donned prior Standing   Sit to Stand: 4 trials from EOB with maximal assistance and of 2 persons with rolling walker; pt with c/o feeling dizzy upon standing up and requested to sit back down.  Bed to Chair: moderate assistance and of 2 persons with  hand-held assist using Squat Pivot  Gait: Pt took ~3 sidesteps x 2 trials along bedside toward L/R side with Max A x2 using RW. Pt with heavy posterior leaning, body tremors, decreased juno/step length/foot off floor clearance/weight shifting, v/t cues for upright posture, increase step length, clear foot off floor, weight shifting to take steps, AD management, sequencing, , proper hand placement, seated rest breaks between trials   Balance: Poor Standing  BP was monitored during session  In sitting 117/73  bpm  After 1st standing, in sitting 120/75  bpm  After 2nd standing in standing 130/78    AM-PAC 6 CLICK MOBILITY  Turning over in bed (including adjusting bedclothes, sheets and blankets)?: 4  Sitting down on and standing up from a chair with arms (e.g., wheelchair, bedside commode, etc.): 2  Moving from lying on back to sitting on the side of the bed?: 3  Moving to and from a bed to a chair (including a wheelchair)?: 2  Need to walk in hospital room?: 2  Climbing 3-5 steps with a railing?: 1  Basic Mobility Total Score: 14       Treatment & Education:  Re-educated pt on benefits of OOB activity  with hospital staff assistance and performing BLE ex throughout the day.    BLE ex in seated 10 reps LAQ, Marching, 20 reps AP    Patient left up in chair on cushion seat with tray table in front, all lines intact, call button in reach, nurse notified, and safety sitter present.    GOALS:   Multidisciplinary Problems       Physical Therapy Goals          Problem: Physical Therapy    Goal Priority Disciplines Outcome Goal Variances Interventions   Physical Therapy Goal     PT, PT/OT Ongoing, Progressing     Description: Goals to be met by: 23     Patient will increase functional independence with mobility by performin. Supine to sit with Modified [place]  2. Rolling to Left and Right with Modified [place]  3. Sit to stand transfer with Modified [place] using RW  4. Bed to chair transfer with Modified [place]   5. Gait x50 feet with Modified [place] using Rolling Walker   6. Wheelchair propulsion x200 feet with Modified [place] using bilateral upper extremities  7. Lower extremity exercise program 2 sets x15 reps per handout, with independence                         Time Tracking:     PT Received On: 23  PT Start Time: 1105     PT Stop Time: 1137  PT Total Time (min): 32 min     Billable Minutes: Gait Training 25 min and Therapeutic Exercise 17 min (Co-treated with OT)    Co- treatment performed due to patient's multiple deficits requiring two skilled therapists to appropriately and safely assess patient's strength and endurance while facilitating functional tasks in addition to accommodating for patient's activity tolerance        Treatment Type: Treatment  PT/PTA: PTA     Number of PTA visits since last PT visit: 3     2023

## 2023-08-22 NOTE — PT/OT/SLP PROGRESS
"Occupational Therapy   Treatment    Name: Sony Smith  MRN: [id number]  Admitting Diagnosis:  Alcohol withdrawal syndrome, with delirium       Recommendations:     Discharge Recommendations: rehabilitation facility  Discharge Equipment Recommendations:  other (see comments)  Barriers to discharge:  Other (Comment)    Assessment:     Sony Smith is a 37 y.o. male with a medical diagnosis of Alcohol withdrawal syndrome, with delirium.  He presents with HOB elevated with no pain noted, but having dizziness when seated and standing. Blood pressure at 117/53 seated EOB and then increased to 130/74 when standing. Performance deficits affecting function are weakness, impaired endurance, impaired sensation, impaired self care skills, impaired functional mobility, gait instability, impaired balance, impaired cognition, decreased coordination, decreased safety awareness, impaired coordination, impaired fine motor.     Rehab Prognosis:  Good; patient would benefit from acute skilled OT services to address these deficits and reach maximum level of function.       Plan:     Patient to be seen 5 x/week to address the above listed problems via self-care/home management, therapeutic activities, therapeutic exercises  Plan of Care Expires: 08/29/23  Plan of Care Reviewed with: patient    Subjective     Chief Complaint: dizziness   Patient/Family Comments/goals: " I am frustrated with myself."   Pain/Comfort:  Pain Rating 1: 0/10    Objective:     Communicated with: YG Aleman, prior to session.  Patient found HOB elevated with peripheral IV, Condom Catheter, telemetry upon OT entry to room.    General Precautions: Standard, fall, DT    Orthopedic Precautions:N/A  Braces: N/A  Respiratory Status: Room air     Occupational Performance:     Bed Mobility:    Patient completed Rolling/Turning to Left with  stand by assistance  Patient completed Scooting/Bridging with stand by assistance  Patient completed Supine to Sit with stand " by assistance     Functional Mobility/Transfers:  Patient completed Sit <> Stand Transfer with maximal assistance and of 2 persons  with  rolling walker   Patient completed Bed <> Chair Transfer using Squat Pivot technique with maximal assistance and of 2 persons with no assistive device  Functional Mobility: Patient took 3 steps x 2 trials total Left and Right along EOB with rest breaks to sit due to dizziness with PTA and occupational therapy present for safety with mod assist x 2     Activities of Daily Living:  Grooming: stand by assistance with brushing hair seated EOB   Upper Body Dressing: stand by assistance with donning outer gown seated EOB       [facility] 6 Click ADL: 15    Treatment & Education:  Occupational therapy educated him re: AROM B upper extremity with triceps/biceps x 10 reps x 2 sets while seated EOB  Occupational therapy also educated him re: scapula protraction/retraction when standing x 2 reps per trial (x2 trials standing)     Patient left up in chair with all lines intact, call button in reach, RN  notified, and PCT present    GOALS:   Multidisciplinary Problems       Occupational Therapy Goals          Problem: Occupational Therapy    Goal Priority Disciplines Outcome Interventions   Occupational Therapy Goal     OT, PT/OT Ongoing, Progressing    Description: Goals to be met by: 8/29/23      Patient will increase functional independence with ADLs by performing:    UE Dressing with Modified [place].  LE Dressing with Stand-by Assistance.  Grooming while seated with Supervision.  Toileting from [facility] with with supervision for hygiene and clothing management.   Toilet transfer to [facility] with Minimal Assistance.  Supine<>sit with supervision  Step transfer with Minimal Assistance  Upper extremity exercise program x15 reps per handout, with independence   Manual w/c propulsion using BUE/BLE ~50 ft with modified independence                          Time Tracking:     OT Date of Treatment:  08/22/23  OT Start Time: 1105  OT Stop Time: 1137  OT Total Time (min): 32 min    Billable Minutes:Self Care/Home Management 15  Therapeutic Exercise 17    OT/JOHANNY: OT     Number of JOHANNY visits since last OT visit: 0      Co-treat with PTA   8/22/2023

## 2023-08-22 NOTE — PLAN OF CARE
Problem: Adult Inpatient Plan of Care  Goal: Plan of Care Review  Outcome: Ongoing, Progressing  Goal: Patient-Specific Goal (Individualized)  Outcome: Ongoing, Progressing  Goal: Absence of Hospital-Acquired Illness or Injury  Outcome: Ongoing, Progressing  Goal: Optimal Comfort and Wellbeing  Outcome: Ongoing, Progressing  Goal: Readiness for Transition of Care  Outcome: Ongoing, Progressing     Problem: Fall Injury Risk  Goal: Absence of Fall and Fall-Related Injury  Outcome: Ongoing, Progressing     Problem: Skin Injury Risk Increased  Goal: Skin Health and Integrity  Outcome: Ongoing, Progressing     Problem: Alcohol Withdrawal  Goal: Alcohol Withdrawal Symptom Control  Outcome: Ongoing, Progressing

## 2023-08-22 NOTE — PLAN OF CARE
[facility], [place] [phone], has medically accepted the patient while still on Valium taper.  Will come to see patient shortly and submit for  [employer] auth.

## 2023-08-22 NOTE — SUBJECTIVE & OBJECTIVE
Interval History: alert and oriented today, asking to work with therapy    Review of Systems   Neurological:  Negative for tremors.     Objective:     Vital Signs (Most Recent):  Temp: 97.9 °F (36.6 °C) (08/22/23 1546)  Pulse: 92 (08/22/23 1546)  Resp: 18 (08/22/23 1546)  BP: 131/64 (08/22/23 1546)  SpO2: 100 % (08/22/23 1546) Vital Signs (24h Range):  Temp:  [97 °F (36.1 °C)-98.7 °F (37.1 °C)] 97.9 °F (36.6 °C)  Pulse:  [92-99] 92  Resp:  [17-18] 18  SpO2:  [97 %-100 %] 100 %  BP: (111-131)/(64-72) 131/64     Weight: 67.2 kg (148 lb 2.4 oz)  Body mass index is 21.88 kg/m².    Intake/Output Summary (Last 24 hours) at 8/22/2023 1604  Last data filed at 8/22/2023 1308  Gross per 24 hour   Intake 720 ml   Output 700 ml   Net 20 ml           Physical Exam  Vitals and nursing note reviewed.   Constitutional:       General: He is not in acute distress.     Appearance: He is ill-appearing. He is not toxic-appearing.   HENT:      Head: Normocephalic and atraumatic.   Cardiovascular:      Rate and Rhythm: Normal rate and regular rhythm.   Pulmonary:      Effort: Pulmonary effort is normal.      Breath sounds: Normal breath sounds.      Comments: Room air  Musculoskeletal:      Right lower leg: No edema.      Left lower leg: No edema.   Skin:     General: Skin is warm and dry.   Neurological:      Comments: Alert and oriented x3             Significant Labs: All pertinent labs within the past 24 hours have been reviewed.    Significant Imaging: I have reviewed all pertinent imaging results/findings within the past 24 hours.

## 2023-08-23 PROCEDURE — 97530 THERAPEUTIC ACTIVITIES: CPT

## 2023-08-23 PROCEDURE — 97116 GAIT TRAINING THERAPY: CPT

## 2023-08-23 PROCEDURE — 25000003 PHARM REV CODE 250: Performed by: HOSPITALIST

## 2023-08-23 PROCEDURE — S4991 NICOTINE PATCH NONLEGEND: HCPCS | Performed by: HOSPITALIST

## 2023-08-23 PROCEDURE — 97535 SELF CARE MNGMENT TRAINING: CPT

## 2023-08-23 PROCEDURE — 11000001 HC ACUTE MED/SURG PRIVATE ROOM

## 2023-08-23 PROCEDURE — 25000003 PHARM REV CODE 250: Performed by: STUDENT IN AN ORGANIZED HEALTH CARE EDUCATION/TRAINING PROGRAM

## 2023-08-23 PROCEDURE — 63600175 PHARM REV CODE 636 W HCPCS: Performed by: HOSPITALIST

## 2023-08-23 PROCEDURE — 97110 THERAPEUTIC EXERCISES: CPT

## 2023-08-23 RX ORDER — FOLIC ACID 1 MG/1
1 TABLET ORAL DAILY
Qty: 30 TABLET | Refills: 3
Start: 2023-08-24 | End: 2024-08-23

## 2023-08-23 RX ORDER — DIAZEPAM 5 MG/1
TABLET ORAL
Qty: 32 TABLET | Refills: 0 | Status: SHIPPED | OUTPATIENT
Start: 2023-08-23 | End: 2023-09-01

## 2023-08-23 RX ORDER — IBUPROFEN 200 MG
1 TABLET ORAL DAILY
Refills: 0
Start: 2023-08-24

## 2023-08-23 RX ADMIN — HEPARIN SODIUM 5000 UNITS: 5000 INJECTION INTRAVENOUS; SUBCUTANEOUS at 05:08

## 2023-08-23 RX ADMIN — DIAZEPAM 10 MG: 5 TABLET ORAL at 10:08

## 2023-08-23 RX ADMIN — DIAZEPAM 10 MG: 5 TABLET ORAL at 05:08

## 2023-08-23 RX ADMIN — Medication 1 PATCH: at 08:08

## 2023-08-23 RX ADMIN — DIAZEPAM 10 MG: 5 TABLET ORAL at 01:08

## 2023-08-23 RX ADMIN — HEPARIN SODIUM 5000 UNITS: 5000 INJECTION INTRAVENOUS; SUBCUTANEOUS at 10:08

## 2023-08-23 RX ADMIN — THERA TABS 1 TABLET: TAB at 10:08

## 2023-08-23 RX ADMIN — FOLIC ACID 1 MG: 1 TABLET ORAL at 10:08

## 2023-08-23 RX ADMIN — HEPARIN SODIUM 5000 UNITS: 5000 INJECTION INTRAVENOUS; SUBCUTANEOUS at 01:08

## 2023-08-23 NOTE — NURSING
[facility] [place]  Nurses Note -- 4 Eyes      8/23/2023       Skin assessed on: Q Shift      [x] No Pressure Injuries Present    []Prevention Measures Documented    [] Yes LDA  for Pressure Injury Previously documented     [] Yes New Pressure Injury Discovered   [] LDA for New Pressure Injury Added      Attending RN:  Kayla Bravo RN     Second RN:  Bipin Hatfield

## 2023-08-23 NOTE — PLAN OF CARE
"  [facility]    FACILITY TRANSFER ORDERS      Patient Name: Sony Smith  YOB: 1985    PCP: No, Primary Doctor   PCP Address: None  PCP Phone Number: None  PCP Fax: None    Encounter Date: 08/23/2023    Admit to: [facility]    Vital Signs:  Routine    Diagnoses:   Active Hospital Problems    Diagnosis  POA    *Alcohol withdrawal syndrome, with delirium [F10.931]  Yes    Debility [R53.81]  Yes    Macrocytic anemia [D53.9]  Yes    Thrombocytopenia [D69.6]  No    Folate deficiency [E53.8]  Yes    Lower extremity edema [R60.0]  Yes    Alcohol abuse [F10.10]  Yes    Alcoholic hepatitis without ascites [K70.10]  Yes      Resolved Hospital Problems    Diagnosis Date Resolved POA    Lactic acid acidosis [E87.20] 08/15/2023 Yes    Hypokalemia [E87.6] 08/17/2023 Yes       Allergies:  Review of patient's allergies indicates:   Allergen Reactions    Nsaids (non-steroidal anti-inflammatory drug) Swelling     " my face swells"    Cyclobenzaprine        Diet: regular diet    Activities: Activity as tolerated    Goals of Care Treatment Preferences:  Code Status: Full Code      Nursing: per facility protocol     Labs: per facility protocol    CONSULTS:    Physical Therapy to evaluate and treat. , Occupational Therapy to evaluate and treat., and  to evaluate for community resources/long-range planning.    MISCELLANEOUS CARE:  N/a    WOUND CARE ORDERS  N/a    Medications: Review discharge medications with patient and family and provide education.      Current Discharge Medication List        START taking these medications    Details   diazePAM (VALIUM) 5 MG tablet Take 2 tablets (10 mg total) by mouth every 8 (eight) hours for 3 days, THEN 2 tablets (10 mg total) every 12 (twelve) hours for 2 days, THEN 1 tablet (5 mg total) every 12 (twelve) hours for 2 days, THEN 1 tablet (5 mg total) once daily for 2 days.  Qty: 32 tablet, Refills: 0      folic acid (FOLVITE) 1 MG tablet Take 1 " tablet (1 mg total) by mouth once daily.  Qty: 30 tablet, Refills: 3      multivitamin Tab Take 1 tablet by mouth once daily.      nicotine (NICODERM CQ) 21 mg/24 hr Place 1 patch onto the skin once daily.  Refills: 0           STOP taking these medications       furosemide (LASIX) 40 MG tablet Comments:   Reason for Stopping:                  Immunizations Administered as of 8/23/2023       Name Date Dose VIS Date Route Exp Date    COVID-19, MRNA, LN-S, PF (Pfizer) (Purple Cap) 2/16/2021 0.3 mL -- -- --    Lot: [id number]     External: Auto Reconciled From Outside Source     COVID-19, MRNA, LN-S, PF (Pfizer) (Purple Cap) 1/26/2021 0.3 mL -- -- --    Lot: [id number]     External: Auto Reconciled From Outside Source               _________________________________  Nathen Lopes MD  08/23/2023

## 2023-08-23 NOTE — PT/OT/SLP PROGRESS
Physical Therapy Treatment/6th visit    Patient Name:  Sony Smith   MRN:  [id number]    Recommendations:     Discharge Recommendations: rehabilitation facility  Discharge Equipment Recommendations:  (TBD)  Barriers to discharge: Prior to admission patient was independent with mobility and self-care and there is expectation of returning to prior level of function to maintain independence avoiding readmission. Pt is at high risk of unplanned readmission due to fall risk and not being at prior level of function. The lower level of care cannot provide total interdisciplinary approach needed. Pt is able to tolerate 3 hours of daily therapy. Pt is pleasant and motivated to return to prior level of function.  Pt with multiple stairs at home.    Assessment:     Sony Smith is a 37 y.o. male admitted with a medical diagnosis of Alcohol withdrawal syndrome, with delirium.  He presents with the following impairments/functional limitations: weakness, impaired endurance, impaired self care skills, impaired functional mobility, gait instability, impaired balance, decreased upper extremity function, decreased lower extremity function, decreased safety awareness, impaired coordination.    Rehab Prognosis: Good; patient would benefit from acute skilled PT services to address these deficits and reach maximum level of function.    Recent Surgery: * No surgery found *      Plan:     During this hospitalization, patient to be seen 5 x/week to address the identified rehab impairments via gait training, therapeutic activities, therapeutic exercises and progress toward the following goals:    Plan of Care Expires:  08/29/23    Subjective     Chief Complaint: weakness   Patient/Family Comments/goals: Pt agreeable to therapy.   Pain/Comfort:  Pain Rating 1: 0/10      Objective:     Communicated with nurse Aleman present in room prior to session, pt wanted to be up in chair for lunch.  Patient found HOB elevated with peripheral IV,  telemetry ([employer] monitor) upon PT entry to room.     General Precautions: Standard, fall  Orthopedic Precautions: N/A  Braces: N/A  Respiratory Status: Room air     Functional Mobility: Pt was alert, still with confusion and delayed initiation.  Pt with mild tremors upon standing.  LE edema much improved, c/o minimal numbness to B toes.  Pt still with difficulty ambulating at this time.    Bed Mobility:     Rolling Right: minimum assistance with min VC's and increased time  Scooting: stand by assistance and contact guard assistance and increased time  Supine to Sit: moderate assistance with HOB elevated and increased time  Transfers:     Sit to Stand: moderate assistance and of 2 persons with rolling walker  Bed to Chair: maximal assistance and of 2 persons with  rolling walker  using  Step Transfer  Gait: Pt ambulated ~10 small steps from bed>chair with max A of 2 persons using RW.  Pt with posterior trunk lean, decreased initiation, decreased weight shifting, decreased foot clearance, decreased step length, and MAX decreased juno.    Balance: Pt with fair static sit balance and poor static stand balance.       AM-PAC 6 CLICK MOBILITY  Turning over in bed (including adjusting bedclothes, sheets and blankets)?: 3  Sitting down on and standing up from a chair with arms (e.g., wheelchair, bedside commode, etc.): 2  Moving from lying on back to sitting on the side of the bed?: 2  Moving to and from a bed to a chair (including a wheelchair)?: 2  Need to walk in hospital room?: 2  Climbing 3-5 steps with a railing?: 1  Basic Mobility Total Score: 12       Treatment & Education:  BLE seated therex x10 reps: AP, LAQ, and hip flex.  Pt did not recall doing any LE therex.   B calf stretches  PROM to B knee extension    Pt encouraged to perform seated LE therex when up in chair.  Pt educated on supine QS in bed to encourage knee extension and calf stretches.  Pt verbalized understanding, however will need  reinforcement.    Patient left up in chair on seat cushion with all lines intact, call button in reach, nurse Love notified, and [employer] monitor present.  Lunch tray set-up for pt.     GOALS:   Multidisciplinary Problems       Physical Therapy Goals          Problem: Physical Therapy    Goal Priority Disciplines Outcome Goal Variances Interventions   Physical Therapy Goal     PT, PT/OT Ongoing, Progressing     Description: Goals to be met by: 23     Patient will increase functional independence with mobility by performin. Supine to sit with Modified [place]  2. Rolling to Left and Right with Modified [place]  3. Sit to stand transfer with Modified [place] using RW  4. Bed to chair transfer with Modified [place]   5. Gait x50 feet with Modified [place] using Rolling Walker   6. Wheelchair propulsion x200 feet with Modified [place] using bilateral upper extremities  7. Lower extremity exercise program 2 sets x15 reps per handout, with independence                         Time Tracking:     PT Received On: 23  PT Start Time: 1209     PT Stop Time: 1235  PT Total Time (min): 26 min     Billable Minutes: Gait Training 13 min and Therapeutic Exercise 13 min co-eval with OT    Treatment Type: 6th Visit  PT/PTA: PT     Number of PTA visits since last PT visit: 0     2023

## 2023-08-23 NOTE — PROGRESS NOTES
[facility] Medicine  Progress Note    Patient Name: Sony Smith  MRN: [id number]  Patient Class: IP- Inpatient   Admission Date: 8/14/2023  Length of Stay: 9 days  Attending Physician: Nathen Lopes MD  Primary Care Provider: Meghana, Primary Doctor        Subjective:     Principal Problem:Alcohol withdrawal syndrome, with delirium        HPI:  This is a 37 year old male with PMHx of alcohol abuse,who presents to the ED via EMS for chief complaint of Altered Mental Status onset this morning. Patient reports travelling for work and that he has been under stress and his symptoms began in [place]. Additional history obtained by an independent historian: EMS personnel, notes that when EMS arrived patient was sitting on a lawn chair on the porch incoherent; patient's roommate stated that the patient is a recovering alcoholic and had 1 alcoholic beverage this morning as well as usually being coherent and able to ambulate. Patient was also noted to repeat himself and when asked when his symptoms started he stated they began in July. EMS further noted unsteady gait, and that the patient complained of bilateral forearm and lower extremity pain with edema.  Patient further denies cough, shortness of breath, chest pain, fever, chills, abdominal pain, nausea, vomiting, diarrhea, dysuria, headaches, congestion, sore throat, eye pain, ear pain, rash, or other associated symptoms. Patient denies recreational drug use;patient has elevated lactic acid 10.3,improved with bolus of IVF,he has hypokalemia, 2.9,replaced,CT head show no acute process,patient has lege edema,US legs show no DVT.he has proteinuria on UA.patient is admitted for inpatient status.      Overview/Hospital Course:  Mr Sony Smith was admitted with acute encephalopathy and lower extremity edema. Suspect associated with alcohol use. Started IV thiamine (Wernicke treatment protocol), folate, multivitamin, valium. Regarding lower extremity  edema, suspect associated with alcoholic hepatitis. Abdominal US no cirrhosis or ascites. TTE no CHF. Given lasix with improvement. PT, OT consulted. Mental status improved but then worsened on 8/16 with worsening withdrawal symptoms. Increased valium dosing to 20mg Q6h. Withdrawal symptoms improved. Weaning valium slowly. Confusion is improving.       Interval History:  Alert and oriented, mental status appears to be improving.  Still very weak.  Updated sister.  Medically stable for discharge to inpatient rehab.    Review of Systems  Objective:     Vital Signs (Most Recent):  Temp: 97.6 °F (36.4 °C) (08/23/23 0744)  Pulse: 88 (08/23/23 0744)  Resp: 14 (08/23/23 0744)  BP: 120/78 (08/23/23 0744)  SpO2: 99 % (08/23/23 0744) Vital Signs (24h Range):  Temp:  [97.6 °F (36.4 °C)-98 °F (36.7 °C)] 97.6 °F (36.4 °C)  Pulse:  [88-95] 88  Resp:  [14-18] 14  SpO2:  [98 %-100 %] 99 %  BP: (120-135)/(64-82) 120/78     Weight: 67.2 kg (148 lb 2.4 oz)  Body mass index is 21.88 kg/m².    Intake/Output Summary (Last 24 hours) at 8/23/2023 1508  Last data filed at 8/23/2023 1439  Gross per 24 hour   Intake 480 ml   Output 850 ml   Net -370 ml           Physical Exam  Vitals and nursing note reviewed.   Constitutional:       General: He is not in acute distress.     Appearance: He is ill-appearing. He is not toxic-appearing.   HENT:      Head: Normocephalic and atraumatic.   Cardiovascular:      Rate and Rhythm: Normal rate and regular rhythm.   Pulmonary:      Effort: Pulmonary effort is normal.      Breath sounds: Normal breath sounds.      Comments: Room air  Musculoskeletal:      Right lower leg: No edema.      Left lower leg: No edema.   Skin:     General: Skin is warm and dry.   Neurological:      Comments: Alert and oriented x3             Significant Labs: All pertinent labs within the past 24 hours have been reviewed.    Significant Imaging: I have reviewed all pertinent imaging results/findings within the past 24  hours.      Assessment/Plan:      * Alcohol withdrawal syndrome, with delirium  Suspect associated with alcohol withdrawal. CTH no acute changes. NH3 normal and abdominal US no cirrhosis.   - started IV thiamine (Wernicke's protocol), folate, multivitamin  - started valium  - worsening mental status on 8/16 consistent with worsening withdrawals- increased valium  - withdrawals improved  - Valium taper   - continue to monitor with CIWA q4    Debility  Very weak when working with PT, OT  - continue PT, OT  - planning IPR for dispo      Folate deficiency  Supplement       Thrombocytopenia  Plts 113 on admit, no signs of bleeding. Most likely associated with alcohol abuse      Macrocytic anemia  Associated with alcohol abuse and folate deficiency  - supplement folate       Alcoholic hepatitis without ascites  No signs of cirrhosis. Not consistent with acute liver failure. Treating alcohol withdrawal. AST/ALT elevations due to alcoholic hepatitis.       Alcohol abuse  See above      Lower extremity edema  Suspect associated with alcoholic hepatitis. US Abdomen no cirrhosis. UA only 1+ protein, doubt nephrotic syndrome. TTE no CHF.   -resolved with lasix      VTE Risk Mitigation (From admission, onward)         Ordered     heparin (porcine) injection 5,000 Units  Every 8 hours         08/14/23 1449                Discharge Planning   ROMAIN: 8/23/2023     Code Status: Full Code   Is the patient medically ready for discharge?:     Reason for patient still in hospital (select all that apply): Treatment and Pending disposition  Discharge Plan A: Rehab   Discharge Delays: (!) Post-Acute Set-up              Nathen Lopes MD  Department of Hospital Medicine   [facility]

## 2023-08-23 NOTE — ASSESSMENT & PLAN NOTE
Suspect associated with alcohol withdrawal. CTH no acute changes. NH3 normal and abdominal US no cirrhosis.   - started IV thiamine (Wernicke's protocol), folate, multivitamin  - started valium  - worsening mental status on 8/16 consistent with worsening withdrawals- increased valium  - withdrawals improved  - Valium taper   - continue to monitor with CIWA q4

## 2023-08-23 NOTE — PLAN OF CARE
Plan for discharge to [facility] for inpatient rehab. Spoke with Keke with [facility], stated ins auth has been submitted. Pt will be sitter free for 24 hours as of 3pm today then clear to discharge to [facility], pending ins auth.     Plan B discharge to SNF. Multiple referrals sent via care port for review.     2:13pm Spoke with Keke, stated awaiting ins auth. TN to continue to follow.    08/23/23 0941   Post-Acute Status   Post-Acute Authorization Placement   Post-Acute Placement Status Pending payor review/awaiting authorization (if required)   Discharge Delays (!) Post-Acute Set-up   Discharge Plan   Discharge Plan A Rehab   Discharge Plan B Home

## 2023-08-23 NOTE — PLAN OF CARE
Problem: Fall Injury Risk  Goal: Absence of Fall and Fall-Related Injury  8/23/2023 1732 by Mayra Kessler, RN  Outcome: Ongoing, Progressing  8/23/2023 1728 by Mayra Kessler, RN  Outcome: Ongoing, Not Progressing  Intervention: Promote Injury-Free Environment  Flowsheets (Taken 8/23/2023 1732)  Safety Promotion/Fall Prevention:   bed alarm set   nonskid shoes/socks when out of bed   instructed to call staff for mobility

## 2023-08-23 NOTE — SUBJECTIVE & OBJECTIVE
Interval History:  Alert and oriented, mental status appears to be improving.  Still very weak.  Updated sister.  Medically stable for discharge to inpatient rehab.    Review of Systems  Objective:     Vital Signs (Most Recent):  Temp: 97.6 °F (36.4 °C) (08/23/23 0744)  Pulse: 88 (08/23/23 0744)  Resp: 14 (08/23/23 0744)  BP: 120/78 (08/23/23 0744)  SpO2: 99 % (08/23/23 0744) Vital Signs (24h Range):  Temp:  [97.6 °F (36.4 °C)-98 °F (36.7 °C)] 97.6 °F (36.4 °C)  Pulse:  [88-95] 88  Resp:  [14-18] 14  SpO2:  [98 %-100 %] 99 %  BP: (120-135)/(64-82) 120/78     Weight: 67.2 kg (148 lb 2.4 oz)  Body mass index is 21.88 kg/m².    Intake/Output Summary (Last 24 hours) at 8/23/2023 1508  Last data filed at 8/23/2023 1439  Gross per 24 hour   Intake 480 ml   Output 850 ml   Net -370 ml           Physical Exam  Vitals and nursing note reviewed.   Constitutional:       General: He is not in acute distress.     Appearance: He is ill-appearing. He is not toxic-appearing.   HENT:      Head: Normocephalic and atraumatic.   Cardiovascular:      Rate and Rhythm: Normal rate and regular rhythm.   Pulmonary:      Effort: Pulmonary effort is normal.      Breath sounds: Normal breath sounds.      Comments: Room air  Musculoskeletal:      Right lower leg: No edema.      Left lower leg: No edema.   Skin:     General: Skin is warm and dry.   Neurological:      Comments: Alert and oriented x3             Significant Labs: All pertinent labs within the past 24 hours have been reviewed.    Significant Imaging: I have reviewed all pertinent imaging results/findings within the past 24 hours.

## 2023-08-23 NOTE — NURSING
[facility] [place]  Nurses Note -- 4 Eyes      8/23/2023       Skin assessed on: Q Shift      [x] No Pressure Injuries Present    [x]Prevention Measures Documented    [] Yes LDA  for Pressure Injury Previously documented     [] Yes New Pressure Injury Discovered   [] LDA for New Pressure Injury Added      Attending RN:  Alysia Garvey RN     Second RN:  Kayla Bravo RN

## 2023-08-23 NOTE — PT/OT/SLP PROGRESS
Occupational Therapy   Treatment    Name: Sony Smith  MRN: [id number]  Admitting Diagnosis:  Alcohol withdrawal syndrome, with delirium       Recommendations:     Discharge Recommendations: rehabilitation facility  Discharge Equipment Recommendations:  other (see comments)  Barriers to discharge:  Other (Comment)    Assessment:     Sony Smith is a 37 y.o. male with a medical diagnosis of Alcohol withdrawal syndrome, with delirium.  He presents with HOB elevated. Performance deficits affecting function are weakness, impaired endurance, impaired sensation, impaired self care skills, impaired functional mobility, gait instability, impaired balance, impaired cognition, decreased coordination, decreased upper extremity function, decreased lower extremity function, decreased safety awareness.     Rehab Prognosis:  Good; patient would benefit from acute skilled OT services to address these deficits and reach maximum level of function.       Plan:     Patient to be seen 5 x/week to address the above listed problems via self-care/home management, therapeutic activities, therapeutic exercises  Plan of Care Expires: 08/29/23  Plan of Care Reviewed with: patient    Subjective     Chief Complaint: weakness   Patient/Family Comments/goals: to stand   Pain/Comfort:  Pain Rating 1: 0/10    Objective:     Communicated with: RNLove,  prior to session.  Patient found HOB elevated with peripheral IV, Condom Catheter, telemetry upon OT entry to room.    General Precautions: Standard, fall, DT    Orthopedic Precautions:N/A  Braces: N/A  Respiratory Status: Room air     Occupational Performance:     Bed Mobility:    Patient completed Rolling/Turning to Right with minimum assistance  Patient completed Scooting/Bridging with minimum assistance  Patient completed Supine to Sit with minimum assistance     Functional Mobility/Transfers:  Patient completed Sit <> Stand Transfer with maximal assistance and of 2  persons  with   rolling walker   Patient completed Bed <> Chair Transfer using Step Transfer technique with maximal assistance and of 2  persons with rolling walker  Functional Mobility: Patient took ~ 5 steps from bed to chair with RW with max assist x 2 for PT and occupational therapy to provide support, verbal cues, and move RW for him while turning.     Activities of Daily Living:  Upper Body Dressing: stand by assistance to don outer gown       [facility] 6 Click ADL: 16    Treatment & Education:  Occupational therapy educated him to do scapula protraction/retraction while standing and seated to improve postural support.     Patient left up in chair with all lines intact, call button in reach, RN  notified, and PCT present    GOALS:   Multidisciplinary Problems       Occupational Therapy Goals          Problem: Occupational Therapy    Goal Priority Disciplines Outcome Interventions   Occupational Therapy Goal     OT, PT/OT Ongoing, Progressing    Description: Goals to be met by: 8/29/23      Patient will increase functional independence with ADLs by performing:    UE Dressing with Modified [place].  LE Dressing with Stand-by Assistance.  Grooming while seated with Supervision.  [place] [facility] [place] with supervision for hygiene and clothing management.   Toilet transfer to [facility] with Minimal Assistance.  Supine<>sit with supervision  Step transfer with Minimal Assistance  Upper extremity exercise program x15 reps per handout, with independence   Manual w/c propulsion using BUE/BLE ~50 ft with modified independence                          Time Tracking:     OT Date of Treatment: 08/23/23  OT Start Time: 1206  OT Stop Time: 1229  OT Total Time (min): 23 min    Billable Minutes:Self Care/Home Management 15   Therapeutic Activity 8     OT/JOHANNY: OT     Number of JOHANNY visits since last OT visit: 0    Co-treat with PT   8/23/2023

## 2023-08-23 NOTE — PLAN OF CARE
Problem: Occupational Therapy  Goal: Occupational Therapy Goal  Description: Goals to be met by: 8/29/23      Patient will increase functional independence with ADLs by performing:    UE Dressing with Modified [place].  LE Dressing with Stand-by Assistance.  Grooming while seated with Supervision.  Toileting from [facility] with with supervision for hygiene and clothing management.   Toilet transfer to BS with Minimal Assistance.  Supine<>sit with supervision  Step transfer with Minimal Assistance  Upper extremity exercise program x15 reps per handout, with independence   Manual w/c propulsion using BUE/BLE ~50 ft with modified independence     Outcome: Ongoing, Progressing

## 2023-08-24 PROCEDURE — 97530 THERAPEUTIC ACTIVITIES: CPT | Mod: CQ

## 2023-08-24 PROCEDURE — 25000003 PHARM REV CODE 250: Performed by: HOSPITALIST

## 2023-08-24 PROCEDURE — 63600175 PHARM REV CODE 636 W HCPCS: Performed by: HOSPITALIST

## 2023-08-24 PROCEDURE — 97110 THERAPEUTIC EXERCISES: CPT

## 2023-08-24 PROCEDURE — 97110 THERAPEUTIC EXERCISES: CPT | Mod: CQ

## 2023-08-24 PROCEDURE — 25000003 PHARM REV CODE 250: Performed by: STUDENT IN AN ORGANIZED HEALTH CARE EDUCATION/TRAINING PROGRAM

## 2023-08-24 PROCEDURE — 97535 SELF CARE MNGMENT TRAINING: CPT

## 2023-08-24 PROCEDURE — 11000001 HC ACUTE MED/SURG PRIVATE ROOM

## 2023-08-24 PROCEDURE — S4991 NICOTINE PATCH NONLEGEND: HCPCS | Performed by: HOSPITALIST

## 2023-08-24 PROCEDURE — 97530 THERAPEUTIC ACTIVITIES: CPT

## 2023-08-24 PROCEDURE — 97116 GAIT TRAINING THERAPY: CPT | Mod: CQ

## 2023-08-24 RX ORDER — LANOLIN ALCOHOL/MO/W.PET/CERES
100 CREAM (GRAM) TOPICAL DAILY
Status: DISCONTINUED | OUTPATIENT
Start: 2023-08-24 | End: 2023-08-28 | Stop reason: HOSPADM

## 2023-08-24 RX ADMIN — FOLIC ACID 1 MG: 1 TABLET ORAL at 09:08

## 2023-08-24 RX ADMIN — THERA TABS 1 TABLET: TAB at 09:08

## 2023-08-24 RX ADMIN — DIAZEPAM 10 MG: 5 TABLET ORAL at 01:08

## 2023-08-24 RX ADMIN — DIAZEPAM 10 MG: 5 TABLET ORAL at 09:08

## 2023-08-24 RX ADMIN — HEPARIN SODIUM 5000 UNITS: 5000 INJECTION INTRAVENOUS; SUBCUTANEOUS at 06:08

## 2023-08-24 RX ADMIN — HEPARIN SODIUM 5000 UNITS: 5000 INJECTION INTRAVENOUS; SUBCUTANEOUS at 01:08

## 2023-08-24 RX ADMIN — DIAZEPAM 10 MG: 5 TABLET ORAL at 06:08

## 2023-08-24 RX ADMIN — THIAMINE HCL TAB 100 MG 100 MG: 100 TAB at 01:08

## 2023-08-24 RX ADMIN — Medication 1 PATCH: at 09:08

## 2023-08-24 RX ADMIN — HEPARIN SODIUM 5000 UNITS: 5000 INJECTION INTRAVENOUS; SUBCUTANEOUS at 09:08

## 2023-08-24 NOTE — PLAN OF CARE
Problem: Adult Inpatient Plan of Care  Goal: Plan of Care Review       Problem: Fall Injury Risk  Goal: Absence of Fall and Fall-Related Injury  Outcome: Ongoing, Progressing     Problem: Alcohol Withdrawal  Goal: Alcohol Withdrawal Symptom Control  Outcome: Ongoing, Progressing     Problem: Skin Injury Risk Increased  Goal: Skin Health and Integrity  Outcome: Ongoing, Progressing     Problem: Adjustment to Illness (Delirium)  Goal: Optimal Coping  Outcome: Ongoing, Progressing     Problem: Altered Behavior (Delirium)  Goal: Improved Behavioral Control  Outcome: Ongoing, Progressing     Problem: Attention and Thought Clarity Impairment (Delirium)  Goal: Improved Attention and Thought Clarity  Outcome: Ongoing, Progressing

## 2023-08-24 NOTE — PLAN OF CARE
Problem: Adult Inpatient Plan of Care  Goal: Plan of Care Review  8/24/2023 1639 by Salma Davis RN  Outcome: Ongoing, Progressing  8/24/2023 1639 by Salma Davis RN  Outcome: Ongoing, Progressing  Goal: Patient-Specific Goal (Individualized)  8/24/2023 1639 by Salma Davis RN  Outcome: Ongoing, Progressing  8/24/2023 1639 by Salma Davis RN  Outcome: Ongoing, Progressing  Goal: Absence of Hospital-Acquired Illness or Injury  8/24/2023 1639 by Salma Davis RN  Outcome: Ongoing, Progressing  8/24/2023 1639 by Salma Davis RN  Outcome: Ongoing, Progressing  Goal: Optimal Comfort and Wellbeing  8/24/2023 1639 by Salma Davis RN  Outcome: Ongoing, Progressing  8/24/2023 1639 by Salma Davis RN  Outcome: Ongoing, Progressing  Goal: Readiness for Transition of Care  8/24/2023 1639 by Salma Davis RN  Outcome: Ongoing, Progressing  8/24/2023 1639 by Salma Davis RN  Outcome: Ongoing, Progressing     Problem: Fall Injury Risk  Goal: Absence of Fall and Fall-Related Injury  8/24/2023 1639 by Salma Davis RN  Outcome: Ongoing, Progressing  8/24/2023 1639 by Salma Davis RN  Outcome: Ongoing, Progressing     Problem: Alcohol Withdrawal  Goal: Alcohol Withdrawal Symptom Control  8/24/2023 1639 by Salma Davis RN  Outcome: Ongoing, Progressing  8/24/2023 1639 by Salma Davis RN  Outcome: Ongoing, Progressing     Problem: Skin Injury Risk Increased  Goal: Skin Health and Integrity  8/24/2023 1639 by Salma Davis, YG  Outcome: Ongoing, Progressing  8/24/2023 1639 by Salma Davis RN  Outcome: Ongoing, Progressing     Problem: Adjustment to Illness (Delirium)  Goal: Optimal Coping  8/24/2023 1639 by Salma Davis RN  Outcome: Ongoing, Progressing  8/24/2023 1639 by Salma Davis RN  Outcome: Ongoing, Progressing     Problem: Altered Behavior (Delirium)  Goal: Improved Behavioral Control  8/24/2023 1639 by Salma Davis,  RN  Outcome: Ongoing, Progressing  8/24/2023 1639 by Salma Davis RN  Outcome: Ongoing, Progressing     Problem: Attention and Thought Clarity Impairment (Delirium)  Goal: Improved Attention and Thought Clarity  8/24/2023 1639 by Salma Davis RN  Outcome: Ongoing, Progressing  8/24/2023 1639 by Salma Davis RN  Outcome: Ongoing, Progressing     Problem: Sleep Disturbance (Delirium)  Goal: Improved Sleep  8/24/2023 1639 by Salma Davis RN  Outcome: Ongoing, Progressing  8/24/2023 1639 by Salma Davis RN  Outcome: Ongoing, Progressing

## 2023-08-24 NOTE — NURSING
Pt attempted to get out of bed. Pt stated its year 2006. Redirect pt. Bed alarm set, call light in reach, AVASYS at bedside.

## 2023-08-24 NOTE — ASSESSMENT & PLAN NOTE
Suspect associated with alcohol withdrawal. CTH no acute changes. NH3 normal and abdominal US no cirrhosis.   - started IV thiamine (Wernicke's protocol), folate, multivitamin. IV thiamine switched to PO   - started valium  - worsening mental status on 8/16 consistent with worsening withdrawals- increased valium  - withdrawals improved  - Valium taper   - continue to monitor with CIWA q4

## 2023-08-24 NOTE — NURSING
[facility], [facility]  Nurses Note -- 4 Eyes      8/24/2023       Skin assessed on: Q Shift      [x] No Pressure Injuries Present    [x]Prevention Measures Documented    [] Yes LDA  for Pressure Injury Previously documented     [] Yes New Pressure Injury Discovered   [] LDA for New Pressure Injury Added      Attending RN:  Salma Davis RN     Second RN:  YG Arellano

## 2023-08-24 NOTE — PT/OT/SLP PROGRESS
Occupational Therapy   Treatment    Name: Sony Smith  MRN: [id number]  Admitting Diagnosis:  Alcohol withdrawal syndrome, with delirium       Recommendations:     Discharge Recommendations: rehabilitation facility  Discharge Equipment Recommendations:  other (see comments)  Barriers to discharge:  Other (Comment)    Assessment:     Sony Smith is a 37 y.o. male with a medical diagnosis of Alcohol withdrawal syndrome, with delirium.  He presents with slid down in bed with slight back pain noted. Performance deficits affecting function are weakness, impaired endurance, impaired sensation, impaired self care skills, impaired functional mobility, gait instability, impaired balance, impaired cognition, decreased coordination, decreased upper extremity function, decreased lower extremity function, decreased safety awareness, pain, abnormal tone, decreased ROM, impaired coordination, impaired fine motor, impaired skin.     Rehab Prognosis:  Good; patient would benefit from acute skilled OT services to address these deficits and reach maximum level of function.       Plan:     Patient to be seen 5 x/week to address the above listed problems via self-care/home management, therapeutic activities, therapeutic exercises  Plan of Care Expires: 08/29/23  Plan of Care Reviewed with: patient    Subjective     Chief Complaint: back pain   Patient/Family Comments/goals: to improve walking   Pain/Comfort:  Pain Rating 1: 0/10 (HE DID not rate, but said has back pain)  Location 1: back    Objective:     Communicated with: RN prior to session.  Patient found HOB elevated with peripheral IV, Condom Catheter, telemetry upon OT entry to room.    General Precautions: Standard, DT, fall    Orthopedic Precautions:N/A  Braces: N/A  Respiratory Status: Room air     Occupational Performance:     Bed Mobility:    Patient completed Rolling/Turning to Right with minimum assistance  Patient completed Scooting/Bridging with minimum  assistance  Patient completed Supine to Sit with minimum assistance     Functional Mobility/Transfers:  Patient completed Sit <> Stand Transfer with minimum assistance and of 2 persons  with  rolling walker   Patient completed Bed <> Chair Transfer using Step Transfer technique with moderate assistance and of 2 persons with rolling walker  Functional Mobility: Patient took 4-5 steps with mod assist with RW from bed to chair with PT and occupational therapy present.     Activities of Daily Living:  Grooming: stand by assistance seated EOB   Upper Body Dressing: minimum assistance to don outer gown   Lower Body dressing: adjusted socks with CGA while seated EOB       [facility] 6 Click ADL: 17    Treatment & Education:  Occupational therapy educated him re: completing triceps/biceps x 10 reps x 2 sets seated EOB.   Occupational therapy educated him to retract scapula while standing to increase postural support      Patient left up in chair with  Sirisha and RN notified     GOALS:   Multidisciplinary Problems       Occupational Therapy Goals          Problem: Occupational Therapy    Goal Priority Disciplines Outcome Interventions   Occupational Therapy Goal     OT, PT/OT Ongoing, Progressing    Description: Goals to be met by: 8/29/23      Patient will increase functional independence with ADLs by performing:    UE Dressing with Modified [place].  LE Dressing with Stand-by Assistance.  Grooming while seated with Supervision.  Toileting from [facility] with with supervision for hygiene and clothing management.   Toilet transfer to [facility] with Minimal Assistance.  Supine<>sit with supervision  Step transfer with Minimal Assistance  Upper extremity exercise program x15 reps per handout, with independence   Manual w/c propulsion using BUE/BLE ~50 ft with modified independence                          Time Tracking:     OT Date of Treatment: 08/24/23  OT Start Time: 1118  OT Stop Time: 1156  OT Total Time (min): 38 min    Billable  Minutes:Self Care/Home Management 15   Therapeutic Activity 15  Therapeutic Exercise 8     OT/JOHANNY: OT     Number of JOHANNY visits since last OT visit: 0      Co-treat with PTA   8/24/2023

## 2023-08-24 NOTE — PLAN OF CARE
Patient medically accepted to [facility] for inpatient rehab.([phone]) Per Keke, awaiting insurance authorization.     Per physician, anticipate to continue valium taper for an additional 4-5 days.     Plan B SNF. Per Federica SILVESTRE, unable to accept patient while on valium taper, however will review once taper complete.     Per care port, [facility] unable to meet patient needs.     11:29am Received updated from Martha with  stated per [employer] a pre-determination request had been submitted by [facility] and was missing cpt codes, and no authorization request had been received.     Placed call to Keke informed of issue with missing codes and auth request. Provided Keke with [facility] provider contact number [phone]. Keke stated she will call [facility] direct to clarify. TN to continue to follow up.    08/24/23 0828   Post-Acute Status   Post-Acute Authorization Placement   Post-Acute Placement Status Pending payor review/awaiting authorization (if required)   Discharge Delays (!) Post-Acute Set-up   Discharge Plan   Discharge Plan A Rehab   Discharge Plan B Skilled Nursing Facility

## 2023-08-24 NOTE — PLAN OF CARE
Problem: Occupational Therapy  Goal: Occupational Therapy Goal  Description: Goals to be met by: 8/29/23      Patient will increase functional independence with ADLs by performing:    UE Dressing with Modified [place].  LE Dressing with Stand-by Assistance.  Grooming while seated with Supervision. MET.   Toileting from [facility] with with supervision for hygiene and clothing management.   Toilet transfer to [facility] with Minimal Assistance.  Supine<>sit with supervision  Step transfer with Minimal Assistance  Upper extremity exercise program x15 reps per handout, with independence   Manual w/c propulsion using BUE/BLE ~50 ft with modified independence  MET.     Outcome: Ongoing, Progressing

## 2023-08-24 NOTE — PROGRESS NOTES
[facility] Medicine  Progress Note    Patient Name: Sony Smith  MRN: [id number]  Patient Class: IP- Inpatient   Admission Date: 8/14/2023  Length of Stay: 10 days  Attending Physician: Nathen Lopes MD  Primary Care Provider: Meghana, Primary Doctor        Subjective:     Principal Problem:Alcohol withdrawal syndrome, with delirium        HPI:  This is a 37 year old male with PMHx of alcohol abuse,who presents to the ED via EMS for chief complaint of Altered Mental Status onset this morning. Patient reports travelling for work and that he has been under stress and his symptoms began in [facility]. Additional history obtained by an independent historian: EMS personnel, notes that when EMS arrived patient was sitting on a lawn chair on the porch incoherent; patient's roommate stated that the patient is a recovering alcoholic and had 1 alcoholic beverage this morning as well as usually being coherent and able to ambulate. Patient was also noted to repeat himself and when asked when his symptoms started he stated they began in July. EMS further noted unsteady gait, and that the patient complained of bilateral forearm and lower extremity pain with edema.  Patient further denies cough, shortness of breath, chest pain, fever, chills, abdominal pain, nausea, vomiting, diarrhea, dysuria, headaches, congestion, sore throat, eye pain, ear pain, rash, or other associated symptoms. Patient denies recreational drug use;patient has elevated lactic acid 10.3,improved with bolus of IVF,he has hypokalemia, 2.9,replaced,CT head show no acute process,patient has lege edema,US legs show no DVT.he has proteinuria on UA.patient is admitted for inpatient status.      Overview/Hospital Course:  Mr Sony Smith was admitted with acute encephalopathy and lower extremity edema. Suspect associated with alcohol use. Started IV thiamine (Wernicke treatment protocol), folate, multivitamin, valium. Regarding lower extremity  edema, suspect associated with alcoholic hepatitis. Abdominal US no cirrhosis or ascites. TTE no CHF. Given lasix with improvement. PT, OT consulted. Mental status improved but then worsened on 8/16 with worsening withdrawal symptoms. Increased valium dosing to 20mg Q6h. Withdrawal symptoms improved. Weaning valium slowly. Confusion is improving.       Interval History:  intermittently confused but easily redirected/reoriented. Answers questions appropriately.    Review of Systems  Objective:     Vital Signs (Most Recent):  Temp: 98.5 °F (36.9 °C) (08/24/23 1157)  Pulse: 101 (08/24/23 1157)  Resp: 15 (08/24/23 1157)  BP: 105/63 (08/24/23 1157)  SpO2: 99 % (08/24/23 1157) Vital Signs (24h Range):  Temp:  [97.8 °F (36.6 °C)-98.7 °F (37.1 °C)] 98.5 °F (36.9 °C)  Pulse:  [] 101  Resp:  [15-18] 15  SpO2:  [98 %-100 %] 99 %  BP: (105-121)/(63-73) 105/63     Weight: 67.2 kg (148 lb 2.4 oz)  Body mass index is 21.88 kg/m².    Intake/Output Summary (Last 24 hours) at 8/24/2023 1221  Last data filed at 8/24/2023 0830  Gross per 24 hour   Intake 720 ml   Output 800 ml   Net -80 ml           Physical Exam  Vitals and nursing note reviewed.   Constitutional:       General: He is not in acute distress.     Appearance: He is ill-appearing. He is not toxic-appearing.   HENT:      Head: Normocephalic and atraumatic.   Cardiovascular:      Rate and Rhythm: Normal rate and regular rhythm.   Pulmonary:      Effort: Pulmonary effort is normal.      Breath sounds: Normal breath sounds.      Comments: Room air  Musculoskeletal:      Right lower leg: No edema.      Left lower leg: No edema.      Comments: Generalized weakness   Skin:     General: Skin is warm and dry.   Neurological:      Comments: Alert and oriented x3             Significant Labs: All pertinent labs within the past 24 hours have been reviewed.    Significant Imaging: I have reviewed all pertinent imaging results/findings within the past 24  hours.      Assessment/Plan:      * Alcohol withdrawal syndrome, with delirium  Suspect associated with alcohol withdrawal. CTH no acute changes. NH3 normal and abdominal US no cirrhosis.   - started IV thiamine (Wernicke's protocol), folate, multivitamin. IV thiamine switched to PO   - started valium  - worsening mental status on 8/16 consistent with worsening withdrawals- increased valium  - withdrawals improved  - Valium taper   - continue to monitor with CIWA q4    Debility  Very weak when working with PT, OT  - continue PT, OT  - planning IPR for dispo      Folate deficiency  Supplement       Thrombocytopenia  Plts 113 on admit, no signs of bleeding. Most likely associated with alcohol abuse      Macrocytic anemia  Associated with alcohol abuse and folate deficiency  - supplement folate       Alcoholic hepatitis without ascites  No signs of cirrhosis. Not consistent with acute liver failure. Treating alcohol withdrawal. AST/ALT elevations due to alcoholic hepatitis.       Alcohol abuse  See above      Lower extremity edema  Suspect associated with alcoholic hepatitis. US Abdomen no cirrhosis. UA only 1+ protein, doubt nephrotic syndrome. TTE no CHF.   -resolved with lasix      VTE Risk Mitigation (From admission, onward)         Ordered     heparin (porcine) injection 5,000 Units  Every 8 hours         08/14/23 1449                Discharge Planning   ROMAIN: 8/23/2023     Code Status: Full Code   Is the patient medically ready for discharge?:     Reason for patient still in hospital (select all that apply): Treatment  Discharge Plan A: Rehab   Discharge Delays: (!) Post-Acute Set-up              Nathen Lopes MD  Department of Hospital Medicine   [facility]

## 2023-08-24 NOTE — PT/OT/SLP PROGRESS
Physical Therapy Treatment    Patient Name:  Sony Smith   MRN:  [id number]    Recommendations:     Discharge Recommendations: rehabilitation facility  Discharge Equipment Recommendations:  (TBD)  Barriers to discharge: Prior to admission patient was independent with mobility and self-care and there is expectation of returning to prior level of function to maintain independence avoiding readmission. Pt is at high risk of unplanned readmission due to fall risk and not being at prior level of function. The lower level of care cannot provide total interdisciplinary approach needed. Pt is able to tolerate 3 hours of daily therapy. Pt is pleasant and motivated to return to prior level of function.  Pt with multiple stairs at home.    Assessment:     Sony Smith is a 37 y.o. male admitted with a medical diagnosis of Alcohol withdrawal syndrome, with delirium.  He presents with the following impairments/functional limitations: weakness, impaired endurance, impaired self care skills, gait instability, impaired balance, decreased lower extremity function, impaired functional mobility, pain, decreased safety awareness, decreased ROM, impaired cognition, decreased coordination, decreased upper extremity function, impaired coordination .    Rehab Prognosis: Good; patient would benefit from acute skilled PT services to address these deficits and reach maximum level of function.    Recent Surgery: * No surgery found *      Plan:     During this hospitalization, patient to be seen 5 x/week to address the identified rehab impairments via gait training, therapeutic activities, therapeutic exercises and progress toward the following goals:    Plan of Care Expires:  08/29/23    Subjective     Chief Complaint :   Patient/Family Comments/goals: pt is pleasantly confused, agreeable to therapy    Pain/Comfort:  Location - Orientation 1: lower  Location 1: back  Pain Addressed 1: Pre-medicate for activity, Nurse notified,  Reposition      Objective:     Communicated with nurse prior to session.  Patient found HOB elevated  with telemetry, bed alarm, peripheral IV, Condom Catheter upon PT entry to room.     General Precautions: Standard, fall  Orthopedic Precautions: N/A  Braces: N/A  Respiratory Status: Room air     Functional Mobility:  Bed Mobility:     Rolling right :  stand by assistance  Scooting: contact guard assistance  Supine to Sit: min assistance , HOB elevated, bedside rail   Transfers:     Sit to Stand: 10  trials with BUE push off from bedside rail MIN A x 2 and 1 trial with RW MOD assistance and of 2 persons with rolling walker  . Pt with min body shakiness/ tremors upon standing . Pt required MAX V/T cues to improve upright posture for safety, proper technique and walker management.   Bed to chair: max assistance and of 2 persons with RW  using  Step transfer   Gait : trained 2  steps and 5-6 steps on level tile with Rolling Walker with Max Assistance x 2.  Pt with demonstarting an ataxic gait ;  required max A to advance BLE . Noted with decreased juno, increased time in double stance, decreased velocity of limb motion, decreased step length, decreased swing-to-stance ratio, decreased toe-to-floor clearance and decreased weight-shifting ability.Impairments contributing to gait deviations include impaired balance, decreased flexibility, pain, impaired postural control, decreased ROM and decreased strength. V/T cues for safety technique and walker management.    Balance:  fair+ in sitting, poor in standing       AM-PAC 6 CLICK MOBILITY  Turning over in bed (including adjusting bedclothes, sheets and blankets)?: 4  Sitting down on and standing up from a chair with arms (e.g., wheelchair, bedside commode, etc.): 3  Moving from lying on back to sitting on the side of the bed?: 3  Moving to and from a bed to a chair (including a wheelchair)?: 2  Need to walk in hospital room?: 2  Climbing 3-5 steps with a railing?:  1  Basic Mobility Total Score: 15       Treatment & Education:  Pt performed bed mobility , transfer and gait training as above.     Patient left up in chair on green air cushion with all lines intact, call button in reach, and nurse notified, avasys present.     GOALS:   Multidisciplinary Problems       Physical Therapy Goals          Problem: Physical Therapy    Goal Priority Disciplines Outcome Goal Variances Interventions   Physical Therapy Goal     PT, PT/OT Ongoing, Progressing     Description: Goals to be met by: 23     Patient will increase functional independence with mobility by performin. Supine to sit with Modified [place]  2. Rolling to Left and Right with Modified [place]  3. Sit to stand transfer with Modified [place] using RW  4. Bed to chair transfer with Modified [place]   5. Gait x50 feet with Modified [place] using Rolling Walker   6. Wheelchair propulsion x200 feet with Modified [place] using bilateral upper extremities  7. Lower extremity exercise program 2 sets x15 reps per handout, with independence                         Time Tracking:     PT Received On: 23  PT Start Time: 1118     PT Stop Time: 1147  PT Total Time (min): 29 min     Billable Minutes: Gait Training 11, Therapeutic Activity 18, and Total Time 29 min with OT   1545 - 1553 : 8 min , 1 TA   Pt is ready to get back in bed. Chair to bed : total A with no AD, squat pivot transfer. Sit to supine : mod A  . Pt left in supine, heels offloading, all lines intact , call button in reach , bed alarm on,PCT and Avasys present.    Treatment Type: Treatment  PT/PTA: PTA     Number of PTA visits since last PT visit: 2023

## 2023-08-24 NOTE — NURSING
Pt lying in bed, chest rise and fall. Eyes open with simulation. Telebox [id number]. BSC  and urinal in reach. Bed alarm is set. AVASYS at bedside. Instructed pt to call for assistance.     [facility]  Nurses Note -- 4 Eyes      8/23/2023       Skin assessed on: Q Shift      [x] No Pressure Injuries Present    [x]Prevention Measures Documented    [] Yes LDA  for Pressure Injury Previously documented     [] Yes New Pressure Injury Discovered   [] LDA for New Pressure Injury Added      Attending RN:  Eileen Gong RN     Second RN:  YG Aleman

## 2023-08-24 NOTE — SUBJECTIVE & OBJECTIVE
Interval History:  intermittently confused but easily redirected/reoriented. Answers questions appropriately.    Review of Systems  Objective:     Vital Signs (Most Recent):  Temp: 98.5 °F (36.9 °C) (08/24/23 1157)  Pulse: 101 (08/24/23 1157)  Resp: 15 (08/24/23 1157)  BP: 105/63 (08/24/23 1157)  SpO2: 99 % (08/24/23 1157) Vital Signs (24h Range):  Temp:  [97.8 °F (36.6 °C)-98.7 °F (37.1 °C)] 98.5 °F (36.9 °C)  Pulse:  [] 101  Resp:  [15-18] 15  SpO2:  [98 %-100 %] 99 %  BP: (105-121)/(63-73) 105/63     Weight: 67.2 kg (148 lb 2.4 oz)  Body mass index is 21.88 kg/m².    Intake/Output Summary (Last 24 hours) at 8/24/2023 1221  Last data filed at 8/24/2023 0830  Gross per 24 hour   Intake 720 ml   Output 800 ml   Net -80 ml           Physical Exam  Vitals and nursing note reviewed.   Constitutional:       General: He is not in acute distress.     Appearance: He is ill-appearing. He is not toxic-appearing.   HENT:      Head: Normocephalic and atraumatic.   Cardiovascular:      Rate and Rhythm: Normal rate and regular rhythm.   Pulmonary:      Effort: Pulmonary effort is normal.      Breath sounds: Normal breath sounds.      Comments: Room air  Musculoskeletal:      Right lower leg: No edema.      Left lower leg: No edema.      Comments: Generalized weakness   Skin:     General: Skin is warm and dry.   Neurological:      Comments: Alert and oriented x3             Significant Labs: All pertinent labs within the past 24 hours have been reviewed.    Significant Imaging: I have reviewed all pertinent imaging results/findings within the past 24 hours.

## 2023-08-24 NOTE — PLAN OF CARE
Problem: Adult Inpatient Plan of Care  Goal: Plan of Care Review  Outcome: Ongoing, Progressing  Goal: Patient-Specific Goal (Individualized)  Outcome: Ongoing, Progressing  Goal: Absence of Hospital-Acquired Illness or Injury  Outcome: Ongoing, Progressing  Goal: Optimal Comfort and Wellbeing  Outcome: Ongoing, Progressing  Goal: Readiness for Transition of Care  Outcome: Ongoing, Progressing     Problem: Fall Injury Risk  Goal: Absence of Fall and Fall-Related Injury  Outcome: Ongoing, Progressing     Problem: Alcohol Withdrawal  Goal: Alcohol Withdrawal Symptom Control  Outcome: Ongoing, Progressing     Problem: Skin Injury Risk Increased  Goal: Skin Health and Integrity  Outcome: Ongoing, Progressing     Problem: Adjustment to Illness (Delirium)  Goal: Optimal Coping  Outcome: Ongoing, Progressing     Problem: Altered Behavior (Delirium)  Goal: Improved Behavioral Control  Outcome: Ongoing, Progressing     Problem: Attention and Thought Clarity Impairment (Delirium)  Goal: Improved Attention and Thought Clarity  Outcome: Ongoing, Progressing     Problem: Sleep Disturbance (Delirium)  Goal: Improved Sleep  Outcome: Ongoing, Progressing

## 2023-08-25 LAB
ALBUMIN SERPL BCP-MCNC: 2.9 G/DL (ref 3.5–5.2)
ALP SERPL-CCNC: 128 U/L (ref 55–135)
ALT SERPL W/O P-5'-P-CCNC: 69 U/L (ref 10–44)
ANION GAP SERPL CALC-SCNC: 7 MMOL/L (ref 8–16)
AST SERPL-CCNC: 92 U/L (ref 10–40)
BASOPHILS # BLD AUTO: 0.06 K/UL (ref 0–0.2)
BASOPHILS NFR BLD: 0.9 % (ref 0–1.9)
BILIRUB SERPL-MCNC: 0.5 MG/DL (ref 0.1–1)
BUN SERPL-MCNC: 8 MG/DL (ref 6–20)
CALCIUM SERPL-MCNC: 9.1 MG/DL (ref 8.7–10.5)
CHLORIDE SERPL-SCNC: 106 MMOL/L (ref 95–110)
CO2 SERPL-SCNC: 23 MMOL/L (ref 23–29)
CREAT SERPL-MCNC: 0.6 MG/DL (ref 0.5–1.4)
DIFFERENTIAL METHOD: ABNORMAL
EOSINOPHIL # BLD AUTO: 0.2 K/UL (ref 0–0.5)
EOSINOPHIL NFR BLD: 2.2 % (ref 0–8)
ERYTHROCYTE [DISTWIDTH] IN BLOOD BY AUTOMATED COUNT: 17.7 % (ref 11.5–14.5)
EST. GFR  (NO RACE VARIABLE): >60 ML/MIN/1.73 M^2
GLUCOSE SERPL-MCNC: 88 MG/DL (ref 70–110)
HCT VFR BLD AUTO: 31.5 % (ref 40–54)
HGB BLD-MCNC: 10.6 G/DL (ref 14–18)
IMM GRANULOCYTES # BLD AUTO: 0.06 K/UL (ref 0–0.04)
IMM GRANULOCYTES NFR BLD AUTO: 0.9 % (ref 0–0.5)
LYMPHOCYTES # BLD AUTO: 1.5 K/UL (ref 1–4.8)
LYMPHOCYTES NFR BLD: 22.2 % (ref 18–48)
MCH RBC QN AUTO: 41.2 PG (ref 27–31)
MCHC RBC AUTO-ENTMCNC: 33.7 G/DL (ref 32–36)
MCV RBC AUTO: 123 FL (ref 82–98)
MONOCYTES # BLD AUTO: 0.6 K/UL (ref 0.3–1)
MONOCYTES NFR BLD: 8.7 % (ref 4–15)
NEUTROPHILS # BLD AUTO: 4.4 K/UL (ref 1.8–7.7)
NEUTROPHILS NFR BLD: 65.1 % (ref 38–73)
NRBC BLD-RTO: 0 /100 WBC
PLATELET # BLD AUTO: 403 K/UL (ref 150–450)
PMV BLD AUTO: 8.8 FL (ref 9.2–12.9)
POTASSIUM SERPL-SCNC: 4 MMOL/L (ref 3.5–5.1)
PROT SERPL-MCNC: 6.5 G/DL (ref 6–8.4)
RBC # BLD AUTO: 2.57 M/UL (ref 4.6–6.2)
SODIUM SERPL-SCNC: 136 MMOL/L (ref 136–145)
WBC # BLD AUTO: 6.8 K/UL (ref 3.9–12.7)

## 2023-08-25 PROCEDURE — 85025 COMPLETE CBC W/AUTO DIFF WBC: CPT | Performed by: STUDENT IN AN ORGANIZED HEALTH CARE EDUCATION/TRAINING PROGRAM

## 2023-08-25 PROCEDURE — 97110 THERAPEUTIC EXERCISES: CPT

## 2023-08-25 PROCEDURE — 25000003 PHARM REV CODE 250: Performed by: HOSPITALIST

## 2023-08-25 PROCEDURE — 97110 THERAPEUTIC EXERCISES: CPT | Mod: CQ

## 2023-08-25 PROCEDURE — 97530 THERAPEUTIC ACTIVITIES: CPT | Mod: CQ

## 2023-08-25 PROCEDURE — 25000003 PHARM REV CODE 250: Performed by: STUDENT IN AN ORGANIZED HEALTH CARE EDUCATION/TRAINING PROGRAM

## 2023-08-25 PROCEDURE — 97535 SELF CARE MNGMENT TRAINING: CPT

## 2023-08-25 PROCEDURE — S4991 NICOTINE PATCH NONLEGEND: HCPCS | Performed by: HOSPITALIST

## 2023-08-25 PROCEDURE — 36415 COLL VENOUS BLD VENIPUNCTURE: CPT | Performed by: STUDENT IN AN ORGANIZED HEALTH CARE EDUCATION/TRAINING PROGRAM

## 2023-08-25 PROCEDURE — 97530 THERAPEUTIC ACTIVITIES: CPT

## 2023-08-25 PROCEDURE — 97116 GAIT TRAINING THERAPY: CPT | Mod: CQ

## 2023-08-25 PROCEDURE — 80053 COMPREHEN METABOLIC PANEL: CPT | Performed by: STUDENT IN AN ORGANIZED HEALTH CARE EDUCATION/TRAINING PROGRAM

## 2023-08-25 PROCEDURE — 63600175 PHARM REV CODE 636 W HCPCS: Performed by: HOSPITALIST

## 2023-08-25 PROCEDURE — 11000001 HC ACUTE MED/SURG PRIVATE ROOM

## 2023-08-25 RX ADMIN — HEPARIN SODIUM 5000 UNITS: 5000 INJECTION INTRAVENOUS; SUBCUTANEOUS at 02:08

## 2023-08-25 RX ADMIN — Medication 1 PATCH: at 08:08

## 2023-08-25 RX ADMIN — HEPARIN SODIUM 5000 UNITS: 5000 INJECTION INTRAVENOUS; SUBCUTANEOUS at 06:08

## 2023-08-25 RX ADMIN — THIAMINE HCL TAB 100 MG 100 MG: 100 TAB at 08:08

## 2023-08-25 RX ADMIN — FOLIC ACID 1 MG: 1 TABLET ORAL at 08:08

## 2023-08-25 RX ADMIN — ACETAMINOPHEN 650 MG: 325 TABLET ORAL at 10:08

## 2023-08-25 RX ADMIN — DIAZEPAM 10 MG: 5 TABLET ORAL at 10:08

## 2023-08-25 RX ADMIN — THERA TABS 1 TABLET: TAB at 08:08

## 2023-08-25 RX ADMIN — DIAZEPAM 10 MG: 5 TABLET ORAL at 06:08

## 2023-08-25 RX ADMIN — HEPARIN SODIUM 5000 UNITS: 5000 INJECTION INTRAVENOUS; SUBCUTANEOUS at 10:08

## 2023-08-25 RX ADMIN — DIAZEPAM 10 MG: 5 TABLET ORAL at 02:08

## 2023-08-25 NOTE — PT/OT/SLP PROGRESS
Occupational Therapy   Treatment    Name: Sony Smith  MRN: [id number]  Admitting Diagnosis:  Alcohol withdrawal syndrome, with delirium       Recommendations:     Discharge Recommendations: rehabilitation facility  Discharge Equipment Recommendations:  to be determined by next level of care  Barriers to discharge:   Pt not at PLOF as he requires max A x 1 person for safe ambulation; pt will benefit from a multidisciplinary approach in an IPR setting for returning to PLOF.      Assessment:     Sony Smith is a 37 y.o. male with a medical diagnosis of Alcohol withdrawal syndrome, with delirium. Performance deficits affecting function are weakness, impaired endurance, impaired self care skills, impaired functional mobility, gait instability, impaired balance, impaired cognition, decreased coordination, decreased upper extremity function, decreased lower extremity function, decreased safety awareness, pain.     Pt w/ good progress this date as he was able to ambulate short distances w/ mod-max A x 1 person and performed standing ADLs at sink w/ max A  x 2 persons. Pt remained motivated throughout session and participated in seated BUE TE for increasing UB strength and endurance for safe performance w/ ADLs and functional mobility. Pt w/ gross weakness, requiring rest break, but tolerated tx session well and will continue to benefit from skilled acute OT services to maximize functional capacity.     Pt remains motivated and will benefit from multidisciplinary approach in an IPR at time of d/c setting for returning to PLOF.     Rehab Prognosis:  Good; patient would benefit from acute skilled OT services to address these deficits and reach maximum level of function.       Plan:     Patient to be seen 5 x/week to address the above listed problems via self-care/home management, therapeutic activities, therapeutic exercises  Plan of Care Expires: 08/29/23  Plan of Care Reviewed with: patient    Subjective     Chief  "Complaint: "First time having to use a wheelchair."   Patient/Family Comments/goals: Eager to get to rehab; inquiring about how his days will look.   Pain/Comfort:  Location 1: back  Pain Addressed 1: Reposition, Distraction    Objective:     Communicated with: Nurse prior to session.  Patient found HOB elevated with telemetry, peripheral IV, Condom Catheter upon OT entry to room.    General Precautions: Standard, fall    Orthopedic Precautions:N/A  Braces: N/A  Respiratory Status: Room air     Occupational Performance:     Bed Mobility:    Patient completed Scooting hips to EOB with contact guard assistance  Patient completed Supine to Sit with minimum assistance and HOB elevated     Functional Mobility/Transfers:  Patient completed Sit <> Stand Transfer x 1 trial from EOB and x 2 trials from wheelchair with moderate assistance and of 2 persons  with  rolling walker   Patient completed Bed <> WheelChair Transfer using Step Transfer technique with of 2 persons with rolling walker  Patient completed WheelChair <> Recliner Step Transfer technique with moderate assistance and of 2 persons with rolling walker  Functional Mobility: Pt was able to ambulate ~8 ft w/ mod-max A x 1 person; OT following w/ chair for safety. Refer to PT note for gait assessment.   Pt was able to propel W/C at functional distances w/ SBA and use of BUEs and BLEs.     Activities of Daily Living:  Grooming: pt required max A x 2 persons for maintaining standing balance at sink for washing BUEs; pt w/ flexed posture once he released BUEs from sink that was providing him w/ support; pt was able to use unilateral support as needed for reaching in multiple planes to retrieve soap and napkins and to turn on faucets   Upper Body Dressing: minimum assistance for donning gown over back while sitting at EOB, unsupported     [facility] 6 Click ADL: 18    Treatment & Education:  -Pt was able to perform ADLs and functional mobility as noted above.   -Pt educated " on safe body mechanics and functional mobility.   -Pt performed the following BUE TE w/ use of red theraband for 1 set x 10 reps to increase UB strength and endurance for safe performance w/ ADLs and functional mobility:    -shoulder abd/add   -shoulder IR/ER   -horizontal shoulder raises    -bicep curls   -Questions and concerns addressed within scope.     Patient left up in chair with all lines intact and call button in reach    GOALS:   Multidisciplinary Problems       Occupational Therapy Goals          Problem: Occupational Therapy    Goal Priority Disciplines Outcome Interventions   Occupational Therapy Goal     OT, PT/OT Ongoing, Progressing    Description: Goals to be met by: 8/29/23      Patient will increase functional independence with ADLs by performing:    UE Dressing with Modified [place].  LE Dressing with Stand-by Assistance.  Grooming while seated with Supervision.  Toileting from [facility] with with supervision for hygiene and clothing management.   Toilet transfer to [facility] with Minimal Assistance.  Supine<>sit with supervision  Step transfer with Minimal Assistance  Upper extremity exercise program x15 reps per handout, with independence   Manual w/c propulsion using BUE/BLE ~50 ft with modified independence                          Time Tracking:     OT Date of Treatment: 08/25/23  OT Start Time: 1010  OT Stop Time: 1049  OT Total Time (min): 39 min    Billable Minutes:Self Care/Home Management 15  Therapeutic Activity 15  Therapeutic Exercise 9  Total Time 39 (co-tx w/ PT)     OT/JOHANNY: OT     Number of JOHANNY visits since last OT visit: 0    8/25/2023   How Severe Is It?: moderate Is This A New Presentation, Or A Follow-Up?: Aging Face

## 2023-08-25 NOTE — PT/OT/SLP PROGRESS
Physical Therapy Treatment    Patient Name:  Sony Smith   MRN:  [id number]    Recommendations:     Discharge Recommendations: rehabilitation facility  Discharge Equipment Recommendations:  (TBD)  Barriers to discharge:  Prior to admission patient was independent with mobility and self-care and there is expectation of returning to prior level of function to maintain independence avoiding readmission. Pt is at high risk of unplanned readmission due to fall risk and not being at prior level of function. The lower level of care cannot provide total interdisciplinary approach needed. Pt is able to tolerate 3 hours of daily therapy. Pt is pleasant and motivated to return to prior level of function.  Pt with multiple stairs at home.    Assessment:     Sony Smith is a 37 y.o. male admitted with a medical diagnosis of Alcohol withdrawal syndrome, with delirium.  He presents with the following impairments/functional limitations: impaired functional mobility, impaired endurance, weakness, impaired self care skills, impaired balance, decreased coordination, decreased upper extremity function, decreased lower extremity function, decreased ROM, pain, decreased safety awareness, impaired cognition, impaired coordination, gait instability .    Rehab Prognosis: Good; patient would benefit from acute skilled PT services to address these deficits and reach maximum level of function.    Recent Surgery: * No surgery found *      Plan:     During this hospitalization, patient to be seen 5 x/week to address the identified rehab impairments via gait training, therapeutic activities, therapeutic exercises and progress toward the following goals:    Plan of Care Expires:  08/29/23    Subjective     Chief Complaint: weakness   Patient/Family Comments/goals: pt is pleasant and agreeable to therapy   Pain/Comfort:  Location - Orientation 1: lower  Location 1: back  Pain Addressed 1: Nurse notified, Pre-medicate for  activity      Objective:     Communicated with nurse  prior to session.  Patient found HOB elevated ( slid down in bed with BLE over bed rail  ) with telemetry, bed alarm, peripheral IV, Condom Catheter upon PT entry to room.     General Precautions: Standard, fall  Orthopedic Precautions: N/A  Braces: N/A  Respiratory Status: Room air     Functional Mobility:  Bed Mobility:     Scooting: contact guard assistance  Supine to Sit: min assistance , HOB elevated, bedside rail   Transfers:     Sit to Stand:1 trial from bed and 2 trials from wheelchair with RW, MOD A x 2  . . Pt required MAX V/T cues to improve upright posture for safety, proper technique and walker management.   Bed to wheelchair: mod assistance and of 2 persons with RW  using  Step transfer  Wheelchair to bedside chair : mod assistance and of 2 persons with RW  using  Step transfer   Gait : trained ~ 8 ft  on level tile with Rolling Walker with Mod/max Assistance( wheelchair followed) .  Pt with demonstarting an ataxic gait however less posterior lean (able to overcome central of gravity when standing/ambulating )  . Noted with decreased juno, increased time in double stance, decreased velocity of limb motion, decreased step length, decreased swing-to-stance ratio, decreased toe-to-floor clearance and decreased weight-shifting ability.Impairments contributing to gait deviations include impaired balance, decreased flexibility, pain, impaired postural control, decreased ROM and decreased strength. V/T cues for safety technique and walker management.    Balance:  fair+ in sitting, poor in standing   Wheelchair Propulsion:  Pt propelled Standard wheelchair  200 ft  on Level tile with  Bilateral upper extremity and Bilateral lower extremity with Stand-by Assistance/S . VC's for safety technique and w/c management to prevent steering/turning to wall / hallway obstacles.   Pt required V/T cues to lock/unlock wheelchair breaks.     AM-PAC 6 CLICK  MOBILITY  Turning over in bed (including adjusting bedclothes, sheets and blankets)?: 4  Sitting down on and standing up from a chair with arms (e.g., wheelchair, bedside commode, etc.): 2  Moving from lying on back to sitting on the side of the bed?: 3  Moving to and from a bed to a chair (including a wheelchair)?: 2  Need to walk in hospital room?: 2  Climbing 3-5 steps with a railing?: 1  Basic Mobility Total Score: 14       Treatment & Education:  Pt tolerated static/dynamic standing at the sink for hand hygiene with OT , pt required MAX A x 2 for standing balance.   Instructed pt to perform BLE x 20 reps : AP while in reclined chair .   Educated pt on safety awareness with all OOB mobility , transfer and gait training . Pt verbalized understanding.   Patient left up in reclined chair on green air cushion, BLE elevated, heels offloading with all lines intact, call button in reach, nurse notified, and [employer] present..    GOALS:   Multidisciplinary Problems       Physical Therapy Goals          Problem: Physical Therapy    Goal Priority Disciplines Outcome Goal Variances Interventions   Physical Therapy Goal     PT, PT/OT Ongoing, Progressing     Description: Goals to be met by: 23     Patient will increase functional independence with mobility by performin. Supine to sit with Modified [place]  2. Rolling to Left and Right with Modified [place]  3. Sit to stand transfer with Modified [place] using RW  4. Bed to chair transfer with Modified [place]   5. Gait x50 feet with Modified [place] using Rolling Walker   6. Wheelchair propulsion x200 feet with Modified [place] using bilateral upper extremities  7. Lower extremity exercise program 2 sets x15 reps per handout, with independence                         Time Tracking:     PT Received On: 23  PT Start Time: 1010     PT Stop Time: 1049  PT Total Time (min): 39 min     Billable Minutes: Gait Training 10, Therapeutic  Activity 15, and Train/Wheelchair Management 14    Treatment Type: Treatment  PT/PTA: PTA     Number of PTA visits since last PT visit: 2     08/25/2023

## 2023-08-25 NOTE — NURSING
Bed alarm going off . Trying to get out of bed. States needs to go to bathroom to have bowel movement. Legs very weak upon trying to stand. Unable to make a step. Back to bed and placed on bedpan.

## 2023-08-25 NOTE — PROGRESS NOTES
"  [facility] - Med Surg  Adult Nutrition  Consult Note    SUMMARY     Recommendations    1. Continue Regular diet, monitor PO intake   2. Continue to monitor weights and labs  3.   Goals: 1. Pt to meet % EEN/EPN by RD follow up  Nutrition Goal Status: new  Communication of RD Recs:  (POC)    Assessment and Plan    Nutrition Problem  No nutrition dx at this time    Reason for Assessment    Reason For Assessment: length of stay  Diagnosis:  (alcohol withdrawal syndrome with delirium)  Relevant Medical History: History reviewed. No pertinent past medical history.   Interdisciplinary Rounds: did not attend  General Information Comments: RD consult LOS > 8 days. Pt currently on Regular Diet supplemented with Boost Glucose Control. Pt with about 50-75% intake since admit. Continue to monitor PO intake. Pt with no weight changes dating back to 2019. Continue to monitor weights. NFPE not appropriate at this time.  Nutrition Discharge Planning: Regular Diet    Nutrition Risk Screen    Nutrition Risk Screen: no indicators present    Nutrition/Diet History    Spiritual, Cultural Beliefs, Islam Practices, Values that Affect Care: no  Food Allergies: NKFA    Anthropometrics    Temp: 98.8 °F (37.1 °C)  Height Method: Stated  Height: 5' 9" (175.3 cm)  Height (inches): 69 in  Weight Method: Bed Scale  Weight: 67.1 kg (148 lb)  Weight (lb): 148 lb  Ideal Body Weight (IBW), Male: 160 lb  % Ideal Body Weight, Male (lb): 92.5 %  BMI (Calculated): 21.8  BMI Grade: 18.5-24.9 - normal       Lab/Procedures/Meds    Pertinent Labs Reviewed: reviewed  Pertinent Medications Reviewed: reviewed    Physical Findings/Assessment         Estimated/Assessed Needs    Weight Used For Calorie Calculations: 67.1 kg (148 lb)  Energy Calorie Requirements (kcal): 2055 kcal  Energy Need Method: Ingham-St Jeor (x 1.3)  Protein Requirements: 67 kg  Weight Used For Protein Calculations: 67.1 kg (148 lb)     Estimated Fluid Requirement Method: RDA " Method  RDA Method (mL): 2055         Nutrition Prescription Ordered    Current Diet Order: Regular Diet    Evaluation of Received Nutrient/Fluid Intake    I/O: -3L  Energy Calories Required: meeting needs  Protein Required: meeting needs  Fluid Required: meeting needs  Comments: LBM 8/23/23  % Intake of Estimated Energy Needs: {IP NUTRITION % INTAKE OF ESTIMATED ENERGY NEEDS:533636045}  % Meal Intake: {IP NUTRITION % MEAL INTAKE:916702002}    Nutrition Risk    Level of Risk/Frequency of Follow-up: moderate       Monitor and Evaluation    Food and Nutrient Intake: food and beverage intake  Food and Nutrient Adminstration: diet order  Knowledge/Beliefs/Attitudes: beliefs and attitudes, food and nutrition knowledge/skill  Physical Activity and Function: nutrition-related ADLs and IADLs, factors affecting access to physical activity  Anthropometric Measurements: weight, weight change, body mass index  Biochemical Data, Medical Tests and Procedures: electrolyte and renal panel  Nutrition-Focused Physical Findings: overall appearance       Nutrition Follow-Up    RD Follow-up?: Yes

## 2023-08-25 NOTE — SUBJECTIVE & OBJECTIVE
Interval History:  no acute issues, still intermittently confused but not agitated, combative and no hallucinations. Not tachycardic or hypertensive.    Review of Systems  Objective:     Vital Signs (Most Recent):  Temp: 98.8 °F (37.1 °C) (08/25/23 1113)  Pulse: 100 (08/25/23 1113)  Resp: 18 (08/25/23 1113)  BP: 113/73 (08/25/23 1113)  SpO2: 100 % (08/25/23 1113) Vital Signs (24h Range):  Temp:  [97.8 °F (36.6 °C)-98.8 °F (37.1 °C)] 98.8 °F (37.1 °C)  Pulse:  [] 100  Resp:  [15-18] 18  SpO2:  [96 %-100 %] 100 %  BP: (113-132)/(71-76) 113/73     Weight: 67.1 kg (148 lb)  Body mass index is 21.86 kg/m².    Intake/Output Summary (Last 24 hours) at 8/25/2023 1407  Last data filed at 8/25/2023 0901  Gross per 24 hour   Intake 290 ml   Output 400 ml   Net -110 ml           Physical Exam  Vitals and nursing note reviewed.   Constitutional:       General: He is not in acute distress.     Appearance: He is ill-appearing. He is not toxic-appearing.   HENT:      Head: Normocephalic and atraumatic.   Cardiovascular:      Rate and Rhythm: Normal rate and regular rhythm.   Pulmonary:      Effort: Pulmonary effort is normal.      Breath sounds: Normal breath sounds.      Comments: Room air  Musculoskeletal:      Right lower leg: No edema.      Left lower leg: No edema.      Comments: Generalized weakness   Skin:     General: Skin is warm and dry.   Neurological:      Comments: Alert and oriented to self, location and situation but disoriented to year             Significant Labs: All pertinent labs within the past 24 hours have been reviewed.    Significant Imaging: I have reviewed all pertinent imaging results/findings within the past 24 hours.

## 2023-08-25 NOTE — PROGRESS NOTES
[facility]  Progress Note    Patient Name: Sony Smith  MRN: [id number]  Patient Class: IP- Inpatient   Admission Date: 8/14/2023  Length of Stay: 11 days  Attending Physician: Nathen Lopes MD  Primary Care Provider: Meghana, Primary Doctor        Subjective:     Principal Problem:Alcohol withdrawal syndrome, with delirium        HPI:  This is a 37 year old male with PMHx of alcohol abuse,who presents to the ED via EMS for chief complaint of Altered Mental Status onset this morning. Patient reports travelling for work and that he has been under stress and his symptoms began in [facility]. Additional history obtained by an independent historian: EMS personnel, notes that when EMS arrived patient was sitting on a lawn chair on the porch incoherent; patient's roommate stated that the patient is a recovering alcoholic and had 1 alcoholic beverage this morning as well as usually being coherent and able to ambulate. Patient was also noted to repeat himself and when asked when his symptoms started he stated they began in July. EMS further noted unsteady gait, and that the patient complained of bilateral forearm and lower extremity pain with edema.  Patient further denies cough, shortness of breath, chest pain, fever, chills, abdominal pain, nausea, vomiting, diarrhea, dysuria, headaches, congestion, sore throat, eye pain, ear pain, rash, or other associated symptoms. Patient denies recreational drug use;patient has elevated lactic acid 10.3,improved with bolus of IVF,he has hypokalemia, 2.9,replaced,CT head show no acute process,patient has lege edema,US legs show no DVT.he has proteinuria on UA.patient is admitted for inpatient status.      Overview/Hospital Course:  Mr Sony Smith was admitted with acute encephalopathy and lower extremity edema. Suspect associated with alcohol use. Started IV thiamine (Wernicke treatment protocol), folate, multivitamin, valium. Regarding lower extremity  edema, suspect associated with alcoholic hepatitis. Abdominal US no cirrhosis or ascites. TTE no CHF. Given lasix with improvement. PT, OT consulted. Mental status improved but then worsened on 8/16 with worsening withdrawal symptoms. Increased valium dosing to 20mg Q6h. Withdrawal symptoms improved. Weaning valium slowly. Confusion is improving.       Interval History:  no acute issues, still intermittently confused but not agitated, combative and no hallucinations. Not tachycardic or hypertensive.    Review of Systems  Objective:     Vital Signs (Most Recent):  Temp: 98.8 °F (37.1 °C) (08/25/23 1113)  Pulse: 100 (08/25/23 1113)  Resp: 18 (08/25/23 1113)  BP: 113/73 (08/25/23 1113)  SpO2: 100 % (08/25/23 1113) Vital Signs (24h Range):  Temp:  [97.8 °F (36.6 °C)-98.8 °F (37.1 °C)] 98.8 °F (37.1 °C)  Pulse:  [] 100  Resp:  [15-18] 18  SpO2:  [96 %-100 %] 100 %  BP: (113-132)/(71-76) 113/73     Weight: 67.1 kg (148 lb)  Body mass index is 21.86 kg/m².    Intake/Output Summary (Last 24 hours) at 8/25/2023 1407  Last data filed at 8/25/2023 0901  Gross per 24 hour   Intake 290 ml   Output 400 ml   Net -110 ml           Physical Exam  Vitals and nursing note reviewed.   Constitutional:       General: He is not in acute distress.     Appearance: He is ill-appearing. He is not toxic-appearing.   HENT:      Head: Normocephalic and atraumatic.   Cardiovascular:      Rate and Rhythm: Normal rate and regular rhythm.   Pulmonary:      Effort: Pulmonary effort is normal.      Breath sounds: Normal breath sounds.      Comments: Room air  Musculoskeletal:      Right lower leg: No edema.      Left lower leg: No edema.      Comments: Generalized weakness   Skin:     General: Skin is warm and dry.   Neurological:      Comments: Alert and oriented to self, location and situation but disoriented to year             Significant Labs: All pertinent labs within the past 24 hours have been reviewed.    Significant Imaging: I have  reviewed all pertinent imaging results/findings within the past 24 hours.      Assessment/Plan:      * Alcohol withdrawal syndrome, with delirium  Suspect associated with alcohol withdrawal. CTH no acute changes. NH3 normal and abdominal US no cirrhosis.   - started IV thiamine (Wernicke's protocol), folate, multivitamin. IV thiamine switched to PO   - started valium  - worsening mental status on 8/16 consistent with worsening withdrawals- increased valium  - withdrawals improved  - Valium taper   - continue to monitor with CIWA q4    Debility  Very weak when working with PT, OT  - continue PT, OT  - planning IPR for dispo      Folate deficiency  Supplement       Thrombocytopenia  Plts 113 on admit, no signs of bleeding. Most likely associated with alcohol abuse      Macrocytic anemia  Associated with alcohol abuse and folate deficiency  - supplement folate       Alcoholic hepatitis without ascites  No signs of cirrhosis. Not consistent with acute liver failure. Treating alcohol withdrawal. AST/ALT elevations due to alcoholic hepatitis.       Alcohol abuse  See above      Lower extremity edema  Suspect associated with alcoholic hepatitis. US Abdomen no cirrhosis. UA only 1+ protein, doubt nephrotic syndrome. TTE no CHF.   -resolved with lasix      VTE Risk Mitigation (From admission, onward)         Ordered     heparin (porcine) injection 5,000 Units  Every 8 hours         08/14/23 1449                Discharge Planning   ROMAIN: 8/28/2023     Code Status: Full Code   Is the patient medically ready for discharge?:     Reason for patient still in hospital (select all that apply): Treatment  Discharge Plan A: Rehab   Discharge Delays: (!) Post-Acute Set-up              Nathen Lopes MD  Department of Hospital Medicine   [facility]

## 2023-08-25 NOTE — NURSING
[facility] [place]  Nurses Note -- 4 Eyes      8/25/2023       Skin assessed on: Q Shift      [x] No Pressure Injuries Present    [x]Prevention Measures Documented    [] Yes LDA  for Pressure Injury Previously documented     [] Yes New Pressure Injury Discovered   [] LDA for New Pressure Injury Added      Attending RN:  Gerda Wolff RN     Second RN:  Chandler Arnold LPN

## 2023-08-25 NOTE — PLAN OF CARE
Case Management Re-Assessment      PCP: [facility]     Pharmacy: [facility]     Patient Arrived From: home  Existing Help at Home: Friend, Carmine Jacobs [phone]     Barriers to Discharge: Requires medical care.     Discharge Plan:               [facility]    Spoke with Keke with [facility], [phone] stated the office  personnel has reached out to [facility] and she will follow up on status of insurance auth.     12:22pm Placed call to Keke with [facility], voice mailbox full.     3:36pm Placed call to Keke with [facility], left detailed voice message. [place] to continue to follow up.    08/25/23 0819   Discharge Reassessment   Assessment Type Discharge Planning Reassessment   Did the patient's condition or plan change since previous assessment? No   Transition of Care Barriers None   Why the patient remains in the hospital Requires continued medical care   Post-Acute Status   Post-Acute Authorization Placement   Post-Acute Placement Status Pending payor review/awaiting authorization (if required)   Discharge Delays (!) Post-Acute Set-up

## 2023-08-25 NOTE — PLAN OF CARE
Problem: Occupational Therapy  Goal: Occupational Therapy Goal  Description: Goals to be met by: 8/29/23      Patient will increase functional independence with ADLs by performing:    UE Dressing with Modified [place].  LE Dressing with Stand-by Assistance.  Grooming while seated with Supervision.  [place] [facility] [place] with supervision for hygiene and clothing management.   Toilet transfer to BS with Minimal Assistance.  Supine<>sit with supervision  Step transfer with Minimal Assistance  Upper extremity exercise program x15 reps per handout, with independence   Manual w/c propulsion using BUE/BLE ~50 ft with modified independence     Outcome: Ongoing, Progressing

## 2023-08-26 PROCEDURE — 25000003 PHARM REV CODE 250: Performed by: HOSPITALIST

## 2023-08-26 PROCEDURE — S4991 NICOTINE PATCH NONLEGEND: HCPCS | Performed by: HOSPITALIST

## 2023-08-26 PROCEDURE — 63600175 PHARM REV CODE 636 W HCPCS: Performed by: HOSPITALIST

## 2023-08-26 PROCEDURE — 25000003 PHARM REV CODE 250: Performed by: STUDENT IN AN ORGANIZED HEALTH CARE EDUCATION/TRAINING PROGRAM

## 2023-08-26 PROCEDURE — 11000001 HC ACUTE MED/SURG PRIVATE ROOM

## 2023-08-26 RX ORDER — DIAZEPAM 5 MG/1
10 TABLET ORAL EVERY 12 HOURS
Status: DISCONTINUED | OUTPATIENT
Start: 2023-08-26 | End: 2023-08-28 | Stop reason: HOSPADM

## 2023-08-26 RX ADMIN — Medication 1 PATCH: at 08:08

## 2023-08-26 RX ADMIN — HEPARIN SODIUM 5000 UNITS: 5000 INJECTION INTRAVENOUS; SUBCUTANEOUS at 10:08

## 2023-08-26 RX ADMIN — HEPARIN SODIUM 5000 UNITS: 5000 INJECTION INTRAVENOUS; SUBCUTANEOUS at 07:08

## 2023-08-26 RX ADMIN — HEPARIN SODIUM 5000 UNITS: 5000 INJECTION INTRAVENOUS; SUBCUTANEOUS at 02:08

## 2023-08-26 RX ADMIN — DIAZEPAM 10 MG: 5 TABLET ORAL at 08:08

## 2023-08-26 RX ADMIN — FOLIC ACID 1 MG: 1 TABLET ORAL at 08:08

## 2023-08-26 RX ADMIN — DIAZEPAM 10 MG: 5 TABLET ORAL at 07:08

## 2023-08-26 RX ADMIN — THIAMINE HCL TAB 100 MG 100 MG: 100 TAB at 08:08

## 2023-08-26 RX ADMIN — THERA TABS 1 TABLET: TAB at 08:08

## 2023-08-26 NOTE — PROGRESS NOTES
[facility]  Progress Note    Patient Name: Sony Smith  MRN: [id number]  Patient Class: IP- Inpatient   Admission Date: 8/14/2023  Length of Stay: 12 days  Attending Physician: Nathen Lopes MD  Primary Care Provider: Meghana, Primary Doctor        Subjective:     Principal Problem:Alcohol withdrawal syndrome, with delirium        HPI:  This is a 37 year old male with PMHx of alcohol abuse,who presents to the ED via EMS for chief complaint of Altered Mental Status onset this morning. Patient reports travelling for work and that he has been under stress and his symptoms began in [facility]. Additional history obtained by an independent historian: EMS personnel, notes that when EMS arrived patient was sitting on a lawn chair on the porch incoherent; patient's roommate stated that the patient is a recovering alcoholic and had 1 alcoholic beverage this morning as well as usually being coherent and able to ambulate. Patient was also noted to repeat himself and when asked when his symptoms started he stated they began in July. EMS further noted unsteady gait, and that the patient complained of bilateral forearm and lower extremity pain with edema.  Patient further denies cough, shortness of breath, chest pain, fever, chills, abdominal pain, nausea, vomiting, diarrhea, dysuria, headaches, congestion, sore throat, eye pain, ear pain, rash, or other associated symptoms. Patient denies recreational drug use;patient has elevated lactic acid 10.3,improved with bolus of IVF,he has hypokalemia, 2.9,replaced,CT head show no acute process,patient has lege edema,US legs show no DVT.he has proteinuria on UA.patient is admitted for inpatient status.      Overview/Hospital Course:  Mr Sony Smith was admitted with acute encephalopathy and lower extremity edema. Suspect associated with alcohol use. Started IV thiamine (Wernicke treatment protocol), folate, multivitamin, valium. Regarding lower extremity  edema, suspect associated with alcoholic hepatitis. Abdominal US no cirrhosis or ascites. TTE no CHF. Given lasix with improvement. PT, OT consulted. Mental status improved but then worsened on 8/16 with worsening withdrawal symptoms. Increased valium dosing to 20mg Q6h. Withdrawal symptoms improved. Weaning valium slowly. Confusion is improving.       Interval History:  no acute issues overnight, he is still pleasantly disoriented to the date. Updated sister  Florencia    Review of Systems  Objective:     Vital Signs (Most Recent):  Temp: 98.2 °F (36.8 °C) (08/26/23 1235)  Pulse: 95 (08/26/23 1235)  Resp: 20 (08/26/23 1235)  BP: 129/77 (08/26/23 1235)  SpO2: 100 % (08/26/23 1235) Vital Signs (24h Range):  Temp:  [97.3 °F (36.3 °C)-99.2 °F (37.3 °C)] 98.2 °F (36.8 °C)  Pulse:  [] 95  Resp:  [18-20] 20  SpO2:  [97 %-100 %] 100 %  BP: (116-131)/(70-77) 129/77     Weight: 67.1 kg (148 lb)  Body mass index is 21.86 kg/m².    Intake/Output Summary (Last 24 hours) at 8/26/2023 1403  Last data filed at 8/26/2023 0940  Gross per 24 hour   Intake 120 ml   Output 850 ml   Net -730 ml           Physical Exam  Vitals and nursing note reviewed.   Constitutional:       General: He is not in acute distress.     Appearance: He is ill-appearing. He is not toxic-appearing.   HENT:      Head: Normocephalic and atraumatic.   Cardiovascular:      Rate and Rhythm: Normal rate and regular rhythm.   Pulmonary:      Effort: Pulmonary effort is normal.      Breath sounds: Normal breath sounds.      Comments: Room air  Musculoskeletal:      Right lower leg: No edema.      Left lower leg: No edema.      Comments: Generalized weakness   Skin:     General: Skin is warm and dry.   Neurological:      Comments: Alert and oriented to self, location and situation but disoriented to year             Significant Labs: All pertinent labs within the past 24 hours have been reviewed.    Significant Imaging: I have reviewed all pertinent imaging  results/findings within the past 24 hours.      Assessment/Plan:      * Alcohol withdrawal syndrome, with delirium  Suspect associated with alcohol withdrawal. CTH no acute changes. NH3 normal and abdominal US no cirrhosis.   - started IV thiamine (Wernicke's protocol), folate, multivitamin. IV thiamine switched to PO   - started valium  - worsening mental status on 8/16 consistent with worsening withdrawals- increased valium  - withdrawals improved  - Valium taper   - continue to monitor with CIWA q4    Debility  Very weak when working with PT, OT  - continue PT, OT  - planning IPR for dispo      Folate deficiency  Supplement       Thrombocytopenia  Plts 113 on admit, no signs of bleeding. Most likely associated with alcohol abuse      Macrocytic anemia  Associated with alcohol abuse and folate deficiency  - supplement folate       Alcoholic hepatitis without ascites  No signs of cirrhosis. Not consistent with acute liver failure. Treating alcohol withdrawal. AST/ALT elevations due to alcoholic hepatitis.       Alcohol abuse  See above      Lower extremity edema  Suspect associated with alcoholic hepatitis. US Abdomen no cirrhosis. UA only 1+ protein, doubt nephrotic syndrome. TTE no CHF.   -resolved with lasix      VTE Risk Mitigation (From admission, onward)         Ordered     heparin (porcine) injection 5,000 Units  Every 8 hours         08/14/23 1449                Discharge Planning   ROMAIN: 8/28/2023     Code Status: Full Code   Is the patient medically ready for discharge?:     Reason for patient still in hospital (select all that apply): Treatment  Discharge Plan A: Rehab   Discharge Delays: (!) Post-Acute Set-up              Nathen Lopes MD  Department of Hospital Medicine   [facility]

## 2023-08-26 NOTE — NURSING
[facility] [place]  Nurses Note -- 4 Eyes      8-25-23        Skin assessed on: Q Shift      [x] No Pressure Injuries Present    [x]Prevention Measures Documented    [] Yes LDA  for Pressure Injury Previously documented     [] Yes New Pressure Injury Discovered   [] LDA for New Pressure Injury Added      Attending RN:  Cherri Foster, RN     Second RN:  Cat Castillo, PCA

## 2023-08-26 NOTE — NURSING
Pt found with legs over side rail trying to get out of bed stating he needs to have a bowel movement. Assist x2 to bedside commode. Pt had bowel movement, cleaned up and assisted back to bed.

## 2023-08-26 NOTE — NURSING
Nurses Note -- 4 Eyes      8/26/2023   12:18 PM      Skin assessed during: Q Shift Change      [x] No Altered Skin Integrity Present    [x]Prevention Measures Documented      [] Yes- Altered Skin Integrity Present or Discovered   [] LDA Added if Not in Epic (Describe Wound)   [] New Altered Skin Integrity was Present on Admit and Documented in LDA   [] Wound Image Taken    Wound Care Consulted? No    Attending Nurse:  YG Nunez    Second RN/Staff Member:   YG Aleman

## 2023-08-26 NOTE — PLAN OF CARE
Problem: Adult Inpatient Plan of Care  Goal: Plan of Care Review  Outcome: Ongoing, Progressing  Goal: Patient-Specific Goal (Individualized)  Outcome: Ongoing, Progressing  Goal: Optimal Comfort and Wellbeing  Outcome: Ongoing, Progressing  Goal: Readiness for Transition of Care  Outcome: Ongoing, Progressing     Problem: Fall Injury Risk  Goal: Absence of Fall and Fall-Related Injury  Outcome: Ongoing, Progressing     Problem: Alcohol Withdrawal  Goal: Alcohol Withdrawal Symptom Control  Outcome: Ongoing, Progressing     Problem: Attention and Thought Clarity Impairment (Delirium)  Goal: Improved Attention and Thought Clarity  Outcome: Ongoing, Progressing

## 2023-08-26 NOTE — SUBJECTIVE & OBJECTIVE
Interval History:  no acute issues overnight, he is still pleasantly disoriented to the date. Updated sister  Florencia    Review of Systems  Objective:     Vital Signs (Most Recent):  Temp: 98.2 °F (36.8 °C) (08/26/23 1235)  Pulse: 95 (08/26/23 1235)  Resp: 20 (08/26/23 1235)  BP: 129/77 (08/26/23 1235)  SpO2: 100 % (08/26/23 1235) Vital Signs (24h Range):  Temp:  [97.3 °F (36.3 °C)-99.2 °F (37.3 °C)] 98.2 °F (36.8 °C)  Pulse:  [] 95  Resp:  [18-20] 20  SpO2:  [97 %-100 %] 100 %  BP: (116-131)/(70-77) 129/77     Weight: 67.1 kg (148 lb)  Body mass index is 21.86 kg/m².    Intake/Output Summary (Last 24 hours) at 8/26/2023 1403  Last data filed at 8/26/2023 0940  Gross per 24 hour   Intake 120 ml   Output 850 ml   Net -730 ml           Physical Exam  Vitals and nursing note reviewed.   Constitutional:       General: He is not in acute distress.     Appearance: He is ill-appearing. He is not toxic-appearing.   HENT:      Head: Normocephalic and atraumatic.   Cardiovascular:      Rate and Rhythm: Normal rate and regular rhythm.   Pulmonary:      Effort: Pulmonary effort is normal.      Breath sounds: Normal breath sounds.      Comments: Room air  Musculoskeletal:      Right lower leg: No edema.      Left lower leg: No edema.      Comments: Generalized weakness   Skin:     General: Skin is warm and dry.   Neurological:      Comments: Alert and oriented to self, location and situation but disoriented to year             Significant Labs: All pertinent labs within the past 24 hours have been reviewed.    Significant Imaging: I have reviewed all pertinent imaging results/findings within the past 24 hours.

## 2023-08-27 PROCEDURE — S4991 NICOTINE PATCH NONLEGEND: HCPCS | Performed by: HOSPITALIST

## 2023-08-27 PROCEDURE — 11000001 HC ACUTE MED/SURG PRIVATE ROOM

## 2023-08-27 PROCEDURE — 25000003 PHARM REV CODE 250: Performed by: HOSPITALIST

## 2023-08-27 PROCEDURE — 97530 THERAPEUTIC ACTIVITIES: CPT | Mod: CQ

## 2023-08-27 PROCEDURE — 25000003 PHARM REV CODE 250: Performed by: STUDENT IN AN ORGANIZED HEALTH CARE EDUCATION/TRAINING PROGRAM

## 2023-08-27 PROCEDURE — 97110 THERAPEUTIC EXERCISES: CPT | Mod: CQ

## 2023-08-27 PROCEDURE — 63600175 PHARM REV CODE 636 W HCPCS: Performed by: HOSPITALIST

## 2023-08-27 RX ADMIN — HEPARIN SODIUM 5000 UNITS: 5000 INJECTION INTRAVENOUS; SUBCUTANEOUS at 07:08

## 2023-08-27 RX ADMIN — HEPARIN SODIUM 5000 UNITS: 5000 INJECTION INTRAVENOUS; SUBCUTANEOUS at 09:08

## 2023-08-27 RX ADMIN — DIAZEPAM 10 MG: 5 TABLET ORAL at 09:08

## 2023-08-27 RX ADMIN — FOLIC ACID 1 MG: 1 TABLET ORAL at 09:08

## 2023-08-27 RX ADMIN — Medication 1 PATCH: at 09:08

## 2023-08-27 RX ADMIN — THIAMINE HCL TAB 100 MG 100 MG: 100 TAB at 09:08

## 2023-08-27 RX ADMIN — THERA TABS 1 TABLET: TAB at 09:08

## 2023-08-27 RX ADMIN — HEPARIN SODIUM 5000 UNITS: 5000 INJECTION INTRAVENOUS; SUBCUTANEOUS at 02:08

## 2023-08-27 NOTE — PT/OT/SLP PROGRESS
Physical Therapy Treatment    Patient Name:  Sony Smith   MRN:  [id number]    Recommendations:     Discharge Recommendations: rehabilitation facility  Discharge Equipment Recommendations:  (TBD)  Barriers to discharge:  Prior to admission patient was independent with mobility and self-care and there is expectation of returning to prior level of function to maintain independence avoiding readmission. Pt is at high risk of unplanned readmission due to fall risk and not being at prior level of function. The lower level of care cannot provide total interdisciplinary approach needed. Pt is able to tolerate 3 hours of daily therapy. Pt is pleasant and motivated to return to prior level of function.  Pt with multiple stairs at home.    Assessment:     Sony Smith is a 37 y.o. male admitted with a medical diagnosis of Alcohol withdrawal syndrome, with delirium.  He presents with the following impairments/functional limitations: weakness, impaired endurance, impaired self care skills, impaired functional mobility, gait instability, impaired balance, impaired cognition, decreased coordination, decreased upper extremity function, decreased lower extremity function, decreased safety awareness, impaired coordination.  Pt very weak and pleasantly confused.  Pt able to talk about his daughter and tell me he is from [facility], but couldn't stat where he lives presently. Pt was able to follow demo/vcs to participate with exercises. Pt had difficulty initiating step with RLE.  Pt with decreased step length and decreased foot clearance with bed to chair transfer.  Pt tolerated sitting OOB in chair today x 2 hours with chair alarm.    Rehab Prognosis: Good; patient would benefit from acute skilled PT services to address these deficits and reach maximum level of function.    Recent Surgery: * No surgery found *      Plan:     During this hospitalization, patient to be seen 5 x/week to address the identified rehab impairments via  "gait training, therapeutic activities, therapeutic exercises and progress toward the following goals:    Plan of Care Expires:  08/29/23    Subjective     Chief Complaint: none  Patient/Family Comments/goals: "I have to go to work just like everybody else."  Pain/Comfort:  Pain Rating 1: 0/10  Pain Rating Post-Intervention 1: 0/10      Objective:     Communicated with nurse Love prior to session.  Patient found HOB elevated with peripheral IV, Condom Catheter, bed alarm (avasys) upon PT entry to room.     General Precautions: Standard, fall  Orthopedic Precautions: N/A  Braces: N/A  Respiratory Status: Room air     Functional Mobility:  Bed Mobility:     Rolling Right: minimum assistance  Scooting: contact guard assistance  Supine to Sit: minimum assistance  Sit to Supine: minimum assistance  Transfers:     Sit to Stand:  minimum assistance with rolling walker  Bed to Chair: minimum assistance with  rolling walker  using  Stand Pivot  Gait: 4 steps with rw bed to chair with min A, min A with rw management  Balance: F standing, posterior lean with sitting  F- sitting      AM-PAC 6 CLICK MOBILITY  Turning over in bed (including adjusting bedclothes, sheets and blankets)?: 3  Sitting down on and standing up from a chair with arms (e.g., wheelchair, bedside commode, etc.): 3  Moving from lying on back to sitting on the side of the bed?: 3  Moving to and from a bed to a chair (including a wheelchair)?: 3  Need to walk in hospital room?: 2  Climbing 3-5 steps with a railing?: 1  Basic Mobility Total Score: 15       Treatment & Education:  Lower Extremity Exercises.    Patient educated on: Purpose and Benefits of Therapeutic Exercise(s).    Patient verbalized acceptance/understanding of instruction(s), expectation(s), and limitation(s) (for safety).  Patient performed: 2 sets of 10 reps (each) of B LE Ther Ex: AP, LAQ, Hip abd/add, Hip flexion while sitting up on EOB.       Patient required still requires verbal " cues/tactile cues to ensure correct sequence, to maintain proper form, and to allow for self-correction.  Pt reported no complaints or problems with exercise activity.     Patient required increase Reaction Time to initiate movements and a Sitting Rest Break between set(s) to recover.       Patient left up in chair with all lines intact, call button in reach, chair alarm on, and nurse notified..    GOALS:   Multidisciplinary Problems       Physical Therapy Goals          Problem: Physical Therapy    Goal Priority Disciplines Outcome Goal Variances Interventions   Physical Therapy Goal     PT, PT/OT Ongoing, Progressing     Description: Goals to be met by: 23     Patient will increase functional independence with mobility by performin. Supine to sit with Modified [place]  2. Rolling to Left and Right with Modified [place]  3. Sit to stand transfer with Modified [place] using RW  4. Bed to chair transfer with Modified [place]   5. Gait x50 feet with Modified [place] using Rolling Walker   6. Wheelchair propulsion x200 feet with Modified [place] using bilateral upper extremities  7. Lower extremity exercise program 2 sets x15 reps per handout, with independence                         Time Tracking:     PT Received On: 23  PT Start Time: 1130   1300  PT Stop Time: 1200    1308  PT Total Time (min): 38 min     Billable Minutes: Therapeutic Activity 15 and Therapeutic Exercise 15  Returned to transfer back to bed 8 min  Treatment Type: Treatment  PT/PTA: PTA     Number of PTA visits since last PT visit: 3     2023

## 2023-08-27 NOTE — PLAN OF CARE
Problem: Adult Inpatient Plan of Care  Goal: Plan of Care Review  8/27/2023 1026 by Mayra Kessler RN  Outcome: Ongoing, Progressing  8/27/2023 1025 by Mayra Kessler, RN  Outcome: Ongoing, Progressing  Goal: Patient-Specific Goal (Individualized)  8/27/2023 1026 by Mayra Kessler RN  Outcome: Ongoing, Progressing  8/27/2023 1025 by Mayra Kessler RN  Outcome: Ongoing, Progressing  Goal: Absence of Hospital-Acquired Illness or Injury  8/27/2023 1026 by Mayra Kessler RN  Outcome: Ongoing, Progressing  8/27/2023 1025 by Mayra Kessler RN  Outcome: Ongoing, Progressing  Goal: Optimal Comfort and Wellbeing  8/27/2023 1026 by Mayra Kessler, RN  Outcome: Ongoing, Progressing  8/27/2023 1025 by Mayra Kessler RN  Outcome: Ongoing, Progressing  Goal: Readiness for Transition of Care  8/27/2023 1026 by Mayra Kessler, RN  Outcome: Ongoing, Progressing  8/27/2023 1025 by Mayra Kessler RN  Outcome: Ongoing, Progressing     Problem: Fall Injury Risk  Goal: Absence of Fall and Fall-Related Injury  8/27/2023 1026 by Mayra Kessler, RN  Outcome: Ongoing, Progressing  8/27/2023 1025 by Mayra Kessler RN  Outcome: Ongoing, Progressing  Intervention: Promote Injury-Free Environment  Flowsheets (Taken 8/27/2023 1026)  Safety Promotion/Fall Prevention:   bed alarm set   side rails raised x 3   instructed to call staff for mobility   room near unit station   /camera at bedside   nonskid shoes/socks when out of bed     Problem: Alcohol Withdrawal  Goal: Alcohol Withdrawal Symptom Control  8/27/2023 1026 by Mayra Kessler RN  Outcome: Ongoing, Progressing  8/27/2023 1025 by Mayra Kessler, RN  Outcome: Ongoing, Progressing     Problem: Skin Injury Risk Increased  Goal: Skin Health and Integrity  8/27/2023 1026 by Mayra Kessler RN  Outcome: Ongoing, Progressing  8/27/2023 1025 by [place], Mayra M., RN  Outcome: Ongoing, Progressing     Problem: Adjustment to Illness (Delirium)  Goal: Optimal  Coping  8/27/2023 1026 by Mayra Kessler, RN  Outcome: Ongoing, Progressing  8/27/2023 1025 by Mayra Kessler RN  Outcome: Ongoing, Progressing     Problem: Altered Behavior (Delirium)  Goal: Improved Behavioral Control  8/27/2023 1026 by Mayra Kessler, RN  Outcome: Ongoing, Progressing  8/27/2023 1025 by Mayra Kessler, RN  Outcome: Ongoing, Progressing     Problem: Attention and Thought Clarity Impairment (Delirium)  Goal: Improved Attention and Thought Clarity  8/27/2023 1026 by Mayra Kessler, RN  Outcome: Ongoing, Progressing  8/27/2023 1025 by Mayra Kessler, RN  Outcome: Ongoing, Progressing  Pt frequently reoriented, to place and situation     Problem: Sleep Disturbance (Delirium)  Goal: Improved Sleep  8/27/2023 1026 by Mayra Kessler, RN  Outcome: Ongoing, Progressing  8/27/2023 1025 by Mayra Kessler, RN  Outcome: Ongoing, Progressing

## 2023-08-27 NOTE — PLAN OF CARE
Problem: Physical Therapy  Goal: Physical Therapy Goal  Description: Goals to be met by: 23     Patient will increase functional independence with mobility by performin. Supine to sit with Modified [place]  2. Rolling to Left and Right with Modified [place]  3. Sit to stand transfer with Modified [place] using RW  4. Bed to chair transfer with Modified [place]   5. Gait x50 feet with Modified [place] using Rolling Walker   6. Wheelchair propulsion x200 feet with Modified [place] using bilateral upper extremities  7. Lower extremity exercise program 2 sets x15 reps per handout, with independence    Outcome: Ongoing, Progressing

## 2023-08-27 NOTE — SUBJECTIVE & OBJECTIVE
Interval History:  Working with therapy but still diffusely weak.    Review of Systems  Objective:     Vital Signs (Most Recent):  Temp: 98.2 °F (36.8 °C) (08/27/23 1246)  Pulse: (!) 114 (08/27/23 1246)  Resp: 20 (08/27/23 1246)  BP: 127/80 (08/27/23 1246)  SpO2: 100 % (08/27/23 1246) Vital Signs (24h Range):  Temp:  [97.6 °F (36.4 °C)-98.9 °F (37.2 °C)] 98.2 °F (36.8 °C)  Pulse:  [] 114  Resp:  [16-20] 20  SpO2:  [97 %-100 %] 100 %  BP: (112-127)/(73-82) 127/80     Weight: 67.1 kg (148 lb)  Body mass index is 21.86 kg/m².    Intake/Output Summary (Last 24 hours) at 8/27/2023 1312  Last data filed at 8/27/2023 0839  Gross per 24 hour   Intake 720 ml   Output --   Net 720 ml           Physical Exam  Vitals and nursing note reviewed.   Constitutional:       General: He is not in acute distress.     Appearance: He is ill-appearing. He is not toxic-appearing.   HENT:      Head: Normocephalic and atraumatic.   Cardiovascular:      Rate and Rhythm: Normal rate and regular rhythm.   Pulmonary:      Effort: Pulmonary effort is normal.      Breath sounds: Normal breath sounds.      Comments: Room air  Musculoskeletal:      Right lower leg: No edema.      Left lower leg: No edema.      Comments: Generalized weakness   Skin:     General: Skin is warm and dry.   Neurological:      Comments: Alert and oriented to self, location and situation but disoriented to year             Significant Labs: All pertinent labs within the past 24 hours have been reviewed.    Significant Imaging: I have reviewed all pertinent imaging results/findings within the past 24 hours.

## 2023-08-27 NOTE — NURSING
Nurses Note -- 4 Eyes      8/27/2023   10:39 AM      Skin assessed during: Q Shift Change      [x] No Altered Skin Integrity Present    []Prevention Measures Documented      [] Yes- Altered Skin Integrity Present or Discovered   [] LDA Added if Not in Epic (Describe Wound)   [] New Altered Skin Integrity was Present on Admit and Documented in LDA   [] Wound Image Taken    Wound Care Consulted? No    Attending Nurse:  YG Nunez    Second RN/Staff Member:  YG Aleman

## 2023-08-27 NOTE — PLAN OF CARE
Problem: Adult Inpatient Plan of Care  Goal: Plan of Care Review  Outcome: Ongoing, Progressing  Goal: Patient-Specific Goal (Individualized)  Outcome: Ongoing, Progressing  Goal: Readiness for Transition of Care  Outcome: Ongoing, Progressing     Problem: Fall Injury Risk  Goal: Absence of Fall and Fall-Related Injury  Outcome: Ongoing, Progressing     Problem: Skin Injury Risk Increased  Goal: Skin Health and Integrity  Outcome: Ongoing, Progressing     Problem: Adjustment to Illness (Delirium)  Goal: Optimal Coping  Outcome: Ongoing, Progressing     Problem: Sleep Disturbance (Delirium)  Goal: Improved Sleep  Outcome: Ongoing, Progressing

## 2023-08-27 NOTE — PROGRESS NOTES
[facility] Medicine  Progress Note    Patient Name: Sony Smith  MRN: [id number]  Patient Class: IP- Inpatient   Admission Date: 8/14/2023  Length of Stay: 13 days  Attending Physician: Nathen Lopes MD  Primary Care Provider: Meghana, Primary Doctor        Subjective:     Principal Problem:Alcohol withdrawal syndrome, with delirium        HPI:  This is a 37 year old male with PMHx of alcohol abuse,who presents to the ED via EMS for chief complaint of Altered Mental Status onset this morning. Patient reports travelling for work and that he has been under stress and his symptoms began in [facility]. Additional history obtained by an independent historian: EMS personnel, notes that when EMS arrived patient was sitting on a lawn chair on the porch incoherent; patient's roommate stated that the patient is a recovering alcoholic and had 1 alcoholic beverage this morning as well as usually being coherent and able to ambulate. Patient was also noted to repeat himself and when asked when his symptoms started he stated they began in July. EMS further noted unsteady gait, and that the patient complained of bilateral forearm and lower extremity pain with edema.  Patient further denies cough, shortness of breath, chest pain, fever, chills, abdominal pain, nausea, vomiting, diarrhea, dysuria, headaches, congestion, sore throat, eye pain, ear pain, rash, or other associated symptoms. Patient denies recreational drug use;patient has elevated lactic acid 10.3,improved with bolus of IVF,he has hypokalemia, 2.9,replaced,CT head show no acute process,patient has lege edema,US legs show no DVT.he has proteinuria on UA.patient is admitted for inpatient status.      Overview/Hospital Course:  Mr Sony Smith was admitted with acute encephalopathy and lower extremity edema. Suspect associated with alcohol use. Started IV thiamine (Wernicke treatment protocol), folate, multivitamin, valium. Regarding lower extremity  edema, suspect associated with alcoholic hepatitis. Abdominal US no cirrhosis or ascites. TTE no CHF. Given lasix with improvement. PT, OT consulted. Mental status improved but then worsened on 8/16 with worsening withdrawal symptoms. Increased valium dosing to 20mg Q6h. Withdrawal symptoms improved. Weaning valium slowly. Confusion is improving.       Interval History:  Working with therapy but still diffusely weak.    Review of Systems  Objective:     Vital Signs (Most Recent):  Temp: 98.2 °F (36.8 °C) (08/27/23 1246)  Pulse: (!) 114 (08/27/23 1246)  Resp: 20 (08/27/23 1246)  BP: 127/80 (08/27/23 1246)  SpO2: 100 % (08/27/23 1246) Vital Signs (24h Range):  Temp:  [97.6 °F (36.4 °C)-98.9 °F (37.2 °C)] 98.2 °F (36.8 °C)  Pulse:  [] 114  Resp:  [16-20] 20  SpO2:  [97 %-100 %] 100 %  BP: (112-127)/(73-82) 127/80     Weight: 67.1 kg (148 lb)  Body mass index is 21.86 kg/m².    Intake/Output Summary (Last 24 hours) at 8/27/2023 1312  Last data filed at 8/27/2023 0839  Gross per 24 hour   Intake 720 ml   Output --   Net 720 ml           Physical Exam  Vitals and nursing note reviewed.   Constitutional:       General: He is not in acute distress.     Appearance: He is ill-appearing. He is not toxic-appearing.   HENT:      Head: Normocephalic and atraumatic.   Cardiovascular:      Rate and Rhythm: Normal rate and regular rhythm.   Pulmonary:      Effort: Pulmonary effort is normal.      Breath sounds: Normal breath sounds.      Comments: Room air  Musculoskeletal:      Right lower leg: No edema.      Left lower leg: No edema.      Comments: Generalized weakness   Skin:     General: Skin is warm and dry.   Neurological:      Comments: Alert and oriented to self, location and situation but disoriented to year             Significant Labs: All pertinent labs within the past 24 hours have been reviewed.    Significant Imaging: I have reviewed all pertinent imaging results/findings within the past 24  hours.      Assessment/Plan:      * Alcohol withdrawal syndrome, with delirium  Suspect associated with alcohol withdrawal. CTH no acute changes. NH3 normal and abdominal US no cirrhosis.   - started IV thiamine (Wernicke's protocol), folate, multivitamin. IV thiamine switched to PO   - started valium  - worsening mental status on 8/16 consistent with worsening withdrawals- increased valium  - withdrawals improved  - Valium taper   - continue to monitor with CIWA q4    Debility  Very weak when working with PT, OT  - continue PT, OT  - planning IPR for dispo      Folate deficiency  Supplement       Thrombocytopenia  Plts 113 on admit, no signs of bleeding. Most likely associated with alcohol abuse      Macrocytic anemia  Associated with alcohol abuse and folate deficiency  - supplement folate       Alcoholic hepatitis without ascites  No signs of cirrhosis. Not consistent with acute liver failure. Treating alcohol withdrawal. AST/ALT elevations due to alcoholic hepatitis.       Alcohol abuse  See above      Lower extremity edema  Suspect associated with alcoholic hepatitis. US Abdomen no cirrhosis. UA only 1+ protein, doubt nephrotic syndrome. TTE no CHF.   -resolved with lasix      VTE Risk Mitigation (From admission, onward)         Ordered     heparin (porcine) injection 5,000 Units  Every 8 hours         08/14/23 1449                Discharge Planning   ROMAIN: 8/28/2023     Code Status: Full Code   Is the patient medically ready for discharge?:     Reason for patient still in hospital (select all that apply): Treatment and Pending disposition  Discharge Plan A: Rehab   Discharge Delays: (!) Post-Acute Set-up              Nathen Lopes MD  Department of Hospital Medicine   [facility]

## 2023-08-28 VITALS
BODY MASS INDEX: 21.92 KG/M2 | TEMPERATURE: 99 F | HEIGHT: 69 IN | SYSTOLIC BLOOD PRESSURE: 114 MMHG | WEIGHT: 148 LBS | OXYGEN SATURATION: 100 % | HEART RATE: 97 BPM | DIASTOLIC BLOOD PRESSURE: 71 MMHG | RESPIRATION RATE: 15 BRPM

## 2023-08-28 PROCEDURE — S4991 NICOTINE PATCH NONLEGEND: HCPCS | Performed by: HOSPITALIST

## 2023-08-28 PROCEDURE — 25000003 PHARM REV CODE 250: Performed by: HOSPITALIST

## 2023-08-28 PROCEDURE — 97110 THERAPEUTIC EXERCISES: CPT

## 2023-08-28 PROCEDURE — 63600175 PHARM REV CODE 636 W HCPCS: Performed by: HOSPITALIST

## 2023-08-28 PROCEDURE — 25000003 PHARM REV CODE 250: Performed by: STUDENT IN AN ORGANIZED HEALTH CARE EDUCATION/TRAINING PROGRAM

## 2023-08-28 RX ORDER — LANOLIN ALCOHOL/MO/W.PET/CERES
100 CREAM (GRAM) TOPICAL DAILY
Qty: 30 TABLET | Refills: 11
Start: 2023-08-29

## 2023-08-28 RX ADMIN — DIAZEPAM 10 MG: 5 TABLET ORAL at 08:08

## 2023-08-28 RX ADMIN — Medication 1 PATCH: at 08:08

## 2023-08-28 RX ADMIN — FOLIC ACID 1 MG: 1 TABLET ORAL at 08:08

## 2023-08-28 RX ADMIN — HEPARIN SODIUM 5000 UNITS: 5000 INJECTION INTRAVENOUS; SUBCUTANEOUS at 07:08

## 2023-08-28 RX ADMIN — HEPARIN SODIUM 5000 UNITS: 5000 INJECTION INTRAVENOUS; SUBCUTANEOUS at 01:08

## 2023-08-28 RX ADMIN — THERA TABS 1 TABLET: TAB at 08:08

## 2023-08-28 RX ADMIN — THIAMINE HCL TAB 100 MG 100 MG: 100 TAB at 08:08

## 2023-08-28 NOTE — NURSING
Attempted to call report, [facility] states the doctor at their facility has to sign something regarding him coming there.  This nurse was told she would get a call back to call report

## 2023-08-28 NOTE — PLAN OF CARE
Received call from Keke with [facility] inpatient rehab. Ins auth received and plan for patient admit today, physician notified.     Updated progress notes and MAR sent to [facility] via care port.     12:29pm Plan of care orders and dc summary sent to [facility] via care port for review.     12:51pm Spoke with Keke, stated nurse to call report and transportation can be requested. Pt's sister, Florencia, notified of dc plan, agreeable for pt discharge to [facility].

## 2023-08-28 NOTE — CONSULTS
[facility] Hospital Medicine  Telemedicine Consult Note     Pt has been accepted for transfer to [facility] and will be followed through telemedicine services beginning at 7 AM.    Celia Calzada MD  [facility] Medicine Staff

## 2023-08-28 NOTE — DISCHARGE SUMMARY
[facility] Medicine  Discharge Summary      Patient Name: Sony Smith  MRN: [id number]  Patient Class: IP- Inpatient  Admission Date: 8/14/2023  Hospital Length of Stay: 14 days  Discharge Date and Time: No discharge date for patient encounter.  Attending Physician: Celia Calzada MD   Discharging Provider: Celia Calzada MD  Primary Care Provider: Meghana, Primary Doctor      HPI:   This is a 37 year old male with PMHx of alcohol abuse,who presents to the ED via EMS for chief complaint of Altered Mental Status onset this morning. Patient reports travelling for work and that he has been under stress and his symptoms began in [facility]. Additional history obtained by an independent historian: EMS personnel, notes that when EMS arrived patient was sitting on a lawn chair on the porch incoherent; patient's roommate stated that the patient is a recovering alcoholic and had 1 alcoholic beverage this morning as well as usually being coherent and able to ambulate. Patient was also noted to repeat himself and when asked when his symptoms started he stated they began in July. EMS further noted unsteady gait, and that the patient complained of bilateral forearm and lower extremity pain with edema.  Patient further denies cough, shortness of breath, chest pain, fever, chills, abdominal pain, nausea, vomiting, diarrhea, dysuria, headaches, congestion, sore throat, eye pain, ear pain, rash, or other associated symptoms. Patient denies recreational drug use;patient has elevated lactic acid 10.3,improved with bolus of IVF,he has hypokalemia, 2.9,replaced,CT head show no acute process,patient has lege edema,US legs show no DVT.he has proteinuria on UA.patient is admitted for inpatient status.      * No surgery found *      Hospital Course:   Mr Sony Smtih was admitted with acute encephalopathy and lower extremity edema. Suspect associated with alcohol use. Started IV thiamine (Wernicke treatment  protocol), folate, multivitamin, valium. Regarding lower extremity edema, suspect associated with alcoholic hepatitis. Abdominal US no cirrhosis or ascites. TTE no CHF. Given lasix with improvement. PT, OT consulted. Mental status improved but then worsened on 8/16 with worsening withdrawal symptoms. Increased valium dosing to 20mg Q6h. Withdrawal symptoms improved. Weaning valium slowly. Confusion improved but still weak. PT/OT rec rehab placement. Pt was discharged to rehab in stable condition. He will finish his valium taper at rehab.        Goals of Care Treatment Preferences:  Code Status: Full Code      Consults:   Consults (From admission, onward)        Status Ordering Provider     Inpatient virtual consult to Hospital Medicine  Once        Provider:  (Not yet assigned)    Completed CATRACHITO SAUER          No new Assessment & Plan notes have been filed under this hospital service since the last note was generated.  Service: Hospital Medicine    Final Active Diagnoses:    Diagnosis Date Noted POA    PRINCIPAL PROBLEM:  Alcohol withdrawal syndrome, with delirium [F10.931] 08/14/2023 Yes    Debility [R53.81] 08/16/2023 Yes    Macrocytic anemia [D53.9] 08/15/2023 Yes    Thrombocytopenia [D69.6] 08/15/2023 No    Folate deficiency [E53.8] 08/15/2023 Yes    Lower extremity edema [R60.0] 08/14/2023 Yes    Alcohol abuse [F10.10] 08/14/2023 Yes    Alcoholic hepatitis without ascites [K70.10] 08/14/2023 Yes      Problems Resolved During this Admission:    Diagnosis Date Noted Date Resolved POA    Lactic acid acidosis [E87.20] 08/14/2023 08/15/2023 Yes    Hypokalemia [E87.6] 08/14/2023 08/17/2023 Yes       Discharged Condition: stable    Disposition: Rehab Facility    Follow Up:   Follow-up Information     [facility] -. Schedule an appointment as soon as possible for a visit in 1 week(s).    Contact information:  [address] [place] [address] [phone]                       Patient  "Instructions:      Ambulatory referral/consult to Smoking Cessation Program   Standing Status: Future   Referral Priority: Routine Referral Type: Consultation   Referral Reason: Specialty Services Required   Requested Specialty: CTTS   Number of Visits Requested: 1     Diet Cardiac     Notify your health care provider if you experience any of the following:  temperature >100.4     Notify your health care provider if you experience any of the following:  persistent nausea and vomiting or diarrhea     Notify your health care provider if you experience any of the following:  severe uncontrolled pain     Notify your health care provider if you experience any of the following:  redness, tenderness, or signs of infection (pain, swelling, redness, odor or green/yellow discharge around incision site)     Notify your health care provider if you experience any of the following:  difficulty breathing or increased cough     Notify your health care provider if you experience any of the following:  severe persistent headache     Notify your health care provider if you experience any of the following:  worsening rash     Notify your health care provider if you experience any of the following:  persistent dizziness, light-headedness, or visual disturbances     Notify your health care provider if you experience any of the following:  increased confusion or weakness     Send follow up & questions to   Order Comments: Patient's primary care physician: No, Primary Doctor     Activity as tolerated       Significant Diagnostic Studies: Labs: CMP No results for input(s): "NA", "K", "CL", "CO2", "GLU", "BUN", "CREATININE", "CALCIUM", "PROT", "ALBUMIN", "BILITOT", "ALKPHOS", "AST", "ALT", "ANIONGAP", "ESTGFRAFRICA", "EGFRNONAA" in the last 48 hours. and CBC No results for input(s): "WBC", "HGB", "HCT", "PLT" in the last 48 hours.    Pending Diagnostic Studies:     Procedure Component Value Units Date/Time    Urinalysis, Reflex to Urine " Culture Urine, Clean Catch [[id number]] Collected: 08/15/23 0612    Order Status: Sent Lab Status: In process Updated: 08/15/23 0613    Specimen: Urine          Medications:  Reconciled Home Medications:      Medication List      START taking these medications    diazePAM 5 MG tablet  Commonly known as: VALIUM  Take 2 tablets (10 mg total) by mouth every 8 (eight) hours for 3 days, THEN 2 tablets (10 mg total) every 12 (twelve) hours for 2 days, THEN 1 tablet (5 mg total) every 12 (twelve) hours for 2 days, THEN 1 tablet (5 mg total) once daily for 2 days.  Start taking on: August 23, 2023     folic acid 1 MG tablet  Commonly known as: FOLVITE  Take 1 tablet (1 mg total) by mouth once daily.     multivitamin Tab  Take 1 tablet by mouth once daily.     nicotine 21 mg/24 hr  Commonly known as: NICODERM CQ  Place 1 patch onto the skin once daily.     thiamine 100 MG tablet  Take 1 tablet (100 mg total) by mouth once daily.  Start taking on: August 29, 2023        STOP taking these medications    furosemide 40 MG tablet  Commonly known as: LASIX            Indwelling Lines/Drains at time of discharge:   Lines/Drains/Airways     None                 Time spent on the discharge of patient: 39 minutes         The attending portion of this evaluation, treatment, and documentation was performed per Celia Calzada MD via Telemedicine AudioVisual using the secure Vidyo software platform with 2 way audio/video. The provider was located off-site and the patient is located in the hospital. The aforementioned video software was utilized to document the relevant history and physical exam    Celia Calzada MD  Department of Hospital Medicine  [facility]

## 2023-08-28 NOTE — PLAN OF CARE
Problem: Occupational Therapy  Goal: Occupational Therapy Goal  Description: Goals to be met by: 8/29/23      Patient will increase functional independence with ADLs by performing:    UE Dressing with Modified [place].  LE Dressing with Stand-by Assistance.  Grooming while seated with Supervision.  Toileting from [facility] with with supervision for hygiene and clothing management.   Toilet transfer to [facility] with Minimal Assistance.  Supine<>sit with supervision  Step transfer with Minimal Assistance  Upper extremity exercise program x15 reps per handout, with independence   Manual w/c propulsion using BUE/BLE ~50 ft with modified independence     Outcome: Ongoing, Progressing   Patient completed UB therex 15 reps x shoulder, chest, and triceps/biceps with red theraband and 6# weighted bar seated in bed.

## 2023-08-28 NOTE — PT/OT/SLP PROGRESS
Occupational Therapy   Treatment    Name: Sony Smith  MRN: [id number]  Admitting Diagnosis:  Alcohol withdrawal syndrome, with delirium       Recommendations:     Discharge Recommendations: rehabilitation facility  Discharge Equipment Recommendations:  other (see comments) (TBD)  Barriers to discharge:  Other (Comment) (pt. at high risk for falls at home, cannot ambulate and is not at PLOF)    Assessment:     Sony Smith is a 37 y.o. male with a medical diagnosis of Alcohol withdrawal syndrome, with delirium.  He presents with sliding down in bed. Occupational therapy instructed him to scoot to HOB to improve seating position and to prevent bed alarm from going off before sliding OOB. Performance deficits affecting function are weakness, impaired endurance, impaired self care skills, impaired functional mobility, gait instability, impaired balance, impaired cognition, decreased safety awareness, decreased lower extremity function, decreased ROM, impaired coordination.     Rehab Prognosis:  Good; patient would benefit from acute skilled OT services to address these deficits and reach maximum level of function.       Plan:     Patient to be seen 5 x/week to address the above listed problems via self-care/home management, therapeutic activities, therapeutic exercises  Plan of Care Expires: 08/29/23  Plan of Care Reviewed with: patient    Subjective     Chief Complaint: no pain noted today   Patient/Family Comments/goals: He said he is ready for rehab   Pain/Comfort:  Pain Rating 1: 0/10    Objective:     Communicated with: RNLove, prior to session.  Patient found  sliding down in bed, so instructed patient to scoot to HOB  with peripheral IV, Condom Catheter, bed alarm (avasys sitter; chair alarm) upon OT entry to room.    General Precautions: Standard, fall    Orthopedic Precautions:N/A  Braces: N/A  Respiratory Status: Room air     Occupational Performance:     Bed Mobility:    Patient completed  Scooting/Bridging with supervision to scoot to HOB from end of bed due to patient trying to get OOB, so instructed him to use both hand rails and push with his lower extremity to move to HOB.     Functional Mobility/Transfers:  {Transfers:92146}  Functional Mobility: ***    Activities of Daily Living:  {ADL's:60664}      [facility] 6 Click ADL:      Treatment & Education:  Occupational therapy     Patient left {IP OT PATIENT LEFT POSITION OHS:[id number]} with {Patient left with:66757}    GOALS:   Multidisciplinary Problems       Occupational Therapy Goals          Problem: Occupational Therapy    Goal Priority Disciplines Outcome Interventions   Occupational Therapy Goal     OT, PT/OT Ongoing, Progressing    Description: Goals to be met by: 8/29/23      Patient will increase functional independence with ADLs by performing:    UE Dressing with Modified [place].  LE Dressing with Stand-by Assistance.  Grooming while seated with Supervision.  Toileting from [facility] with with supervision for hygiene and clothing management.   Toilet transfer to [facility] with Minimal Assistance.  Supine<>sit with supervision  Step transfer with Minimal Assistance  Upper extremity exercise program x15 reps per handout, with independence   Manual w/c propulsion using BUE/BLE ~50 ft with modified independence                          Time Tracking:     OT Date of Treatment: 08/28/23  OT Start Time: 1130  OT Stop Time: 1150  OT Total Time (min): 20 min    Billable Minutes:Therapeutic Exercise 20     OT/JOHANNY: OT     Number of JOHANNY visits since last OT visit: 0    8/28/2023

## 2023-08-28 NOTE — NURSING
[facility]  Nurses Note -- 4 Eyes    8-27-23      Skin assessed on: Q Shift      [x] No Pressure Injuries Present    [x]Prevention Measures Documented    [] Yes LDA  for Pressure Injury Previously documented     [] Yes New Pressure Injury Discovered   [] LDA for New Pressure Injury Added      Attending RN:  Cherri Foster RN     Second RN:  Love Bravo RN

## 2023-08-28 NOTE — PLAN OF CARE
ADT 30 order placed for Van Transportation.  Requested  time:2:30pm   If transportation does not arrive at ETA time nurse will be instructed to follow protocol for transportation below:  How can I get in touch directly with dispatch, if needed?                 Non-emergent dispatch: [phone] opt 6    +++NURSING:  If Van does not arrive at requested time please call the above Non Emergent Dispatcher.  If issue not resolved please escalate to your charge nurse for further instructions.    Transport to [facility] [address] [place] [id number]

## 2023-08-28 NOTE — PLAN OF CARE
Problem: Adult Inpatient Plan of Care  Goal: Plan of Care Review  Outcome: Ongoing, Progressing  Goal: Patient-Specific Goal (Individualized)  Outcome: Ongoing, Progressing  Goal: Optimal Comfort and Wellbeing  Outcome: Ongoing, Progressing  Goal: Readiness for Transition of Care  Outcome: Ongoing, Progressing     Problem: Fall Injury Risk  Goal: Absence of Fall and Fall-Related Injury  Outcome: Ongoing, Progressing     Problem: Alcohol Withdrawal  Goal: Alcohol Withdrawal Symptom Control  Outcome: Ongoing, Progressing     Problem: Adjustment to Illness (Delirium)  Goal: Optimal Coping  Outcome: Ongoing, Progressing

## 2023-08-28 NOTE — PLAN OF CARE
"   [facility]     Department of Hospital Medicine     [address] [place] [address]     [phone] after hours  [phone] fax       REHAB ORDERS    08/28/2023    Admit to REHAB                                                 Diagnoses:  Active Hospital Problems    Diagnosis  POA    *Alcohol withdrawal syndrome, with delirium [F10.931]  Yes    Debility [R53.81]  Yes    Macrocytic anemia [D53.9]  Yes    Thrombocytopenia [D69.6]  No    Folate deficiency [E53.8]  Yes    Lower extremity edema [R60.0]  Yes    Alcohol abuse [F10.10]  Yes    Alcoholic hepatitis without ascites [K70.10]  Yes      Resolved Hospital Problems    Diagnosis Date Resolved POA    Lactic acid acidosis [E87.20] 08/15/2023 Yes    Hypokalemia [E87.6] 08/17/2023 Yes       Allergies:  Review of patient's allergies indicates:   Allergen Reactions    Nsaids (non-steroidal anti-inflammatory drug) Swelling     " my face swells"    Cyclobenzaprine        Vitals:  Every shift (Skilled Nursing patients)    Diet: regular    Supplement:  1 can every three times a day with meals                         Type:  House          Acitivities: Per PT     LABS:  Per facility protocol    Nursing Precautions:      - Fall precautions per nursing home protocol   - Decubitus precautions:        -  for positioning   - Pressure reducing foam mattress   - Turn patient every two hours. Use wedge pillows to anchor patient   - Aspiration precautions        CONSULTS:      Physical Therapy to evaluate and treat     Occupational Therapy to evaluate and treat     Nutrition to evaluate and recommend diet     Speech Therapy  to evaluate and treat     Psychiatry to evaluate and follow patients for delirium    MISCELLANEOUS CARE:     Routine Skin for Bedridden Patients:  Apply moisture barrier cream to all    skin folds and wet areas in perineal area daily and after baths and                           all bowel " movements.                        Medications: Discontinue all previous medication orders, if any. See new list below.    Current Discharge Medication List        START taking these medications    Details   diazePAM (VALIUM) 5 MG tablet Take 2 tablets (10 mg total) every 12 (twelve) hours for 2 days, THEN 1 tablet (5 mg total) every 12 (twelve) hours for 2 days, THEN 1 tablet (5 mg total) once daily for 2 days. Then STOP. Valium taper.         folic acid (FOLVITE) 1 MG tablet Take 1 tablet (1 mg total) by mouth once daily.  Qty: 30 tablet, Refills: 3      multivitamin Tab Take 1 tablet by mouth once daily.      nicotine (NICODERM CQ) 21 mg/24 hr Place 1 patch onto the skin once daily.  Refills: 0      thiamine 100 MG tablet Take 1 tablet (100 mg total) by mouth once daily.  Qty: 30 tablet, Refills: 11           STOP taking these medications       furosemide (LASIX) 40 MG tablet Comments:   Reason for Stopping:                       _________________________________  Celia Calzada MD  08/28/2023

## 2023-08-28 NOTE — NURSING
Spoke with the DON of [facility].  States a pre screening has not been signed by their MD.  She will call this nurse back once this is done

## 2023-08-28 NOTE — PLAN OF CARE
[facility] - Med Surg  Discharge Final Note    Patient clear to discharge from case management stand point. Pt agreeable for discharge to [facility] for inpatient rehab. Transportation requested.     Primary Care Provider: No, Primary Doctor    Expected Discharge Date: 8/28/2023    Final Discharge Note (most recent)       Final Note - 08/28/23 1307          Final Note    Assessment Type Final Discharge Note (P)      Anticipated Discharge Disposition Rehab Facility (P)      Hospital Resources/Appts/Education Provided Provided patient/caregiver with written discharge plan information (P)         Post-Acute Status    Post-Acute Authorization Placement (P)      Post-Acute Placement Status Set-up Complete/Auth obtained (P)      Discharge Delays None known at this time (P)                      Important Message from Medicare             Contact [facility]    [address] [facility] [place] [address]   Phone: [phone]       Next Steps: Schedule an appointment as soon as possible for a visit in 1 week(s)